# Patient Record
Sex: FEMALE | Race: BLACK OR AFRICAN AMERICAN | NOT HISPANIC OR LATINO | Employment: UNEMPLOYED | URBAN - METROPOLITAN AREA
[De-identification: names, ages, dates, MRNs, and addresses within clinical notes are randomized per-mention and may not be internally consistent; named-entity substitution may affect disease eponyms.]

---

## 2018-03-04 PROBLEM — J30.2 ACUTE SEASONAL ALLERGIC RHINITIS: Status: ACTIVE | Noted: 2018-03-04

## 2018-03-04 PROBLEM — I10 ESSENTIAL HYPERTENSION, BENIGN: Status: ACTIVE | Noted: 2018-03-04

## 2018-04-17 PROBLEM — S93.491A SPRAIN OF POSTERIOR TALOFIBULAR LIGAMENT OF RIGHT ANKLE: Status: ACTIVE | Noted: 2018-04-17

## 2018-05-25 PROBLEM — Z11.3 SCREENING EXAMINATION FOR VENEREAL DISEASE: Status: ACTIVE | Noted: 2018-05-25

## 2018-05-25 PROBLEM — Z00.00 ROUTINE GENERAL MEDICAL EXAMINATION AT A HEALTH CARE FACILITY: Status: ACTIVE | Noted: 2018-05-25

## 2018-05-25 PROBLEM — E55.9 VITAMIN D DEFICIENCY: Status: ACTIVE | Noted: 2018-05-25

## 2018-06-06 PROBLEM — E83.52 HYPERCALCEMIA: Status: ACTIVE | Noted: 2018-06-06

## 2018-06-06 PROBLEM — R73.9 HYPERGLYCEMIA: Status: ACTIVE | Noted: 2018-06-06

## 2018-06-06 PROBLEM — M25.561 ACUTE PAIN OF RIGHT KNEE: Status: ACTIVE | Noted: 2018-06-06

## 2018-08-27 PROBLEM — Z00.00 ROUTINE GENERAL MEDICAL EXAMINATION AT A HEALTH CARE FACILITY: Status: RESOLVED | Noted: 2018-05-25 | Resolved: 2018-08-27

## 2019-06-24 PROBLEM — Z00.00 ANNUAL PHYSICAL EXAM: Status: ACTIVE | Noted: 2018-05-25

## 2019-06-24 PROBLEM — R53.82 CHRONIC FATIGUE: Status: ACTIVE | Noted: 2019-06-24

## 2019-07-08 PROBLEM — M85.89 OSTEOPENIA OF MULTIPLE SITES: Status: ACTIVE | Noted: 2019-07-08

## 2019-09-23 PROBLEM — Z00.00 ANNUAL PHYSICAL EXAM: Status: RESOLVED | Noted: 2018-05-25 | Resolved: 2019-09-23

## 2020-08-21 ENCOUNTER — TELEPHONE (OUTPATIENT)
Dept: ORTHOPEDICS | Facility: CLINIC | Age: 65
End: 2020-08-21

## 2020-08-21 DIAGNOSIS — M25.562 CHRONIC PAIN OF LEFT KNEE: Primary | ICD-10-CM

## 2020-08-21 DIAGNOSIS — G89.29 CHRONIC PAIN OF LEFT KNEE: Primary | ICD-10-CM

## 2020-08-21 NOTE — TELEPHONE ENCOUNTER
Called patient in regards to ortho appt. Informed patient to arrive 30 minutes prior for xrays. Patient verbalized understanding. ms

## 2020-08-27 ENCOUNTER — HOSPITAL ENCOUNTER (OUTPATIENT)
Dept: RADIOLOGY | Facility: HOSPITAL | Age: 65
Discharge: HOME | End: 2020-08-27
Attending: ORTHOPAEDIC SURGERY
Payer: OTHER GOVERNMENT

## 2020-08-27 ENCOUNTER — TELEPHONE (OUTPATIENT)
Dept: ORTHOPEDICS | Facility: CLINIC | Age: 65
End: 2020-08-27

## 2020-08-27 ENCOUNTER — OFFICE VISIT (OUTPATIENT)
Dept: ORTHOPEDICS | Facility: CLINIC | Age: 65
End: 2020-08-27
Payer: OTHER GOVERNMENT

## 2020-08-27 VITALS
HEIGHT: 67 IN | HEART RATE: 97 BPM | BODY MASS INDEX: 42.06 KG/M2 | WEIGHT: 268 LBS | DIASTOLIC BLOOD PRESSURE: 97 MMHG | SYSTOLIC BLOOD PRESSURE: 157 MMHG

## 2020-08-27 DIAGNOSIS — G89.29 CHRONIC PAIN OF LEFT KNEE: ICD-10-CM

## 2020-08-27 DIAGNOSIS — M17.12 ARTHRITIS OF LEFT KNEE: Primary | ICD-10-CM

## 2020-08-27 DIAGNOSIS — M25.562 CHRONIC PAIN OF LEFT KNEE: ICD-10-CM

## 2020-08-27 PROCEDURE — 73562 X-RAY EXAM OF KNEE 3: CPT | Mod: TC,RT,59

## 2020-08-27 PROCEDURE — 73562 XR KNEE ORTHO LEFT WITH FLEXION: ICD-10-PCS | Mod: 26,RT,, | Performed by: RADIOLOGY

## 2020-08-27 PROCEDURE — 73564 X-RAY EXAM KNEE 4 OR MORE: CPT | Mod: 26,LT,, | Performed by: RADIOLOGY

## 2020-08-27 PROCEDURE — 99999 PR PBB SHADOW E&M-EST. PATIENT-LVL III: CPT | Mod: PBBFAC,,, | Performed by: ORTHOPAEDIC SURGERY

## 2020-08-27 PROCEDURE — 99214 PR OFFICE/OUTPT VISIT, EST, LEVL IV, 30-39 MIN: ICD-10-PCS | Mod: S$GLB,,, | Performed by: ORTHOPAEDIC SURGERY

## 2020-08-27 PROCEDURE — 73564 XR KNEE ORTHO LEFT WITH FLEXION: ICD-10-PCS | Mod: 26,LT,, | Performed by: RADIOLOGY

## 2020-08-27 PROCEDURE — 73562 X-RAY EXAM OF KNEE 3: CPT | Mod: 26,RT,, | Performed by: RADIOLOGY

## 2020-08-27 PROCEDURE — 99214 OFFICE O/P EST MOD 30 MIN: CPT | Mod: S$GLB,,, | Performed by: ORTHOPAEDIC SURGERY

## 2020-08-27 PROCEDURE — 99999 PR PBB SHADOW E&M-EST. PATIENT-LVL III: ICD-10-PCS | Mod: PBBFAC,,, | Performed by: ORTHOPAEDIC SURGERY

## 2020-08-27 NOTE — PROGRESS NOTES
"      Patient ID: Madhuri Hickman  YOB: 1955  MRN: [id number]    Chief Complaint: Pain of the Left Knee    Referred By: [facility] at [facility]    History of Present Illness: Madhuri Hickman is a right-hand dominant 64 y.o. female who is not currently working, but is a . Patient is here today for LEFT knee pain. She has a history of bilateral knee pain with previous steroid injections to both knees. Patient is workers comp approved and had an injury to her left knee on 7/21/20 when she was getting out of the truck and she slipped in the grass. Patient said she felt a pop in her knee "like it had came out of place slightly, but I didn't fall." She endorses previous injury to the left knee 15 years ago and the doctor told her she would have arthritis and need a knee replacement in the future. Patient then went to see an urgent care on [facility] for treatment and was prescribed ibuprofen and instructed her to not go back to work until she f/u with an orthopedic doctor. Patient feels symptoms of instability and throbbing pain in her knee now. Pain is localized to the front of her knee.     Knee Pain   The pain is present in the left knee. This is a new problem. The current episode started 1 to 4 weeks ago. There has been a history of trauma. The problem occurs intermittently. The problem has been gradually worsening. The quality of the pain is described as aching, dull and throbbing. The pain is at a severity of 7/10. Associated symptoms include joint locking, joint swelling, a limited range of motion and stiffness. Pertinent negatives include no fever, itching, numbness or tingling. Associated symptoms comments: Feels like it's slipping out of place. The symptoms are aggravated by walking, activity and sitting. She has tried cold and NSAIDs for the symptoms. The treatment provided moderate relief. Physical therapy was not tried.      Past Medical History:   Past Medical " History:   Diagnosis Date    Anxiety     Encounter for general adult medical examination without abnormal findings 05/09/2017    Hypertension     Vitamin D deficiency      Past Surgical History:   Procedure Laterality Date    HYSTERECTOMY      total    VAGINAL DELIVERY       Family History   Problem Relation Age of Onset    Diabetes Mother     Hypertension Mother     Heart disease Father     Hypertension Sister     Heart disease Brother     Diabetes Brother     Cancer Maternal Grandmother         breast     Social History     Socioeconomic History    Marital status:      Spouse name: Not on file    Number of children: Not on file    Years of education: Not on file    Highest education level: Not on file   Occupational History    Not on file   Social Needs    Financial resource strain: Not on file    Food insecurity     Worry: Not on file     Inability: Not on file    Transportation needs     Medical: Not on file     Non-medical: Not on file   Tobacco Use    Smoking status: Never Smoker    Smokeless tobacco: Never Used   Substance and Sexual Activity    Alcohol use: Yes     Comment: social    Drug use: No    Sexual activity: Not Currently   Lifestyle    Physical activity     Days per week: Not on file     Minutes per session: Not on file    Stress: Not on file   Relationships    Social connections     Talks on phone: Not on file     Gets together: Not on file     Attends Yazidi service: Not on file     Active member of club or organization: Not on file     Attends meetings of clubs or organizations: Not on file     Relationship status: Not on file   Other Topics Concern    Not on file   Social History Narrative    Not on file     Medication List with Changes/Refills   Current Medications    AMLODIPINE-BENAZEPRIL 10-20MG (LOTREL) 10-20 MG PER CAPSULE    Take 1 capsule by mouth once daily.    ASPIRIN 81 MG CHEW    Take 81 mg by mouth once daily.    IBUPROFEN (ADVIL,MOTRIN) 800  MG TABLET    Take 800 mg by mouth every 8 (eight) hours as needed.     Review of patient's allergies indicates:  No Known Allergies  Review of Systems   Constitution: Negative for chills and fever.   Skin: Negative for itching.   Musculoskeletal: Positive for joint pain, joint swelling and stiffness.   Neurological: Negative for numbness and tingling.       Physical Exam:   Body mass index is 41.97 kg/m².  Vitals:    08/27/20 0810   BP: (!) 157/97   Pulse: 97      GENERAL: Well appearing, appropriate for stated age, no acute distress.  CARDIOVASCULAR: Pulses regular by peripheral palpation.  PULMONARY: Respirations are even and non-labored.  NEURO: Awake, alert, and oriented x 3.  PSYCH: Mood & affect are appropriate.  HEENT: Head is normocephalic and atraumatic.  Ortho/SPM Exam    Left knee  Range of motion 0-110, with crepitus  No deformity  Tender to palpation most prominent overlying patella, medial joint line  Special tests, lachmans (-) anterior/posterior drawer (-) MCL/LCL strain (-)  Calf soft nontender  5/5 knee flex/ext strength  Grossly silt throughout  Moderate effusion    Right knee  Range of motion 0-120  Nontender to palpation   Special tests, lachmans (-) anterior/posterior drawer (-) MCL/LCL strain (-)  Calf soft nontender  5/5 knee flex/ext strength  Grossly silt throughout  No joint effusion appreciated      Imaging:    X-ray Knee Ortho Left with Flexion  Narrative: EXAMINATION:  XR KNEE ORTHO LEFT WITH FLEXION    CLINICAL HISTORY:  . Pain in left knee    TECHNIQUE:  AP standing of both knees, PA flexion standing views of both knees, and Merchant views of both knees were performed. A lateral of the left knee was also performed.    COMPARISON:  None    FINDINGS:  There is a small to moderate-sized joint effusion present on the left.  No acute fractures or dislocations demonstrated.  There is advanced tricompartmental osteoarthritis on the left with severe joint space loss in the medial compartment.   Moderate tricompartmental degenerative findings noted on the right.  Impression: As above.    Electronically signed by: Alfa Scruggs MD  Date:    08/27/2020  Time:    08:13      Relevant imaging results reviewed and interpreted by me, discussed with the patient and / or family today.     Other Tests:     No other tests performed today.    Assessment:  Madhuri Hickman is a 64 y.o. female with left knee pain following a fall at work on 7/21   Severe osteoarthritis of the left knee,    Encounter Diagnosis   Name Primary?    Arthritis of left knee Yes        Plan:   Treatment options discussed with the patient included conservative management with physical therapy and continued joint injections. Due to the advanced nature of her osteoarthritis in the left knee, these conservative measures may be inadequate for symptom relief.     Patient was referred to Dr. Santana for evaluation for knee replacement, and advised to follow up with us after this appointment if desired.    I had a long discussion with the patient about the causes, treatments, and prognosis for knee arthritis. We discussed that although there is not a cure for arthritis, there are effective ways to improve symptoms. I recommended low impact activities such as elliptical and bicycle. I talked about the fact that aquatic and pool therapy is often helpful. I advised the patient to consider good quality stability and cushioned shoes. We talked about the importance of knee motion in the health of the knee. The patient can also use a compression knee sleeve to help limit swelling and provide proprioceptive feedback. We recommend formal physical therapy or at minimum a home exercise program, which we discussed with the patient. I advised that over the counter solutions such as glucosamine and chondroitin may help with symptoms.   Treatment plan was developed with input from the patient/family, and they expressed understanding and agreement with  the plan. All questions were answered today.    Follow-up: Following knee replacement eval or sooner if there are any problems between now and then.    Disclaimer: This note was prepared using a voice recognition system and is likely to have sound alike errors within the text.

## 2020-08-28 ENCOUNTER — TELEPHONE (OUTPATIENT)
Dept: ORTHOPEDICS | Facility: CLINIC | Age: 65
End: 2020-08-28

## 2020-08-28 NOTE — TELEPHONE ENCOUNTER
Spoke to patient in regards to paper work. Informed her that as soon as the paperwork was completed, we would call her and she could come pick it up. Patient verbalized understanding. ms

## 2020-09-02 ENCOUNTER — TELEPHONE (OUTPATIENT)
Dept: ORTHOPEDICS | Facility: CLINIC | Age: 65
End: 2020-09-02

## 2020-09-02 DIAGNOSIS — M17.12 ARTHRITIS OF LEFT KNEE: Primary | ICD-10-CM

## 2020-09-02 DIAGNOSIS — M25.562 CHRONIC PAIN OF LEFT KNEE: ICD-10-CM

## 2020-09-02 DIAGNOSIS — G89.29 CHRONIC PAIN OF LEFT KNEE: ICD-10-CM

## 2020-09-03 ENCOUNTER — HOSPITAL ENCOUNTER (OUTPATIENT)
Dept: RADIOLOGY | Facility: HOSPITAL | Age: 65
Discharge: HOME | End: 2020-09-03
Attending: ORTHOPAEDIC SURGERY
Payer: OTHER GOVERNMENT

## 2020-09-03 ENCOUNTER — OFFICE VISIT (OUTPATIENT)
Dept: ORTHOPEDICS | Facility: CLINIC | Age: 65
End: 2020-09-03
Payer: OTHER GOVERNMENT

## 2020-09-03 VITALS
WEIGHT: 268 LBS | SYSTOLIC BLOOD PRESSURE: 128 MMHG | BODY MASS INDEX: 42.06 KG/M2 | DIASTOLIC BLOOD PRESSURE: 75 MMHG | HEART RATE: 85 BPM | HEIGHT: 67 IN

## 2020-09-03 DIAGNOSIS — M17.11 PRIMARY OSTEOARTHRITIS OF ONE KNEE, RIGHT: ICD-10-CM

## 2020-09-03 DIAGNOSIS — M17.12 ARTHRITIS OF LEFT KNEE: Primary | ICD-10-CM

## 2020-09-03 PROCEDURE — 99999 PR PBB SHADOW E&M-EST. PATIENT-LVL III: CPT | Mod: PBBFAC,,, | Performed by: PHYSICIAN ASSISTANT

## 2020-09-03 PROCEDURE — 73562 X-RAY EXAM OF KNEE 3: CPT | Mod: 26,LT,, | Performed by: RADIOLOGY

## 2020-09-03 PROCEDURE — 73564 X-RAY EXAM KNEE 4 OR MORE: CPT | Mod: 26,RT,, | Performed by: RADIOLOGY

## 2020-09-03 PROCEDURE — 99213 PR OFFICE/OUTPT VISIT, EST, LEVL III, 20-29 MIN: ICD-10-PCS | Mod: S$GLB,,, | Performed by: PHYSICIAN ASSISTANT

## 2020-09-03 PROCEDURE — 73562 X-RAY EXAM OF KNEE 3: CPT | Mod: TC,LT,59

## 2020-09-03 PROCEDURE — 99999 PR PBB SHADOW E&M-EST. PATIENT-LVL III: ICD-10-PCS | Mod: PBBFAC,,, | Performed by: PHYSICIAN ASSISTANT

## 2020-09-03 PROCEDURE — 99213 OFFICE O/P EST LOW 20 MIN: CPT | Mod: S$GLB,,, | Performed by: PHYSICIAN ASSISTANT

## 2020-09-03 PROCEDURE — 73562 XR KNEE ORTHO RIGHT WITH FLEXION: ICD-10-PCS | Mod: 26,LT,, | Performed by: RADIOLOGY

## 2020-09-03 PROCEDURE — 73564 XR KNEE ORTHO RIGHT WITH FLEXION: ICD-10-PCS | Mod: 26,RT,, | Performed by: RADIOLOGY

## 2020-09-03 NOTE — PROGRESS NOTES
"      Patient ID: Madhuri Hickman  YOB: 1955  MRN: [id number]    Chief Complaint: Right knee pain     Referred By: previous [facility] patient    History of Present Illness: Madhuri Hickman is a right-hand dominant 64 y.o. female who is not currently working, but is a . Patient is here today for a follow up of her RIGHT knee.     Previous (8/27/2020) History of Present Illness: Madhuri Hickman is a right-hand dominant 64 y.o. female who is not currently working, but is a . Patient is here today for LEFT knee pain. She has a history of bilateral knee pain with previous steroid injections to both knees. Patient is workers comp approved and had an injury to her left knee on 7/21/20 when she was getting out of the truck and she slipped in the grass. Patient said she felt a pop in her knee "like it had came out of place slightly, but I didn't fall." She endorses previous injury to the left knee 15 years ago and the doctor told her she would have arthritis and need a knee replacement in the future. Patient then went to see an urgent care on [facility] for treatment and was prescribed ibuprofen and instructed her to not go back to work until she f/u with an orthopedic doctor. Patient feels symptoms of instability and throbbing pain in her knee now. Pain is localized to the front of her knee.     Knee Pain   The pain is present in the right knee. This is a chronic problem. The current episode started more than 1 year ago. The injury was the result of a falling action while at home. The problem occurs intermittently. The problem has been gradually worsening. The quality of the pain is described as aching and numbing. The pain is at a severity of 5/10. Associated symptoms include joint locking, a limited range of motion, numbness and stiffness. Pertinent negatives include no fever. The symptoms are aggravated by activity, walking, lying down, extension and standing. She " has tried injection treatment, cold, OTC ointments and brace/corset for the symptoms. Physical therapy was effective.      Past Medical History:   Past Medical History:   Diagnosis Date    Anxiety     Encounter for general adult medical examination without abnormal findings 05/09/2017    Hypertension     Vitamin D deficiency      Past Surgical History:   Procedure Laterality Date    HYSTERECTOMY      total    VAGINAL DELIVERY       Family History   Problem Relation Age of Onset    Diabetes Mother     Hypertension Mother     Heart disease Father     Hypertension Sister     Heart disease Brother     Diabetes Brother     Cancer Maternal Grandmother         breast     Social History     Socioeconomic History    Marital status:      Spouse name: Not on file    Number of children: Not on file    Years of education: Not on file    Highest education level: Not on file   Occupational History    Not on file   Social Needs    Financial resource strain: Not on file    Food insecurity     Worry: Not on file     Inability: Not on file    Transportation needs     Medical: Not on file     Non-medical: Not on file   Tobacco Use    Smoking status: Never Smoker    Smokeless tobacco: Never Used   Substance and Sexual Activity    Alcohol use: Yes     Comment: social    Drug use: No    Sexual activity: Not Currently   Lifestyle    Physical activity     Days per week: Not on file     Minutes per session: Not on file    Stress: Not on file   Relationships    Social connections     Talks on phone: Not on file     Gets together: Not on file     Attends Mandaen service: Not on file     Active member of club or organization: Not on file     Attends meetings of clubs or organizations: Not on file     Relationship status: Not on file   Other Topics Concern    Not on file   Social History Narrative    Not on file     Medication List with Changes/Refills   Current Medications    AMLODIPINE-BENAZEPRIL  10-20MG (LOTREL) 10-20 MG PER CAPSULE    Take 1 capsule by mouth once daily.    ASPIRIN 81 MG CHEW    Take 81 mg by mouth once daily.    IBUPROFEN (ADVIL,MOTRIN) 800 MG TABLET    Take 800 mg by mouth every 8 (eight) hours as needed.     Review of patient's allergies indicates:  No Known Allergies  Review of Systems   Constitution: Negative for chills and fever.   HENT: Negative for ear discharge and hearing loss.    Eyes: Negative for blurred vision and visual disturbance.   Cardiovascular: Negative for chest pain and leg swelling.   Respiratory: Negative for cough and shortness of breath.    Endocrine: Negative for polyuria.   Hematologic/Lymphatic: Negative for bleeding problem.   Skin: Negative for rash.   Musculoskeletal: Positive for joint pain and stiffness. Negative for back pain, joint swelling, muscle cramps and muscle weakness.   Gastrointestinal: Negative for nausea and vomiting.   Genitourinary: Negative for hematuria.   Neurological: Positive for numbness. Negative for loss of balance and paresthesias.   Psychiatric/Behavioral: Negative for altered mental status.       Physical Exam:   Body mass index is 41.97 kg/m².  Vitals:    09/03/20 0903   BP: 128/75   Pulse: 85      GENERAL: Well appearing, appropriate for stated age, no acute distress.  CARDIOVASCULAR: Pulses regular by peripheral palpation.  PULMONARY: Respirations are even and non-labored.  NEURO: Awake, alert, and oriented x 3.  PSYCH: Mood & affect are appropriate.  HEENT: Head is normocephalic and atraumatic.  General    Nursing note and vitals reviewed.          Right Knee Exam     Tenderness   The patient is tender to palpation of the medial joint line.    Crepitus   The patient has crepitus of the patella.    Range of Motion   Extension: 0   Flexion: 120     Tests   Ligament Examination Lachman: normal (-1 to 2mm) PCL-Posterior Drawer: normal (0 to 2mm)     MCL - Valgus: normal (0 to 2mm)  LCL - Varus: normal    Other   Sensation:  normal    Left Knee Exam     Inspection   Deformity: absent    Tenderness   The patient tender to palpation of the medial joint line and patella.    Crepitus   The patient has crepitus of the patella.    Range of Motion   Extension: 0   Flexion: 110     Tests   Stability Lachman: normal (-1 to 2mm)   MCL - Valgus: abnormal  LCL - Varus: normal (0 to 2mm)    Other   Popliteal (Baker's) Cyst: present  Sensation: normal    Muscle Strength   Right Lower Extremity   Hip Abduction: 5/5   Quadriceps:  5/5   Hamstrin/5   Left Lower Extremity   Hip Abduction: 5/5   Quadriceps:  5/5   Hamstrin/5     Vascular Exam     Right Pulses  Dorsalis Pedis:      2+  Posterior Tibial:      2+        Left Pulses    Posterior Tibial:      2+          Intact EHL, FHL, gastrocsoleus, and tibialis anterior.   Sensation intact to light touch in superficial peroneal, deep peroneal, tibial, sural, and saphenous nerve distributions.   Foot warm and well perfused with capillary refill of less than 2 seconds and palpable pedal pulses.      Imaging:    X-ray Knee Ortho Right with Flexion  Narrative: EXAMINATION:  XR KNEE ORTHO RIGHT WITH FLEXION    CLINICAL HISTORY:  Unilateral primary osteoarthritis, right knee    TECHNIQUE:  AP standing as well as PA flexion standing and Merchant views of both knees were performed.  A lateral view of the right knee is also performed.    COMPARISON:  2020    FINDINGS:  Bilateral degenerative changes.  More pronounced tricompartment degenerative changes on the left with narrowing of medial and lateral compartments near bone on bone articulation.  Prominent degenerative change noted patellofemoral joint on prior exam.  Tricompartment degenerative change on the right with increased narrowing of the lateral and patellofemoral compartments lateral subluxation bilaterally of the tibia at the knee joint noted, greater on the left.  No acute fracture or dislocation..  Focal cortical irregularity with  decreased density midline left tibial plateau abutting the tibial spines.  This most prominent and best visualized on the AP views.  Impression: As above.  Follow-up and or further evaluation as warranted.    Electronically signed by: Bret Tao MD  Date:    09/03/2020  Time:    13:44    Bilateral OA, with left knee bone on bone, right knee with tricompartmental OA  Relevant imaging results reviewed and interpreted by me, discussed with the patient and / or family today.     Other Tests:     No other tests performed today.  At this time we are waiting for the patient to have evaluation with Dr. Combs for left knee TKA. We deferred on CSI today and discussed proceeding with Visco. At this time I will let Dr. Combs order Visco if he would like to proceed with that plan. 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 Injury to the left knee in 2020 patient is following up with Dr. Combs on 9/21.    Right knee OA   Left knee OA    Encounter Diagnoses   Name Primary?    Arthritis of left knee Yes    Primary osteoarthritis of one knee, right         Plan:   Needs Right knee xray today on way out    Deferred on CSI to the right knee   Visco Right knee-give visco handout- recommended proceeding with Dr. Combs   CA-17 filled out   Workers comp restriction remain the same until eval with Dr. Santana   I discussed worrisome and red flag signs and symptoms with the patient. The patient expressed understanding and agreed to alert me immediately or to go to the emergency room if they experience any of these.    Treatment plan was developed with input from the patient/family, and they expressed understanding and agreement with the plan. All questions were answered today.    Follow-up: Dr. Combs or sooner if there are any  problems between now and then.    Disclaimer: This note was prepared using a voice recognition system and is likely to have sound alike errors within the text.

## 2020-09-03 NOTE — PROGRESS NOTES
"      Patient ID: Madhuri Hickman  YOB: 1955  MRN: [id number]    Chief Complaint: No chief complaint on file.    Referred By: previous [facility] patient    History of Present Illness: Madhuri Hickman is a right-hand dominant 64 y.o. female who is not currently working, works for the [employer].  Patient is here today for a follow up of her RIGHT knee- states that she had two serperate injuries on both knees. The left knee she injuried in 7/20 when slipping in the grass. She is scheduled to follow up with Dr. Combs for her left on 9/21/20. She is here today today to discuss and follow up on right knee pain after tripping over a stump and twisting her knee and ankle while at work-2018.  She is here today for right knee and ankle follow up. States that she still has pain to the front of her right knee. Pain with activities hurts with walking, sitting for prolonged, or walking far distances. In the past she was treated with physical therapy and Visco gel injections. Has been using topical cream and ibuprofen. Wears a knee brace occasionally. Denies any fevers, chills, night sweats, numbness and tingling.     Previous (8/27/2020) History of Present Illness: Madhuri Hickman is a right-hand dominant 64 y.o. female who is not currently working, but is a . Patient is here today for LEFT knee pain. She has a history of bilateral knee pain with previous steroid injections to both knees. Patient is workers comp approved and had an injury to her left knee on 7/21/20 when she was getting out of the truck and she slipped in the grass. Patient said she felt a pop in her knee "like it had came out of place slightly, but I didn't fall." She endorses previous injury to the left knee 15 years ago and the doctor told her she would have arthritis and need a knee replacement in the future. Patient then went to see an urgent care on [facility] for treatment and was prescribed ibuprofen and " instructed her to not go back to work until she f/u with an orthopedic doctor. Patient feels symptoms of instability and throbbing pain in her knee now. Pain is localized to the front of her knee.     Knee Pain   The pain is present in the right knee. This is a chronic problem. The current episode started more than 1 year ago. The injury was the result of a falling action while at home. The problem occurs intermittently. The problem has been gradually worsening. The quality of the pain is described as aching and numbing. The pain is at a severity of 5/10. Associated symptoms include joint locking, a limited range of motion, numbness and stiffness. Pertinent negatives include no fever. The symptoms are aggravated by activity, walking, lying down, extension and standing. She has tried injection treatment, cold, OTC ointments and brace/corset for the symptoms. Physical therapy was effective.      Past Medical History:   Past Medical History:   Diagnosis Date    Anxiety     Encounter for general adult medical examination without abnormal findings 05/09/2017    Hypertension     Vitamin D deficiency      Past Surgical History:   Procedure Laterality Date    HYSTERECTOMY      total    VAGINAL DELIVERY       Family History   Problem Relation Age of Onset    Diabetes Mother     Hypertension Mother     Heart disease Father     Hypertension Sister     Heart disease Brother     Diabetes Brother     Cancer Maternal Grandmother         breast     Social History     Socioeconomic History    Marital status:      Spouse name: Not on file    Number of children: Not on file    Years of education: Not on file    Highest education level: Not on file   Occupational History    Not on file   Social Needs    Financial resource strain: Not on file    Food insecurity     Worry: Not on file     Inability: Not on file    Transportation needs     Medical: Not on file     Non-medical: Not on file   Tobacco Use     Smoking status: Never Smoker    Smokeless tobacco: Never Used   Substance and Sexual Activity    Alcohol use: Yes     Comment: social    Drug use: No    Sexual activity: Not Currently   Lifestyle    Physical activity     Days per week: Not on file     Minutes per session: Not on file    Stress: Not on file   Relationships    Social connections     Talks on phone: Not on file     Gets together: Not on file     Attends Jehovah's witness service: Not on file     Active member of club or organization: Not on file     Attends meetings of clubs or organizations: Not on file     Relationship status: Not on file   Other Topics Concern    Not on file   Social History Narrative    Not on file     Medication List with Changes/Refills   Current Medications    AMLODIPINE-BENAZEPRIL 10-20MG (LOTREL) 10-20 MG PER CAPSULE    Take 1 capsule by mouth once daily.    ASPIRIN 81 MG CHEW    Take 81 mg by mouth once daily.    IBUPROFEN (ADVIL,MOTRIN) 800 MG TABLET    Take 800 mg by mouth every 8 (eight) hours as needed.     Review of patient's allergies indicates:  No Known Allergies  Review of Systems   Constitution: Negative for chills and fever.   HENT: Negative for ear discharge and hearing loss.    Eyes: Negative for blurred vision and visual disturbance.   Cardiovascular: Negative for chest pain and leg swelling.   Respiratory: Negative for cough and shortness of breath.    Endocrine: Negative for polyuria.   Hematologic/Lymphatic: Negative for bleeding problem.   Skin: Negative for rash.   Musculoskeletal: Positive for joint pain and stiffness. Negative for back pain, joint swelling, muscle cramps and muscle weakness.   Gastrointestinal: Negative for nausea and vomiting.   Genitourinary: Negative for hematuria.   Neurological: Positive for numbness. Negative for loss of balance and paresthesias.   Psychiatric/Behavioral: Negative for altered mental status.       Physical Exam:   Body mass index is 41.97 kg/m².  There were no  vitals filed for this visit.   GENERAL: Well appearing, appropriate for stated age, no acute distress.  CARDIOVASCULAR: Pulses regular by peripheral palpation.  PULMONARY: Respirations are even and non-labored.  NEURO: Awake, alert, and oriented x 3.  PSYCH: Mood & affect are appropriate.  HEENT: Head is normocephalic and atraumatic.  General    Nursing note and vitals reviewed.          Right Knee Exam     Range of Motion   Extension: 0   Flexion: 120     Tests   Ligament Examination Lachman: normal (-1 to 2mm) PCL-Posterior Drawer: normal (0 to 2mm)     MCL - Valgus: normal (0 to 2mm)  LCL - Varus: normal    Other   Sensation: normal    Left Knee Exam     Tenderness   The patient tender to palpation of the medial joint line.    Range of Motion   Extension: 0   Left knee flexion: 110.     Tests   Stability Lachman: normal (-1 to 2mm) PCL-Posterior Drawer: normal (0 to 2mm)  MCL - Valgus: normal (0 to 2mm)  LCL - Varus: normal (0 to 2mm)    Other   Sensation: normal    Muscle Strength   Right Lower Extremity   Hip Abduction: 5/5   Quadriceps:  5/5   Hamstrin/5   Left Lower Extremity   Hip Abduction: 5/5   Quadriceps:  5/5   Hamstrin/5     Vascular Exam     Right Pulses  Dorsalis Pedis:      2+  Posterior Tibial:      2+        Left Pulses  Dorsalis Pedis:      2+  Posterior Tibial:      2+          ***    Imaging:    X-ray Knee Ortho Left with Flexion  Narrative: EXAMINATION:  XR KNEE ORTHO LEFT WITH FLEXION    CLINICAL HISTORY:  . Pain in left knee    TECHNIQUE:  AP standing of both knees, PA flexion standing views of both knees, and Merchant views of both knees were performed. A lateral of the left knee was also performed.    COMPARISON:  None    FINDINGS:  There is a small to moderate-sized joint effusion present on the left.  No acute fractures or dislocations demonstrated.  There is advanced tricompartmental osteoarthritis on the left with severe joint space loss in the medial compartment.  Moderate  tricompartmental degenerative findings noted on the right.  Impression: As above.    Electronically signed by: Alfa Scruggs MD  Date:    08/27/2020  Time:    08:13    ***  Relevant imaging results reviewed and interpreted by me, discussed with the patient and / or family today. ***    Other Tests:     No other tests performed today.***    Assessment:  Madhuri Hickman is a 64 y.o. female ***   ***    No diagnosis found.     Plan:   ***   Treatment plan was developed with input from the patient/family, and they expressed understanding and agreement with the plan. All questions were answered today.    Follow-up: *** or sooner if there are any problems between now and then.    Disclaimer: This note was prepared using a voice recognition system and is likely to have sound alike errors within the text.

## 2020-09-03 NOTE — PATIENT INSTRUCTIONS
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 Injury to the left knee in 2020 patient is following up with Dr. Combs on 9/21.    Right knee OA   Left knee OA    Encounter Diagnoses   Name Primary?    Arthritis of left knee Yes    Primary osteoarthritis of one knee, right         Plan:   Needs Right knee xray today on way out    Deferred on CSI to the right knee   Visco Right knee-give visco handout- recommended proceeding with Dr. Combs   CA-17 filled out   Workers comp restriction remain the same until eval with Dr. Santana      Follow-up: Dr. Santana or sooner if there are any problems between now and then.    Thank you for choosing [facility] and Dr. Homero Bazan for your orthopedic & sports medicine care. It is our goal to provide you with exceptional care that will help keep you healthy, active, and get you back in the game.    If you felt that you received exemplary care today, please consider leaving us feedback on [employer] at [email or web address].     Please do not hesitate to reach out to us via email, phone, or MyChart with any questions, concerns, or feedback.    If you are experiencing pain/discomfort ,or have questions after 5pm and would like to be connected to the [facility] on-call team, please call this number and specify which Sports Medicine provider is treating you: [phone]

## 2020-09-15 ENCOUNTER — TELEPHONE (OUTPATIENT)
Dept: ORTHOPEDICS | Facility: CLINIC | Age: 65
End: 2020-09-15

## 2020-09-15 NOTE — TELEPHONE ENCOUNTER
----- Message from Jaquelin Ernandez PA-C sent at 9/15/2020  9:38 AM CDT -----  Regarding: FW: CALLING REGARDING A SOONER APPOINTMENT.  Contact: PATIENT  Can you see about this please and check his schedule   ----- Message -----  From: Felecia Kerr  Sent: 9/14/2020   1:18 PM CDT  To: Alf Santana MD  Subject: CALLING REGARDING A SOONER APPOINTMENT.          PLEASE CALL PATIENT @ [phone]. THANKS

## 2020-09-21 ENCOUNTER — OFFICE VISIT (OUTPATIENT)
Dept: ORTHOPEDICS | Facility: CLINIC | Age: 65
End: 2020-09-21
Payer: OTHER GOVERNMENT

## 2020-09-21 VITALS
HEIGHT: 67 IN | DIASTOLIC BLOOD PRESSURE: 95 MMHG | SYSTOLIC BLOOD PRESSURE: 157 MMHG | BODY MASS INDEX: 42.06 KG/M2 | WEIGHT: 268 LBS | HEART RATE: 77 BPM

## 2020-09-21 DIAGNOSIS — M21.162 ACQUIRED VARUS DEFORMITY KNEE, LEFT: ICD-10-CM

## 2020-09-21 DIAGNOSIS — M17.12 ARTHRITIS OF LEFT KNEE: ICD-10-CM

## 2020-09-21 DIAGNOSIS — M17.12 ARTHRITIS OF KNEE, LEFT: Primary | ICD-10-CM

## 2020-09-21 DIAGNOSIS — M17.11 PRIMARY OSTEOARTHRITIS OF ONE KNEE, RIGHT: ICD-10-CM

## 2020-09-21 PROCEDURE — 20610 LARGE JOINT ASPIRATION/INJECTION: L KNEE: ICD-10-PCS | Mod: LT,S$GLB,, | Performed by: ORTHOPAEDIC SURGERY

## 2020-09-21 PROCEDURE — 99999 PR PBB SHADOW E&M-EST. PATIENT-LVL IV: ICD-10-PCS | Mod: PBBFAC,,, | Performed by: ORTHOPAEDIC SURGERY

## 2020-09-21 PROCEDURE — 20610 DRAIN/INJ JOINT/BURSA W/O US: CPT | Mod: LT,S$GLB,, | Performed by: ORTHOPAEDIC SURGERY

## 2020-09-21 PROCEDURE — 99999 PR PBB SHADOW E&M-EST. PATIENT-LVL IV: CPT | Mod: PBBFAC,,, | Performed by: ORTHOPAEDIC SURGERY

## 2020-09-21 PROCEDURE — 99214 PR OFFICE/OUTPT VISIT, EST, LEVL IV, 30-39 MIN: ICD-10-PCS | Mod: 25,S$GLB,, | Performed by: ORTHOPAEDIC SURGERY

## 2020-09-21 PROCEDURE — 99214 OFFICE O/P EST MOD 30 MIN: CPT | Mod: 25,S$GLB,, | Performed by: ORTHOPAEDIC SURGERY

## 2020-09-21 RX ORDER — METHYLPREDNISOLONE ACETATE 80 MG/ML
80 INJECTION, SUSPENSION INTRA-ARTICULAR; INTRALESIONAL; INTRAMUSCULAR; SOFT TISSUE
Status: DISCONTINUED | OUTPATIENT
Start: 2020-09-21 | End: 2020-09-21 | Stop reason: HOSPADM

## 2020-09-21 RX ORDER — IBUPROFEN 800 MG/1
800 TABLET ORAL 3 TIMES DAILY
Qty: 90 TABLET | Refills: 3 | Status: SHIPPED | OUTPATIENT
Start: 2020-09-21 | End: 2021-02-11

## 2020-09-21 RX ADMIN — METHYLPREDNISOLONE ACETATE 80 MG: 80 INJECTION, SUSPENSION INTRA-ARTICULAR; INTRALESIONAL; INTRAMUSCULAR; SOFT TISSUE at 08:09

## 2020-09-21 NOTE — PATIENT INSTRUCTIONS
Left knee posttraumatic arthritis  Recommendations  Injection of the left knee with steroid 80 mg Depo-Medrol mixed with 5 cc 1% lidocaine 09/21/2020  Ice the needed next several days if it hurts or swells up on you  Continue ibuprofen 800 mg not to exceed more than twice a day with food  May take Tylenol 325 mg 2 pills maximum 3 times a day if needed  Must start physical therapy/[facility]  Remain out of work due to difficulty with in stability of the knee and weakness in the leg  Return to clinic in 6 weeks

## 2020-09-21 NOTE — PROCEDURES
Large Joint Aspiration/Injection: L knee    Date/Time: 9/21/2020 8:20 AM  Performed by: Alf Santana MD  Authorized by: Alf Santana MD     Consent Done?:  Yes (Verbal)  Site marked: the procedure site was marked    Timeout: prior to procedure the correct patient, procedure, and site was verified      Local anesthesia used?: Yes    Local anesthetic:  Lidocaine 1% without epinephrine    Details:  Needle Size:  22 G  Ultrasonic Guidance for needle placement?: No    Approach:  Anterolateral  Location:  Knee  Site:  L knee  Medications:  80 mg methylPREDNISolone acetate 80 mg/mL  Patient tolerance:  Patient tolerated the procedure well with no immediate complications

## 2020-09-22 DIAGNOSIS — M17.12 ARTHRITIS OF KNEE, LEFT: Primary | ICD-10-CM

## 2020-09-25 ENCOUNTER — TELEPHONE (OUTPATIENT)
Dept: ORTHOPEDICS | Facility: CLINIC | Age: 65
End: 2020-09-25

## 2020-09-25 NOTE — TELEPHONE ENCOUNTER
Spoke to patient an let her know that we did find her fax for her work status an gave it to Kathryn who she asked for to talk to Dr. Santana on Monday. Patient verbalized understanding         ----- Message from Lula Ramirez sent at 9/25/2020  3:06 PM CDT -----  Contact: [phone]  Would like to consult  with the nurse,  patient  declined , please call back  thanks sj

## 2020-09-29 ENCOUNTER — TELEPHONE (OUTPATIENT)
Dept: ORTHOPEDICS | Facility: CLINIC | Age: 65
End: 2020-09-29

## 2020-09-29 NOTE — TELEPHONE ENCOUNTER
Returned the patient's phone call. Informed the patient that Dr. Santana will be back in the office on Thursday to sign their paperwork. Informed the patient that they can come and  their paperwork on 10/1/20. Patient verbalized understanding.FP        ----- Message from Alissa Tran sent at 9/29/2020 11:20 AM CDT -----  States she would like the nurse (Kathryn) to give her a call regarding a paper she needs to . Please call pt [phone]. Thank you

## 2020-10-02 ENCOUNTER — TELEPHONE (OUTPATIENT)
Dept: ORTHOPEDICS | Facility: CLINIC | Age: 65
End: 2020-10-02

## 2020-10-02 NOTE — TELEPHONE ENCOUNTER
Called the patient in regards to their paperwork. Left a message informing the patient that their paperwork is completed and is ready for .FP

## 2020-10-12 ENCOUNTER — TELEPHONE (OUTPATIENT)
Dept: ORTHOPEDICS | Facility: CLINIC | Age: 65
End: 2020-10-12

## 2020-10-12 NOTE — TELEPHONE ENCOUNTER
Pt called with some concerns of her paperwork . I informed the pt that we will revise her paperwork and call her back as soon as it is completed. Pt was grateful.

## 2020-10-12 NOTE — TELEPHONE ENCOUNTER
Returned the patient's phone call. No answer left a message for the patient to give the office a call back.FP            ----- Message from Alissa Tran sent at 10/12/2020  8:41 AM CDT -----  States she needs someone to give her a call regarding her appt. States she left a message on Friday. Please call pt [phone]. Thank you

## 2020-10-13 ENCOUNTER — TELEPHONE (OUTPATIENT)
Dept: ORTHOPEDICS | Facility: CLINIC | Age: 65
End: 2020-10-13

## 2020-10-13 NOTE — TELEPHONE ENCOUNTER
----- Message from Alissa Tran sent at 10/13/2020  8:06 AM CDT -----  States her paper work was filled out wrong. States she left a message on yesterday. Please call pt [phone]. Thank you

## 2020-10-13 NOTE — TELEPHONE ENCOUNTER
Left message for patient to call back - spoke with slava who stated she talked to patient yesterday and informed her that the paperwork was corrected and is ready to be picked up

## 2020-10-13 NOTE — TELEPHONE ENCOUNTER
Left a message for the patient to give the office a call back.FP      ----- Message from Kathryn Escudero LPN sent at 10/13/2020  2:15 PM CDT -----  Regarding: FW: Workers Comp Form    ----- Message -----  From: Daria Barcenas  Sent: 10/13/2020   1:33 PM CDT  To: [facility] Staff  Subject: Workers Comp Form                                Please return patient's call regarding WC form being filled out for [employer]. Her number is [phone].

## 2020-10-14 ENCOUNTER — TELEPHONE (OUTPATIENT)
Dept: ORTHOPEDICS | Facility: CLINIC | Age: 65
End: 2020-10-14

## 2020-10-14 NOTE — TELEPHONE ENCOUNTER
----- Message from Daria Barcenas sent at 10/14/2020  2:37 PM CDT -----  Regarding: WC Paperwork  Please return patient's call regarding WC form being filled out for [employer]. Her number is [phone].

## 2020-10-15 ENCOUNTER — TELEPHONE (OUTPATIENT)
Dept: ORTHOPEDICS | Facility: CLINIC | Age: 65
End: 2020-10-15

## 2020-10-15 NOTE — TELEPHONE ENCOUNTER
----- Message from Homero Bazan MD sent at 10/15/2020  3:43 PM CDT -----  Contact: Self/[phone]    ----- Message -----  From: Sirena Mar  Sent: 10/14/2020   2:31 PM CDT  To: Homero Bazan MD    Would like to consult with nurse regarding some personal issues, patient states she has been trying to reach out for a week and no respond. Please call back at [phone]. Thanks/ar

## 2020-10-15 NOTE — TELEPHONE ENCOUNTER
Spoke with patient who stated she was actually calling to get in touch with Dr. Santana's staff and was hoping we could intervene. Informed patient that I am sorry she was not able to reach them but that they are in today if she needed to speak with them. Patients states she is going to  her form from them and will call back if she has any issues or concerns.

## 2020-10-28 ENCOUNTER — TELEPHONE (OUTPATIENT)
Dept: ORTHOPEDICS | Facility: CLINIC | Age: 65
End: 2020-10-28

## 2020-10-28 NOTE — TELEPHONE ENCOUNTER
Called patient in regards to paperwork. Patient was unavailable and after answering she disconnected the call. I will make another attempt to reach the patient. ms      ----- Message from Ti Llanes sent at 10/28/2020  9:05 AM CDT -----  Pt would like return call, regarding question about paperwork.  Please call back at [phone].   Md Maddi

## 2020-10-29 ENCOUNTER — TELEPHONE (OUTPATIENT)
Dept: ORTHOPEDICS | Facility: CLINIC | Age: 65
End: 2020-10-29

## 2020-10-29 NOTE — TELEPHONE ENCOUNTER
Returned patient's call. Patient states that  has stopped paying due to the diagnosis of arthritis. Patient states that she was originally seen for a sprain and that  is requesting a narrative of how the sprain has irritated the arthritis.     She is going to drop off the paper of what  is requesting for us to get a better idea of what she needs. Patient stated understanding.       ----- Message from Svitlana Hudson sent at 10/29/2020  8:53 AM CDT -----  Contact: Madhuri Cheema called in regarding speaking to the nurse about her workers comp. Pt stated that her called multiple times on yesterday. Please give her a call back at [phone].    Thanks,  Pb

## 2020-10-30 ENCOUNTER — TELEPHONE (OUTPATIENT)
Dept: ORTHOPEDICS | Facility: CLINIC | Age: 65
End: 2020-10-30

## 2020-10-30 NOTE — TELEPHONE ENCOUNTER
Returned patient's call. Patient states that she dropped off her letter saying what is needed in her letter of explanation. I let patient know that I will pick it up from the appointment desk. Patient stated understanding and was thankful for call back.      ----- Message from Thea Duggan sent at 10/30/2020  1:39 PM CDT -----  Contact: pt  The pt request a return call, no additional info given and can be reached at [phone]///[email or web address]

## 2020-11-03 ENCOUNTER — TELEPHONE (OUTPATIENT)
Dept: ORTHOPEDICS | Facility: CLINIC | Age: 65
End: 2020-11-03

## 2020-11-05 ENCOUNTER — TELEPHONE (OUTPATIENT)
Dept: ORTHOPEDICS | Facility: CLINIC | Age: 65
End: 2020-11-05

## 2020-11-05 NOTE — TELEPHONE ENCOUNTER
Left message for pt to call back     ----- Message from Deisi Mcclellan sent at 11/5/2020 11:37 AM CST -----  Please call pt @ [phone], pt have questions for nurse.

## 2020-11-06 ENCOUNTER — TELEPHONE (OUTPATIENT)
Dept: ORTHOPEDICS | Facility: CLINIC | Age: 65
End: 2020-11-06

## 2020-11-09 ENCOUNTER — OFFICE VISIT (OUTPATIENT)
Dept: ORTHOPEDICS | Facility: CLINIC | Age: 65
End: 2020-11-09
Payer: COMMERCIAL

## 2020-11-09 VITALS
HEIGHT: 67 IN | HEART RATE: 83 BPM | BODY MASS INDEX: 42.06 KG/M2 | DIASTOLIC BLOOD PRESSURE: 105 MMHG | SYSTOLIC BLOOD PRESSURE: 193 MMHG | WEIGHT: 268 LBS

## 2020-11-09 DIAGNOSIS — M17.11 ARTHRITIS OF KNEE, RIGHT: ICD-10-CM

## 2020-11-09 DIAGNOSIS — M21.162 ACQUIRED VARUS DEFORMITY KNEE, LEFT: ICD-10-CM

## 2020-11-09 DIAGNOSIS — M17.12 ARTHRITIS OF KNEE, LEFT: Primary | ICD-10-CM

## 2020-11-09 DIAGNOSIS — M21.061 ACQUIRED VALGUS DEFORMITY KNEE, RIGHT: ICD-10-CM

## 2020-11-09 PROCEDURE — 3080F DIAST BP >= 90 MM HG: CPT | Mod: CPTII,S$GLB,, | Performed by: ORTHOPAEDIC SURGERY

## 2020-11-09 PROCEDURE — 3008F PR BODY MASS INDEX (BMI) DOCUMENTED: ICD-10-PCS | Mod: CPTII,S$GLB,, | Performed by: ORTHOPAEDIC SURGERY

## 2020-11-09 PROCEDURE — 3080F PR MOST RECENT DIASTOLIC BLOOD PRESSURE >= 90 MM HG: ICD-10-PCS | Mod: CPTII,S$GLB,, | Performed by: ORTHOPAEDIC SURGERY

## 2020-11-09 PROCEDURE — 99999 PR PBB SHADOW E&M-EST. PATIENT-LVL III: CPT | Mod: PBBFAC,,, | Performed by: ORTHOPAEDIC SURGERY

## 2020-11-09 PROCEDURE — 3008F BODY MASS INDEX DOCD: CPT | Mod: CPTII,S$GLB,, | Performed by: ORTHOPAEDIC SURGERY

## 2020-11-09 PROCEDURE — 99213 OFFICE O/P EST LOW 20 MIN: CPT | Mod: S$GLB,,, | Performed by: ORTHOPAEDIC SURGERY

## 2020-11-09 PROCEDURE — 99999 PR PBB SHADOW E&M-EST. PATIENT-LVL III: ICD-10-PCS | Mod: PBBFAC,,, | Performed by: ORTHOPAEDIC SURGERY

## 2020-11-09 PROCEDURE — 3077F PR MOST RECENT SYSTOLIC BLOOD PRESSURE >= 140 MM HG: ICD-10-PCS | Mod: CPTII,S$GLB,, | Performed by: ORTHOPAEDIC SURGERY

## 2020-11-09 PROCEDURE — 99213 PR OFFICE/OUTPT VISIT, EST, LEVL III, 20-29 MIN: ICD-10-PCS | Mod: S$GLB,,, | Performed by: ORTHOPAEDIC SURGERY

## 2020-11-09 PROCEDURE — 3077F SYST BP >= 140 MM HG: CPT | Mod: CPTII,S$GLB,, | Performed by: ORTHOPAEDIC SURGERY

## 2020-11-09 NOTE — PATIENT INSTRUCTIONS
The fall sustained approximately 3-4 months ago aggravated preexisting arthritic condition in the left knee  Continue physical therapy/[facility]  Continue ibuprofen 800 mg as needed  May take Tylenol 650 mg 3 times a day maximum if needed for pain  May still cannot go to work  Return to see me in 6-8 weeks for re-evaluation    Please do not forget to take her blood pressure medications today 193/105, pulse 83

## 2020-11-09 NOTE — PROGRESS NOTES
Subjective:     Patient ID: Madhuri Hickman is a 64 y.o. female.    Chief Complaint: Pain of the Left Knee   Mild right knee pain  HPI:  09/21/2020  64-year-old  who injured her knee approximately 16 years ago at work wears a wooden steps broke while going up.  She was eventually return to work with restrictions.  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  She did not hit the ground she held onto the truck.  She stated ibuprofen 800 mg helps but she ran out she really requesting renewal.  She has not received any injections over the last several years.  She has occasional catching locking and giving way.  Pain is 6/10 that is constant worst with stairs if she tries to go up.  Unable to squat or kneel.  Unable to stand or walk too long.  She is not using any assistive devices to ambulate.  Denies any fever or chills or shortness of breath or difficulty with chewing or swallowing loss of bowel bladder control or blurry vision or double vision or numbness or tingling in the legs or hands    11/09/2020  Steroid injection into the left knee as well as ibuprofen seems to have helped given on 09/21.  The story goes that she slipped getting out of her mail truck and slipped and grass.  She does have pre-existing arthritis in her left knee and this fall aggravated it.  She states the physical therapy is helping the injection helped the ibuprofen is helping.  Still afraid L catching locking.  Her pain is 5/10  Denies any fever or chills or shortness of breath or difficulty with chewing or swallowing loss of bowel bladder control.  Past Medical History:   Diagnosis Date    Anxiety     Encounter for general adult medical examination without abnormal findings 05/09/2017    Hypertension     Vitamin D deficiency      Past Surgical History:   Procedure Laterality Date    HYSTERECTOMY      total    VAGINAL DELIVERY       Family History   Problem Relation Age of Onset     Diabetes Mother     Hypertension Mother     Heart disease Father     Hypertension Sister     Heart disease Brother     Diabetes Brother     Cancer Maternal Grandmother         breast     Social History     Socioeconomic History    Marital status:      Spouse name: Not on file    Number of children: Not on file    Years of education: Not on file    Highest education level: Not on file   Occupational History    Not on file   Social Needs    Financial resource strain: Not on file    Food insecurity     Worry: Not on file     Inability: Not on file    Transportation needs     Medical: Not on file     Non-medical: Not on file   Tobacco Use    Smoking status: Never Smoker    Smokeless tobacco: Never Used   Substance and Sexual Activity    Alcohol use: Yes     Comment: social    Drug use: No    Sexual activity: Not Currently   Lifestyle    Physical activity     Days per week: Not on file     Minutes per session: Not on file    Stress: Not on file   Relationships    Social connections     Talks on phone: Not on file     Gets together: Not on file     Attends Hinduism service: Not on file     Active member of club or organization: Not on file     Attends meetings of clubs or organizations: Not on file     Relationship status: Not on file   Other Topics Concern    Not on file   Social History Narrative    Not on file     Medication List with Changes/Refills   Current Medications    AMLODIPINE-BENAZEPRIL 10-20MG (LOTREL) 10-20 MG PER CAPSULE    Take 1 capsule by mouth once daily.    ASPIRIN 81 MG CHEW    Take 81 mg by mouth once daily.    ERGOCALCIFEROL (ERGOCALCIFEROL) 50,000 UNIT CAP    Take 1 capsule (50,000 Units total) by mouth every 7 days.    IBUPROFEN (ADVIL,MOTRIN) 800 MG TABLET    Take 800 mg by mouth every 8 (eight) hours as needed.    IBUPROFEN (ADVIL,MOTRIN) 800 MG TABLET    Take 1 tablet (800 mg total) by mouth 3 (three) times daily.     Review of patient's allergies  indicates:  No Known Allergies  Review of Systems   Constitution: Negative for decreased appetite.   HENT: Negative for tinnitus.    Eyes: Negative for double vision.   Cardiovascular: Negative for chest pain.   Respiratory: Negative for wheezing.    Hematologic/Lymphatic: Negative for bleeding problem.   Skin: Negative for dry skin.   Musculoskeletal: Positive for arthritis, falls, joint pain, joint swelling and stiffness. Negative for back pain, gout and neck pain.   Gastrointestinal: Negative for abdominal pain.   Genitourinary: Negative for bladder incontinence.   Neurological: Negative for numbness, paresthesias and sensory change.   Psychiatric/Behavioral: Negative for altered mental status.       Objective:   Body mass index is 41.97 kg/m².  Vitals:    11/09/20 0813   BP: (!) 193/105   Pulse: 83          General    Constitutional: She is oriented to person, place, and time. She appears well-developed.   HENT:   Head: Atraumatic.   Eyes: EOM are normal.   Cardiovascular: Normal rate.    Pulmonary/Chest: Effort normal.   Neurological: She is alert and oriented to person, place, and time.   Psychiatric: Judgment normal.            Ambulates with a limp  Cervical rotation very functional  Bilateral upper extremity neurovascularly intact.  Radial and ulnar pulses 2+.  Strength is 5/5 throughout motor groups.  Slight weakness in the right deltoid to resistive abduction.  Lumbar with mild discomfort in the lumbar area around L4-5 paraspinal .  Straight leg raising is negative.  Pelvis is level  Bilateral hips passive motion without pain or limitations.  There is no pain to palpation over the trochanters.  Hip flexors, abductors, adductors, quads, hamstrings, ankle extensors and flexors inverters and everters all 5/5  Left knee with varus deformity.  5° of contractures.  Flexes 120°.  Collaterals and cruciate is are stable.  Very mild swelling.  Crepitus to compression on the patella and medially.  Pain is medially  and anteriorly.  She has been tape by at physical therapy.  Mild swelling.  Right knee mild medial joint pain.  No swelling.  Motion 0-130 degrees.  Collaterals and cruciates are stable.  Negative Bienvenido sign  Bilateral calves are soft nontender  Slight swelling around the ankle  DP 1+  PT 1+  Skin is warm to touch no obvious lesions.  Sensory intact to touch    Relevant imaging results reviewed and interpreted by me, discussed with the patient and / or family today     X-ray Knee Ortho Right with Flexion  Narrative: EXAMINATION:  XR KNEE ORTHO RIGHT WITH FLEXION    CLINICAL HISTORY:  Unilateral primary osteoarthritis, right knee    TECHNIQUE:  AP standing as well as PA flexion standing and Merchant views of both knees were performed.  A lateral view of the right knee is also performed.    COMPARISON:  August 27, 2020    FINDINGS:  Bilateral degenerative changes.  More pronounced tricompartment degenerative changes on the left with narrowing of medial and lateral compartments near bone on bone articulation.  Prominent degenerative change noted patellofemoral joint on prior exam.  Tricompartment degenerative change on the right with increased narrowing of the lateral and patellofemoral compartments lateral subluxation bilaterally of the tibia at the knee joint noted, greater on the left.  No acute fracture or dislocation..  Focal cortical irregularity with decreased density midline left tibial plateau abutting the tibial spines.  This most prominent and best visualized on the AP views.  Impression: As above.  Follow-up and or further evaluation as warranted.    Electronically signed by: Bret Tao MD  Date:    09/03/2020  Time:    13:44    Personally reviewed the x-rays of both knees with the patient agree with the above  X-ray 09/03/2020 bilateral knees with left knee severe loss of medial joint space and osteophytic changes anteriorly and as well as laterally.  Also the right knee has some arthritic changes  with not as bad loss of joint space as the left  Assessment:     Encounter Diagnoses   Name Primary?    Arthritis of knee, left Yes    Acquired varus deformity knee, left     Arthritis of knee, right     Acquired valgus deformity knee, right         Plan:   Arthritis of knee, left    Acquired varus deformity knee, left    Arthritis of knee, right    Acquired valgus deformity knee, right         Patient Instructions   The fall sustained approximately 3-4 months ago aggravated preexisting arthritic condition in the left knee  Continue physical therapy/[facility]  Continue ibuprofen 800 mg as needed  May take Tylenol 650 mg 3 times a day maximum if needed for pain  May still cannot go to work  Return to see me in 6-8 weeks for re-evaluation    Please do not forget to take her blood pressure medications today 193/105, pulse 83      In the future she may need to undergo functional capacity evaluation    Disclaimer: This note was prepared using a voice recognition system and is likely to have sound alike errors within the text.

## 2020-11-10 ENCOUNTER — TELEPHONE (OUTPATIENT)
Dept: ORTHOPEDICS | Facility: CLINIC | Age: 65
End: 2020-11-10

## 2020-11-10 NOTE — TELEPHONE ENCOUNTER
Returned patient's call to let her know that Kelly said she could not fill out the narrative due to the sprain not causing the arthritis. Patient spent nearly ten minutes letting me know that she needs a narrative explaining how the sprain aggravated the arthritis. I let patient know that I would pass the message along but there were no promises of a different outcome.     ----- Message from Yogesh Smith sent at 11/10/2020 11:51 AM CST -----  Contact: self  Type:  Patient Returning Call    Who Called:Madhuri Hickman   Who Left Message for Patient:nurse  Does the patient know what this is regarding?:call back  Would the patient rather a call back or a response via [facility]? Call back  Best Call Back Number:[phone]  Additional Information:

## 2020-11-18 ENCOUNTER — TELEPHONE (OUTPATIENT)
Dept: ORTHOPEDICS | Facility: CLINIC | Age: 65
End: 2020-11-18

## 2020-11-18 NOTE — TELEPHONE ENCOUNTER
Returned the patient's phone call. No answer left a message for the patient to give the office a call back.FP           ----- Message from Kathryn Escudero LPN sent at 11/18/2020  9:27 AM CST -----  Contact: self    ----- Message -----  From: Yogesh Smith  Sent: 11/18/2020   8:31 AM CST  To: [facility]    Would like to consult with nurse regarding document to be completed.  Please contact Madhuri Hickman @ [phone].  Thanks/As

## 2020-11-25 ENCOUNTER — TELEPHONE (OUTPATIENT)
Dept: ORTHOPEDICS | Facility: CLINIC | Age: 65
End: 2020-11-25

## 2020-11-25 NOTE — TELEPHONE ENCOUNTER
Attempted to contact pt. No answer. Left voiceMARICHUY martin LPN.    ----- Message from Ximena Mccarthy sent at 11/25/2020  2:35 PM CST -----  Type:  Patient Returning Call    Who Called:Madhuri  Who Left Message for Patient:  Does the patient know what this is regarding?:no  Would the patient rather a call back or a response via [facility]? call  Best Call Back Number:[phone]    Additional Information:

## 2020-11-25 NOTE — TELEPHONE ENCOUNTER
Left a message for the patient to give the office a call back.FP          ----- Message from Kathryn Escudero LPN sent at 11/25/2020 12:34 PM CST -----  Contact: Madhuri    ----- Message -----  From: Deb Kapoor  Sent: 11/25/2020  12:08 PM CST  To: [facility]    Pt called in regards to paperwork that was dropped off last week. Would like to know if it's ready for . Please call back at [phone]. thanks jh

## 2020-11-27 ENCOUNTER — TELEPHONE (OUTPATIENT)
Dept: ORTHOPEDICS | Facility: CLINIC | Age: 65
End: 2020-11-27

## 2020-11-27 NOTE — TELEPHONE ENCOUNTER
Left a message for the patient to give the office a call back.FP        ----- Message from Kia Renner sent at 11/27/2020 10:02 AM CST -----  Contact: Madhuri Cheema is requesting an earlier appointment. Pt stated that she has swelling her knee. Please randy her back at [phone].      Thanks  DD

## 2020-11-27 NOTE — TELEPHONE ENCOUNTER
Left a message for the patient to give the office a call back in regards to their paperwork. FP        ----- Message from Edwin Adams sent at 11/27/2020  1:19 PM CST -----  Contact: RICHARD LUNDY [[id number]]  .Type:  Needs Medical Advice    Who CalledRICHARD LUNDY [[id number]]  Symptoms (please be specific)  How long has patient had these symptoms:  Pharmacy name and phone #:    Would the patient rather a call back or a response via [facility]?  Call   Best Call Back Number: [phone]  Additional Information: Pt is requesting a call back from the nurse in regards to the pt knowing if her paperwork is ready  for  please

## 2020-11-30 ENCOUNTER — TELEPHONE (OUTPATIENT)
Dept: ORTHOPEDICS | Facility: CLINIC | Age: 65
End: 2020-11-30

## 2020-11-30 NOTE — TELEPHONE ENCOUNTER
Called patient back -- she only wanted to speak with arpita - informed patient that she was in a room with a patient and that I would have her return her call - patient verbalized understanding and thankful for call

## 2020-11-30 NOTE — TELEPHONE ENCOUNTER
Informed pt that her paperwork was ready and can be picked up if she would like after Dr. Santana signs it today. Pt states she needs a narrative for WC too, explaining why she cannot work. Informed her that we could send his note, but pt states she needs more than that. She will call us back after finding out exactly what she needs, AL, LPN.     ----- Message from Gustavo Begum sent at 11/30/2020 10:15 AM CST -----  ..Type:  Patient Returning Call    Who Called: pt   Who Left Message for Patient   Does the patient know what this is regarding?: appt   Would the patient rather a call back or a response via [facility]? Call back   Best Call Back Number: [phone]  Additional Information: pt is requesting a call from nurse to discuss an  paper work

## 2020-11-30 NOTE — TELEPHONE ENCOUNTER
----- Message from Lula Ramirez sent at 11/30/2020  1:21 PM CST -----  Contact: [phone]  Would like to consult with the nurse,  patient would like a call back as soon as possible, please call back thanks sj

## 2020-12-01 ENCOUNTER — TELEPHONE (OUTPATIENT)
Dept: ORTHOPEDICS | Facility: CLINIC | Age: 65
End: 2020-12-01

## 2020-12-01 NOTE — TELEPHONE ENCOUNTER
Left a message for the patient to give the office a call back.FP        ----- Message from Kathryn Escudero LPN sent at 11/30/2020  4:25 PM CST -----  Regarding: FW: Call back  Contact: Patient    ----- Message -----  From: Kindra Harrison  Sent: 11/30/2020   4:18 PM CST  To: [facility]  Subject: Call back                                        Patient would like Libertad to give her a call back at Ph .[phone]

## 2020-12-01 NOTE — TELEPHONE ENCOUNTER
Left a message for the patient to give the office a call back.FP          ----- Message from Alissa Tran sent at 12/1/2020  2:28 PM CST -----  Type:  Sooner Apoointment Request    Caller is requesting a sooner appointment.  Caller declined first available appointment listed below.  Caller will not accept being placed on the waitlist and is requesting a message be sent to doctor.  Name of Caller:pt  When is the first available appointment?Feb 2021  Symptoms:LT Knee  Would the patient rather a call back or a response via [facility]? Call back  Best Call Back Number:[phone]  Additional Information: .    Thank you

## 2020-12-02 ENCOUNTER — TELEPHONE (OUTPATIENT)
Dept: ORTHOPEDICS | Facility: CLINIC | Age: 65
End: 2020-12-02

## 2020-12-02 NOTE — TELEPHONE ENCOUNTER
Spoke to patient in regards to office notes for work comp. Patient stated that no one from Dr. Santana's office completed the paperwork. I informed patient that all the workcomp forms and the other forms were completed and scanned in to her chart. Patient verbalized understanding. ms        ----- Message from Kia Renner sent at 12/2/2020  1:44 PM CST -----  Contact: Mary Cheema is requesting a call. Please call her back at [phone].      Thanks  DD

## 2020-12-02 NOTE — TELEPHONE ENCOUNTER
Left a message for the patient to give the office a call back.FP        ----- Message from Yogesh Smith sent at 12/2/2020  9:14 AM CST -----  Contact: self  Would like to consult with nurse regarding a sooner appt.  Pt states if there are any cancellations she would like the appt.  Please contact Madhuri Hickman @ [phone].  Thanks/As

## 2020-12-11 ENCOUNTER — TELEPHONE (OUTPATIENT)
Dept: ORTHOPEDICS | Facility: CLINIC | Age: 65
End: 2020-12-11

## 2020-12-11 NOTE — TELEPHONE ENCOUNTER
Returned the patient's phone call. Informed the patient that I have not gotten any new paperwork for them. Patient states that they have been waiting on a narrative from our workers comp department. Informed the patient that I will send a message to them to give them a call back.Patient verbalized understanding.FP          ----- Message from Yogesh Smith sent at 12/11/2020  1:07 PM CST -----  Contact: self  Would like to consult with nurse regarding paperwork.  Madhuri Hickman would like to know if the forms are completed and ready for .  Please contact Madhuri Hickman @ [phone].  Thanks/As

## 2020-12-14 ENCOUNTER — TELEPHONE (OUTPATIENT)
Dept: ORTHOPEDICS | Facility: CLINIC | Age: 65
End: 2020-12-14

## 2021-01-07 ENCOUNTER — OFFICE VISIT (OUTPATIENT)
Dept: ORTHOPEDICS | Facility: CLINIC | Age: 66
End: 2021-01-07
Payer: OTHER GOVERNMENT

## 2021-01-07 VITALS
SYSTOLIC BLOOD PRESSURE: 173 MMHG | WEIGHT: 268 LBS | BODY MASS INDEX: 42.06 KG/M2 | DIASTOLIC BLOOD PRESSURE: 100 MMHG | HEART RATE: 99 BPM | HEIGHT: 67 IN

## 2021-01-07 DIAGNOSIS — M21.061 ACQUIRED VALGUS DEFORMITY KNEE, RIGHT: ICD-10-CM

## 2021-01-07 DIAGNOSIS — M21.162 ACQUIRED VARUS DEFORMITY KNEE, LEFT: ICD-10-CM

## 2021-01-07 DIAGNOSIS — M17.12 ARTHRITIS OF KNEE, LEFT: Primary | ICD-10-CM

## 2021-01-07 DIAGNOSIS — M17.11 ARTHRITIS OF KNEE, RIGHT: ICD-10-CM

## 2021-01-07 DIAGNOSIS — M17.32 POST-TRAUMATIC OSTEOARTHRITIS OF LEFT KNEE: ICD-10-CM

## 2021-01-07 PROCEDURE — 99999 PR PBB SHADOW E&M-EST. PATIENT-LVL III: ICD-10-PCS | Mod: PBBFAC,,, | Performed by: ORTHOPAEDIC SURGERY

## 2021-01-07 PROCEDURE — 99213 OFFICE O/P EST LOW 20 MIN: CPT | Mod: S$GLB,,, | Performed by: ORTHOPAEDIC SURGERY

## 2021-01-07 PROCEDURE — 99999 PR PBB SHADOW E&M-EST. PATIENT-LVL III: CPT | Mod: PBBFAC,,, | Performed by: ORTHOPAEDIC SURGERY

## 2021-01-07 PROCEDURE — 99213 PR OFFICE/OUTPT VISIT, EST, LEVL III, 20-29 MIN: ICD-10-PCS | Mod: S$GLB,,, | Performed by: ORTHOPAEDIC SURGERY

## 2021-02-26 ENCOUNTER — TELEPHONE (OUTPATIENT)
Dept: ORTHOPEDICS | Facility: CLINIC | Age: 66
End: 2021-02-26

## 2021-02-26 ENCOUNTER — OFFICE VISIT (OUTPATIENT)
Dept: ORTHOPEDICS | Facility: CLINIC | Age: 66
End: 2021-02-26
Payer: COMMERCIAL

## 2021-02-26 VITALS
WEIGHT: 268 LBS | HEART RATE: 91 BPM | SYSTOLIC BLOOD PRESSURE: 171 MMHG | BODY MASS INDEX: 42.06 KG/M2 | HEIGHT: 67 IN | DIASTOLIC BLOOD PRESSURE: 86 MMHG

## 2021-02-26 DIAGNOSIS — M17.12 ARTHRITIS OF KNEE, LEFT: Primary | ICD-10-CM

## 2021-02-26 DIAGNOSIS — M21.061 ACQUIRED VALGUS DEFORMITY KNEE, RIGHT: ICD-10-CM

## 2021-02-26 DIAGNOSIS — M17.32 POST-TRAUMATIC OSTEOARTHRITIS OF LEFT KNEE: ICD-10-CM

## 2021-02-26 DIAGNOSIS — M21.162 ACQUIRED VARUS DEFORMITY KNEE, LEFT: ICD-10-CM

## 2021-02-26 DIAGNOSIS — Z01.818 PREOP TESTING: ICD-10-CM

## 2021-02-26 DIAGNOSIS — M17.11 ARTHRITIS OF KNEE, RIGHT: ICD-10-CM

## 2021-02-26 PROCEDURE — 3079F DIAST BP 80-89 MM HG: CPT | Mod: CPTII,S$GLB,, | Performed by: ORTHOPAEDIC SURGERY

## 2021-02-26 PROCEDURE — 3008F BODY MASS INDEX DOCD: CPT | Mod: CPTII,S$GLB,, | Performed by: ORTHOPAEDIC SURGERY

## 2021-02-26 PROCEDURE — 3008F PR BODY MASS INDEX (BMI) DOCUMENTED: ICD-10-PCS | Mod: CPTII,S$GLB,, | Performed by: ORTHOPAEDIC SURGERY

## 2021-02-26 PROCEDURE — 3077F PR MOST RECENT SYSTOLIC BLOOD PRESSURE >= 140 MM HG: ICD-10-PCS | Mod: CPTII,S$GLB,, | Performed by: ORTHOPAEDIC SURGERY

## 2021-02-26 PROCEDURE — 99999 PR PBB SHADOW E&M-EST. PATIENT-LVL III: CPT | Mod: PBBFAC,,, | Performed by: ORTHOPAEDIC SURGERY

## 2021-02-26 PROCEDURE — 1125F PR PAIN SEVERITY QUANTIFIED, PAIN PRESENT: ICD-10-PCS | Mod: S$GLB,,, | Performed by: ORTHOPAEDIC SURGERY

## 2021-02-26 PROCEDURE — 3077F SYST BP >= 140 MM HG: CPT | Mod: CPTII,S$GLB,, | Performed by: ORTHOPAEDIC SURGERY

## 2021-02-26 PROCEDURE — 3079F PR MOST RECENT DIASTOLIC BLOOD PRESSURE 80-89 MM HG: ICD-10-PCS | Mod: CPTII,S$GLB,, | Performed by: ORTHOPAEDIC SURGERY

## 2021-02-26 PROCEDURE — 99214 OFFICE O/P EST MOD 30 MIN: CPT | Mod: S$GLB,,, | Performed by: ORTHOPAEDIC SURGERY

## 2021-02-26 PROCEDURE — 99999 PR PBB SHADOW E&M-EST. PATIENT-LVL III: ICD-10-PCS | Mod: PBBFAC,,, | Performed by: ORTHOPAEDIC SURGERY

## 2021-02-26 PROCEDURE — 1125F AMNT PAIN NOTED PAIN PRSNT: CPT | Mod: S$GLB,,, | Performed by: ORTHOPAEDIC SURGERY

## 2021-02-26 PROCEDURE — 99214 PR OFFICE/OUTPT VISIT, EST, LEVL IV, 30-39 MIN: ICD-10-PCS | Mod: S$GLB,,, | Performed by: ORTHOPAEDIC SURGERY

## 2021-02-26 RX ORDER — GABAPENTIN 300 MG/1
300 CAPSULE ORAL NIGHTLY
Qty: 30 CAPSULE | Refills: 3 | Status: SHIPPED | OUTPATIENT
Start: 2021-02-26 | End: 2021-09-07

## 2021-02-26 RX ORDER — MELOXICAM 15 MG/1
15 TABLET ORAL DAILY
Qty: 30 TABLET | Refills: 3 | Status: SHIPPED | OUTPATIENT
Start: 2021-02-26 | End: 2021-09-07

## 2021-03-12 ENCOUNTER — TELEPHONE (OUTPATIENT)
Dept: ORTHOPEDICS | Facility: CLINIC | Age: 66
End: 2021-03-12

## 2021-03-15 PROCEDURE — 93010 ELECTROCARDIOGRAM REPORT: CPT | Mod: ,,, | Performed by: INTERNAL MEDICINE

## 2021-03-15 PROCEDURE — 93010 EKG 12-LEAD: ICD-10-PCS | Mod: ,,, | Performed by: INTERNAL MEDICINE

## 2021-03-16 ENCOUNTER — HOSPITAL ENCOUNTER (OUTPATIENT)
Dept: CARDIOLOGY | Facility: HOSPITAL | Age: 66
Discharge: HOME | End: 2021-03-16
Attending: ORTHOPAEDIC SURGERY
Payer: OTHER GOVERNMENT

## 2021-03-16 ENCOUNTER — TELEPHONE (OUTPATIENT)
Dept: ORTHOPEDICS | Facility: CLINIC | Age: 66
End: 2021-03-16

## 2021-03-16 ENCOUNTER — HOSPITAL ENCOUNTER (OUTPATIENT)
Dept: RADIOLOGY | Facility: HOSPITAL | Age: 66
Discharge: HOME | End: 2021-03-16
Attending: ORTHOPAEDIC SURGERY
Payer: OTHER GOVERNMENT

## 2021-03-16 DIAGNOSIS — Z01.818 PREOP TESTING: ICD-10-CM

## 2021-03-16 PROCEDURE — 71046 X-RAY EXAM CHEST 2 VIEWS: CPT | Mod: TC

## 2021-03-16 PROCEDURE — 71046 X-RAY EXAM CHEST 2 VIEWS: CPT | Mod: 26,,, | Performed by: RADIOLOGY

## 2021-03-16 PROCEDURE — 93005 ELECTROCARDIOGRAM TRACING: CPT

## 2021-03-16 PROCEDURE — 71046 XR CHEST PA AND LATERAL PRE-OP: ICD-10-PCS | Mod: 26,,, | Performed by: RADIOLOGY

## 2021-03-17 RX ORDER — CIPROFLOXACIN 250 MG/1
250 TABLET, FILM COATED ORAL EVERY 12 HOURS
Qty: 20 TABLET | Refills: 0 | Status: SHIPPED | OUTPATIENT
Start: 2021-03-17 | End: 2021-09-07

## 2021-03-19 ENCOUNTER — TELEPHONE (OUTPATIENT)
Dept: ORTHOPEDICS | Facility: CLINIC | Age: 66
End: 2021-03-19

## 2021-03-26 ENCOUNTER — TELEPHONE (OUTPATIENT)
Dept: ORTHOPEDICS | Facility: CLINIC | Age: 66
End: 2021-03-26

## 2021-03-29 ENCOUNTER — TELEPHONE (OUTPATIENT)
Dept: ORTHOPEDICS | Facility: CLINIC | Age: 66
End: 2021-03-29

## 2021-03-31 ENCOUNTER — TELEPHONE (OUTPATIENT)
Dept: ORTHOPEDICS | Facility: CLINIC | Age: 66
End: 2021-03-31

## 2021-04-27 ENCOUNTER — TELEPHONE (OUTPATIENT)
Dept: ORTHOPEDICS | Facility: CLINIC | Age: 66
End: 2021-04-27

## 2021-04-29 ENCOUNTER — TELEPHONE (OUTPATIENT)
Dept: ORTHOPEDICS | Facility: CLINIC | Age: 66
End: 2021-04-29

## 2021-05-20 ENCOUNTER — OFFICE VISIT (OUTPATIENT)
Dept: ORTHOPEDICS | Facility: CLINIC | Age: 66
End: 2021-05-20
Payer: OTHER GOVERNMENT

## 2021-05-20 VITALS
HEART RATE: 100 BPM | BODY MASS INDEX: 42.06 KG/M2 | HEIGHT: 67 IN | WEIGHT: 268 LBS | DIASTOLIC BLOOD PRESSURE: 84 MMHG | SYSTOLIC BLOOD PRESSURE: 153 MMHG

## 2021-05-20 DIAGNOSIS — M21.061 ACQUIRED VALGUS DEFORMITY KNEE, RIGHT: ICD-10-CM

## 2021-05-20 DIAGNOSIS — M17.11 ARTHRITIS OF KNEE, RIGHT: ICD-10-CM

## 2021-05-20 DIAGNOSIS — M17.32 POST-TRAUMATIC OSTEOARTHRITIS OF LEFT KNEE: Primary | ICD-10-CM

## 2021-05-20 DIAGNOSIS — M17.12 ARTHRITIS OF LEFT KNEE: ICD-10-CM

## 2021-05-20 PROCEDURE — 99999 PR PBB SHADOW E&M-EST. PATIENT-LVL III: ICD-10-PCS | Mod: PBBFAC,,, | Performed by: ORTHOPAEDIC SURGERY

## 2021-05-20 PROCEDURE — 99213 OFFICE O/P EST LOW 20 MIN: CPT | Mod: S$GLB,,, | Performed by: ORTHOPAEDIC SURGERY

## 2021-05-20 PROCEDURE — 99213 PR OFFICE/OUTPT VISIT, EST, LEVL III, 20-29 MIN: ICD-10-PCS | Mod: S$GLB,,, | Performed by: ORTHOPAEDIC SURGERY

## 2021-05-20 PROCEDURE — 99999 PR PBB SHADOW E&M-EST. PATIENT-LVL III: CPT | Mod: PBBFAC,,, | Performed by: ORTHOPAEDIC SURGERY

## 2021-05-20 RX ORDER — MELOXICAM 15 MG/1
15 TABLET ORAL DAILY
Qty: 90 TABLET | Refills: 1 | Status: SHIPPED | OUTPATIENT
Start: 2021-05-20 | End: 2022-07-25

## 2021-05-20 RX ORDER — METFORMIN HYDROCHLORIDE 500 MG/1
500 TABLET, EXTENDED RELEASE ORAL
COMMUNITY
Start: 2021-04-22 | End: 2021-09-07 | Stop reason: SDUPTHER

## 2021-05-20 RX ORDER — CHLORTHALIDONE 25 MG/1
25 TABLET ORAL DAILY
COMMUNITY
Start: 2021-05-03 | End: 2021-12-10 | Stop reason: SDUPTHER

## 2021-05-27 ENCOUNTER — TELEPHONE (OUTPATIENT)
Dept: ORTHOPEDICS | Facility: CLINIC | Age: 66
End: 2021-05-27

## 2021-06-02 DIAGNOSIS — M17.11 ARTHRITIS OF KNEE, RIGHT: ICD-10-CM

## 2021-06-02 DIAGNOSIS — M17.32 POST-TRAUMATIC OSTEOARTHRITIS OF LEFT KNEE: Primary | ICD-10-CM

## 2021-07-09 ENCOUNTER — TELEPHONE (OUTPATIENT)
Dept: RADIOLOGY | Facility: HOSPITAL | Age: 66
End: 2021-07-09

## 2021-07-12 ENCOUNTER — HOSPITAL ENCOUNTER (OUTPATIENT)
Dept: RADIOLOGY | Facility: HOSPITAL | Age: 66
Discharge: HOME | End: 2021-07-12
Attending: ORTHOPAEDIC SURGERY
Payer: OTHER GOVERNMENT

## 2021-07-12 DIAGNOSIS — M17.11 ARTHRITIS OF KNEE, RIGHT: ICD-10-CM

## 2021-07-12 DIAGNOSIS — M17.32 POST-TRAUMATIC OSTEOARTHRITIS OF LEFT KNEE: ICD-10-CM

## 2021-07-12 PROCEDURE — 73721 MRI KNEE WITHOUT CONTRAST LEFT: ICD-10-PCS | Mod: 26,LT,, | Performed by: RADIOLOGY

## 2021-07-12 PROCEDURE — 73721 MRI JNT OF LWR EXTRE W/O DYE: CPT | Mod: TC,LT

## 2021-07-12 PROCEDURE — 73721 MRI JNT OF LWR EXTRE W/O DYE: CPT | Mod: 26,LT,, | Performed by: RADIOLOGY

## 2021-07-26 ENCOUNTER — OFFICE VISIT (OUTPATIENT)
Dept: ORTHOPEDICS | Facility: CLINIC | Age: 66
End: 2021-07-26
Payer: OTHER GOVERNMENT

## 2021-07-26 VITALS
SYSTOLIC BLOOD PRESSURE: 149 MMHG | WEIGHT: 268 LBS | HEART RATE: 86 BPM | BODY MASS INDEX: 42.06 KG/M2 | DIASTOLIC BLOOD PRESSURE: 89 MMHG | HEIGHT: 67 IN

## 2021-07-26 DIAGNOSIS — M17.11 ARTHRITIS OF KNEE, RIGHT: ICD-10-CM

## 2021-07-26 DIAGNOSIS — M17.32 POST-TRAUMATIC OSTEOARTHRITIS OF LEFT KNEE: Primary | ICD-10-CM

## 2021-07-26 DIAGNOSIS — M21.061 ACQUIRED VALGUS DEFORMITY KNEE, RIGHT: ICD-10-CM

## 2021-07-26 PROCEDURE — 99999 PR PBB SHADOW E&M-EST. PATIENT-LVL III: CPT | Mod: PBBFAC,,, | Performed by: ORTHOPAEDIC SURGERY

## 2021-07-26 PROCEDURE — 99214 OFFICE O/P EST MOD 30 MIN: CPT | Mod: S$GLB,,, | Performed by: ORTHOPAEDIC SURGERY

## 2021-07-26 PROCEDURE — 99999 PR PBB SHADOW E&M-EST. PATIENT-LVL III: ICD-10-PCS | Mod: PBBFAC,,, | Performed by: ORTHOPAEDIC SURGERY

## 2021-07-26 PROCEDURE — 99214 PR OFFICE/OUTPT VISIT, EST, LEVL IV, 30-39 MIN: ICD-10-PCS | Mod: S$GLB,,, | Performed by: ORTHOPAEDIC SURGERY

## 2021-07-30 ENCOUNTER — TELEPHONE (OUTPATIENT)
Dept: ORTHOPEDICS | Facility: CLINIC | Age: 66
End: 2021-07-30

## 2021-10-12 ENCOUNTER — TELEPHONE (OUTPATIENT)
Dept: ORTHOPEDICS | Facility: CLINIC | Age: 66
End: 2021-10-12

## 2021-12-10 DIAGNOSIS — M17.32 POST-TRAUMATIC OSTEOARTHRITIS OF LEFT KNEE: Primary | ICD-10-CM

## 2021-12-10 DIAGNOSIS — Z01.818 PREOP TESTING: ICD-10-CM

## 2021-12-13 ENCOUNTER — OFFICE VISIT (OUTPATIENT)
Dept: CARDIOLOGY | Facility: CLINIC | Age: 66
End: 2021-12-13
Payer: OTHER GOVERNMENT

## 2021-12-13 ENCOUNTER — HOSPITAL ENCOUNTER (OUTPATIENT)
Dept: CARDIOLOGY | Facility: HOSPITAL | Age: 66
Discharge: HOME | End: 2021-12-13
Attending: ORTHOPAEDIC SURGERY
Payer: OTHER GOVERNMENT

## 2021-12-13 VITALS
HEIGHT: 72 IN | WEIGHT: 273.13 LBS | OXYGEN SATURATION: 99 % | BODY MASS INDEX: 36.99 KG/M2 | HEART RATE: 108 BPM | SYSTOLIC BLOOD PRESSURE: 142 MMHG | DIASTOLIC BLOOD PRESSURE: 78 MMHG

## 2021-12-13 DIAGNOSIS — Z01.818 PREOP TESTING: ICD-10-CM

## 2021-12-13 DIAGNOSIS — I10 ESSENTIAL HYPERTENSION, BENIGN: Primary | ICD-10-CM

## 2021-12-13 DIAGNOSIS — R53.82 CHRONIC FATIGUE: ICD-10-CM

## 2021-12-13 PROBLEM — Z01.810 PREOP CARDIOVASCULAR EXAM: Status: ACTIVE | Noted: 2018-05-25

## 2021-12-13 PROCEDURE — 93005 ELECTROCARDIOGRAM TRACING: CPT

## 2021-12-13 PROCEDURE — 93010 ELECTROCARDIOGRAM REPORT: CPT | Mod: ,,, | Performed by: INTERNAL MEDICINE

## 2021-12-13 PROCEDURE — 99999 PR PBB SHADOW E&M-EST. PATIENT-LVL III: CPT | Mod: PBBFAC,,, | Performed by: INTERNAL MEDICINE

## 2021-12-13 PROCEDURE — 99999 PR PBB SHADOW E&M-EST. PATIENT-LVL III: ICD-10-PCS | Mod: PBBFAC,,, | Performed by: INTERNAL MEDICINE

## 2021-12-13 PROCEDURE — 99205 PR OFFICE/OUTPT VISIT, NEW, LEVL V, 60-74 MIN: ICD-10-PCS | Mod: S$GLB,,, | Performed by: INTERNAL MEDICINE

## 2021-12-13 PROCEDURE — 93010 EKG 12-LEAD: ICD-10-PCS | Mod: ,,, | Performed by: INTERNAL MEDICINE

## 2021-12-13 PROCEDURE — 99205 OFFICE O/P NEW HI 60 MIN: CPT | Mod: S$GLB,,, | Performed by: INTERNAL MEDICINE

## 2021-12-20 NOTE — PRE ADMISSION SCREENING
Pre op instructions reviewed with patient per phone:    To confirm, Your surgeon has instructed you:  Surgery is scheduled 12/29/21at 0945am.      [facility] main lobby by 0815am.   Pre admit office to call afternoon prior to surgery with final arrival time      INSTRUCTIONS IMPORTANT!!!  LAB APPOINTMENT: 12/28 at [facility], @ 1020am. DO NOT REMOVE RED ARM BAND PRIOR TO SURGERY.    NIGHT PRIOR TO SURGERY: TAKE TYLENOL 650MG WITH EVENING MEAL.    ¨ Do not eat, drink, or smoke after 12 midnight prior to surgery, including water. OK to brush teeth, no gum, candy or mints!    ¨ Take only these medicines with a small swallow of water-morning of surgery.  None    TAKE YOUR LAST DOSE OF METFORMIN, PRIOR TO SURGERY, Monday 12/27/21.    -------   Do not shave legs/underarms 3 days prior to surgery  ____   1 visitor is  allowed in the pre operative area, no one under the age of 16,and must adhere to social distancing guidelines.  1 visitor/family member is allowed to              visit non covid  in-patients in their room    ____   Family/caregivers will be updated re pt status via text/cell phone    ____  Do not wear makeup, including mascara.  ____  No powder, lotions or creams to surgical area.  ____  Please remove all jewelry, including piercings and leave at home.  ____  No money or valuables needed. Please leave at home.  ____  Please bring identification and insurance information to hospital.  ____  If going home the same day, arrange for a ride home. You will not be able to   drive if Anesthesia was used.  ____  Children, under 12 years old, must remain in the waiting room with an adult.  They are not allowed in patient areas.  ____  Wear loose fitting clothing. Allow for dressings, bandages.  ____  Stop Aspirin, Ibuprofen, Motrin and Aleve at least 5-7 days before surgery, unless otherwise instructed by your doctor, or the nurse.   You MAY  use Tylenol/acetaminophen until day of surgery.  ____  If you take diabetic medication, do not take am of surgery unless instructed by   Doctor.  ____ Stop taking any Fish Oil supplement or any Vitamins that contain Vitamin E at least 5 days prior to surgery.          Bathing Instructions-- The night before surgery and the morning prior to coming to the hospital:   -Do not shave the surgical area.   -Shower and wash your hair and body as usual with anti-bacterial  soap and shampoo.   -Rinse your hair and body completely.   -Use one packet of hibiclens to wash the surgical site (using your hand) gently for 5 minutes.  Do not scrub you skin too hard.   -Do not use hibiclens on your head, face, or genitals.   -Do not wash with anti-bacterial soap after you use the hibiclens.   -Rinse your body thoroughly.   -Dry with clean, soft towel.  Do not use lotion, cream, deodorant, or powders on   the surgical site.    Use antibacterial soap in place of hibiclens if your surgery is on the head, face or genitals.         Surgical Site Infection    Prevention of surgical site infections:     -Keep incisions clean and dry.   -Do not soak/submerge incisions in water until completely healed.   -Do not apply lotions, powders, creams, or deodorants to site.   -Always make sure hands are cleaned with antibacterial soap/ alcohol-based   prior to touching the surgical site.  (This includes doctors, nurses, staff, and yourself.)    Signs and symptoms:   -Redness and pain around the area where you had surgery   -Drainage of cloudy fluid from your surgical wound   -Fever over 100.4  I have read or had read and explained to me, and understand the above information.

## 2021-12-23 DIAGNOSIS — M17.11 ARTHRITIS OF KNEE, RIGHT: ICD-10-CM

## 2021-12-23 DIAGNOSIS — M17.12 ARTHRITIS OF KNEE, LEFT: Primary | ICD-10-CM

## 2021-12-28 ENCOUNTER — TELEPHONE (OUTPATIENT)
Dept: ORTHOPEDICS | Facility: CLINIC | Age: 66
End: 2021-12-28
Payer: OTHER GOVERNMENT

## 2021-12-28 ENCOUNTER — LAB VISIT (OUTPATIENT)
Dept: LAB | Facility: HOSPITAL | Age: 66
End: 2021-12-28
Attending: ORTHOPAEDIC SURGERY
Payer: OTHER GOVERNMENT

## 2021-12-28 DIAGNOSIS — Z01.818 PREOP TESTING: ICD-10-CM

## 2021-12-28 LAB
ABO + RH BLD: NORMAL
BLD GP AB SCN CELLS X3 SERPL QL: NORMAL

## 2021-12-28 PROCEDURE — 36415 COLL VENOUS BLD VENIPUNCTURE: CPT | Performed by: ORTHOPAEDIC SURGERY

## 2021-12-28 PROCEDURE — 86850 RBC ANTIBODY SCREEN: CPT | Performed by: ORTHOPAEDIC SURGERY

## 2021-12-28 RX ORDER — GABAPENTIN 300 MG/1
300 CAPSULE ORAL NIGHTLY
Qty: 30 CAPSULE | Refills: 11 | Status: SHIPPED | OUTPATIENT
Start: 2021-12-28 | End: 2022-12-28

## 2021-12-28 RX ORDER — OXYCODONE AND ACETAMINOPHEN 10; 325 MG/1; MG/1
1 TABLET ORAL EVERY 6 HOURS PRN
Qty: 28 TABLET | Refills: 0 | Status: SHIPPED | OUTPATIENT
Start: 2021-12-28 | End: 2022-01-18

## 2021-12-28 RX ORDER — ONDANSETRON 4 MG/1
8 TABLET, ORALLY DISINTEGRATING ORAL EVERY 8 HOURS PRN
Qty: 20 TABLET | Refills: 0 | Status: SHIPPED | OUTPATIENT
Start: 2021-12-28

## 2021-12-28 NOTE — OP NOTE
[facility] - Surgery ([facility])  Orthopedic Surgery  Operative Note    SUMMARY     Date of Procedure: 12/29/2021     Procedur    Surgeon(s) and Role:     * Alf Santana MD - Primary    Assisting Surgeon: None    Pre-Operative Diagnosis:    Post-Operative Diagnosis:  Same as preop  Anesthesia: General    Significant Surgical Tasks Conducted by the Assistant(s), if Applicable:  No assistant/OR tech    Complications: none     Estimated Blood Loss (EBL):             Implants:   Specimens:            Condition: Good    Disposition:   Attestation: I performed the procedure.    Description

## 2021-12-28 NOTE — DISCHARGE SUMMARY
[facility] - Surgery (Hospital)  Discharge Note  Short Stay    Procedure right total knee revision with soft tissue balancing with poly exchange.  Open lateral release outside in with partial lateral facetectomy  OUTCOME: Patient tolerated treatment/procedure well without complication and is now ready for discharge.    DISPOSITION: Home-Health Care [facility] [place]    FINAL DIAGNOSIS:  <principal problem not specified>  Failed right total knee revision/ligamentous instability, patella maltracking  FOLLOWUP: In clinic    DISCHARGE INSTRUCTIONS:  No discharge procedures on file.     TIME SPENT ON DISCHARGE:  25 minutes

## 2021-12-29 ENCOUNTER — HOSPITAL ENCOUNTER (OUTPATIENT)
Facility: HOSPITAL | Age: 66
Discharge: HOME HEALTH | End: 2021-12-29
Attending: ORTHOPAEDIC SURGERY | Admitting: ORTHOPAEDIC SURGERY
Payer: OTHER GOVERNMENT

## 2021-12-29 ENCOUNTER — ANESTHESIA EVENT (OUTPATIENT)
Dept: SURGERY | Facility: HOSPITAL | Age: 66
End: 2021-12-29
Payer: OTHER GOVERNMENT

## 2021-12-29 ENCOUNTER — ANESTHESIA (OUTPATIENT)
Dept: SURGERY | Facility: HOSPITAL | Age: 66
End: 2021-12-29
Payer: OTHER GOVERNMENT

## 2021-12-29 DIAGNOSIS — T84.012D FAILED TOTAL RIGHT KNEE REPLACEMENT, SUBSEQUENT ENCOUNTER: ICD-10-CM

## 2021-12-29 DIAGNOSIS — M17.12 ARTHRITIS OF LEFT KNEE: Primary | ICD-10-CM

## 2021-12-29 LAB
HCT VFR BLD AUTO: 37.2 % (ref 37–48.5)
HGB BLD-MCNC: 11.7 G/DL (ref 12–16)

## 2021-12-29 PROCEDURE — 25000003 PHARM REV CODE 250: Performed by: ORTHOPAEDIC SURGERY

## 2021-12-29 PROCEDURE — 63600175 PHARM REV CODE 636 W HCPCS: Performed by: NURSE ANESTHETIST, CERTIFIED REGISTERED

## 2021-12-29 PROCEDURE — 85018 HEMOGLOBIN: CPT | Performed by: ORTHOPAEDIC SURGERY

## 2021-12-29 PROCEDURE — C1713 ANCHOR/SCREW BN/BN,TIS/BN: HCPCS | Performed by: ORTHOPAEDIC SURGERY

## 2021-12-29 PROCEDURE — 71000039 HC RECOVERY, EACH ADD'L HOUR: Performed by: ORTHOPAEDIC SURGERY

## 2021-12-29 PROCEDURE — 37000008 HC ANESTHESIA 1ST 15 MINUTES: Performed by: ORTHOPAEDIC SURGERY

## 2021-12-29 PROCEDURE — 97530 THERAPEUTIC ACTIVITIES: CPT

## 2021-12-29 PROCEDURE — 36000710: Performed by: ORTHOPAEDIC SURGERY

## 2021-12-29 PROCEDURE — 71000015 HC POSTOP RECOV 1ST HR: Performed by: ORTHOPAEDIC SURGERY

## 2021-12-29 PROCEDURE — C1776 JOINT DEVICE (IMPLANTABLE): HCPCS | Performed by: ORTHOPAEDIC SURGERY

## 2021-12-29 PROCEDURE — 71000016 HC POSTOP RECOV ADDL HR: Performed by: ORTHOPAEDIC SURGERY

## 2021-12-29 PROCEDURE — 97530 THERAPEUTIC ACTIVITIES: CPT | Performed by: PHYSICAL THERAPIST

## 2021-12-29 PROCEDURE — 85014 HEMATOCRIT: CPT | Performed by: ORTHOPAEDIC SURGERY

## 2021-12-29 PROCEDURE — 63600175 PHARM REV CODE 636 W HCPCS: Performed by: ORTHOPAEDIC SURGERY

## 2021-12-29 PROCEDURE — 27201423 OPTIME MED/SURG SUP & DEVICES STERILE SUPPLY: Performed by: ORTHOPAEDIC SURGERY

## 2021-12-29 PROCEDURE — 25000003 PHARM REV CODE 250: Performed by: ANESTHESIOLOGY

## 2021-12-29 PROCEDURE — 97162 PT EVAL MOD COMPLEX 30 MIN: CPT

## 2021-12-29 PROCEDURE — 63600175 PHARM REV CODE 636 W HCPCS: Performed by: ANESTHESIOLOGY

## 2021-12-29 PROCEDURE — 97166 OT EVAL MOD COMPLEX 45 MIN: CPT | Performed by: PHYSICAL THERAPIST

## 2021-12-29 PROCEDURE — 71000033 HC RECOVERY, INTIAL HOUR: Performed by: ORTHOPAEDIC SURGERY

## 2021-12-29 PROCEDURE — 37000009 HC ANESTHESIA EA ADD 15 MINS: Performed by: ORTHOPAEDIC SURGERY

## 2021-12-29 PROCEDURE — 36000711: Performed by: ORTHOPAEDIC SURGERY

## 2021-12-29 DEVICE — SIMPLEX® HV WITH GENTAMICIN IS A FAST-SETTING ACRYLIC RESIN WITH ADDITION OF GENTAMICIN SULFATE FOR USE IN BONE SURGERY. MIXING THE TWO SEPARATE STERILE COMPONENTS PRODUCES A DUCTILE BONE CEMENT WHICH, AFTER HARDENING, FIXES THE IMPLANT AND TRANSFERS STRESSES PRODUCED DURING MOVEMENT EVENLY TO THE BONE. SIMPLEX® HV WITH GENTAMICINN CEMENT POWDER ALSO CONTAINS INSOLUBLE ZIRCONIUM DIOXIDE AS AN X-RAY CONTRAST MEDIUM. SIMPLEX® HV WITH GENTAMICIN DOES NOT EMIT A SIGNAL AND DOES NOT POSE A SAFETY RISK IN A MAGNETIC RESONANCE ENVIRONMENT.
Type: IMPLANTABLE DEVICE | Site: KNEE | Status: FUNCTIONAL
Brand: SIMPLEX HV WITH GENTAMICIN

## 2021-12-29 DEVICE — XPE TIBIAL INSERT, PS, #4, 11MM
Type: IMPLANTABLE DEVICE | Site: KNEE | Status: FUNCTIONAL
Brand: XPE TIBIAL INSERT, PS

## 2021-12-29 DEVICE — XPE PATELLAR, ON SET, 3 PEGS, MEDIUM,Ïˆ 32 MM
Type: IMPLANTABLE DEVICE | Site: KNEE | Status: FUNCTIONAL
Brand: XPE PATELLAR, ON SET, 3 PEGS

## 2021-12-29 DEVICE — U2 TIBIAL BASEPLATE, CMA, #4
Type: IMPLANTABLE DEVICE | Site: KNEE | Status: FUNCTIONAL
Brand: U2 TIBIAL BASEPLATE, CMA

## 2021-12-29 DEVICE — U2 FEMORAL COMPONENT, PS, #5, LEFT
Type: IMPLANTABLE DEVICE | Site: KNEE | Status: FUNCTIONAL
Brand: U2 FEMORAL COMPONENT, PS

## 2021-12-29 RX ORDER — CEFAZOLIN SODIUM 2 G/50ML
2 SOLUTION INTRAVENOUS
Status: DISCONTINUED | OUTPATIENT
Start: 2021-12-29 | End: 2021-12-29

## 2021-12-29 RX ORDER — MIDAZOLAM HYDROCHLORIDE 1 MG/ML
INJECTION INTRAMUSCULAR; INTRAVENOUS
Status: DISCONTINUED | OUTPATIENT
Start: 2021-12-29 | End: 2021-12-29

## 2021-12-29 RX ORDER — OXYCODONE HYDROCHLORIDE 5 MG/1
5 TABLET ORAL
Status: DISCONTINUED | OUTPATIENT
Start: 2021-12-29 | End: 2021-12-29 | Stop reason: HOSPADM

## 2021-12-29 RX ORDER — DOCUSATE SODIUM 100 MG/1
100 CAPSULE, LIQUID FILLED ORAL 2 TIMES DAILY
Status: DISCONTINUED | OUTPATIENT
Start: 2021-12-29 | End: 2021-12-29 | Stop reason: HOSPADM

## 2021-12-29 RX ORDER — ROCURONIUM BROMIDE 10 MG/ML
INJECTION, SOLUTION INTRAVENOUS
Status: DISCONTINUED | OUTPATIENT
Start: 2021-12-29 | End: 2021-12-29

## 2021-12-29 RX ORDER — BISACODYL 10 MG
10 SUPPOSITORY, RECTAL RECTAL DAILY PRN
Status: DISCONTINUED | OUTPATIENT
Start: 2021-12-29 | End: 2021-12-29 | Stop reason: HOSPADM

## 2021-12-29 RX ORDER — ONDANSETRON 2 MG/ML
4 INJECTION INTRAMUSCULAR; INTRAVENOUS EVERY 12 HOURS PRN
Status: DISCONTINUED | OUTPATIENT
Start: 2021-12-29 | End: 2021-12-29 | Stop reason: HOSPADM

## 2021-12-29 RX ORDER — HYDROMORPHONE HYDROCHLORIDE 2 MG/ML
0.2 INJECTION, SOLUTION INTRAMUSCULAR; INTRAVENOUS; SUBCUTANEOUS EVERY 5 MIN PRN
Status: DISCONTINUED | OUTPATIENT
Start: 2021-12-29 | End: 2021-12-29 | Stop reason: HOSPADM

## 2021-12-29 RX ORDER — SODIUM CHLORIDE 9 MG/ML
INJECTION, SOLUTION INTRAVENOUS CONTINUOUS
Status: DISCONTINUED | OUTPATIENT
Start: 2021-12-29 | End: 2021-12-29 | Stop reason: HOSPADM

## 2021-12-29 RX ORDER — MORPHINE SULFATE 4 MG/ML
2 INJECTION, SOLUTION INTRAMUSCULAR; INTRAVENOUS EVERY 4 HOURS PRN
Status: DISCONTINUED | OUTPATIENT
Start: 2021-12-29 | End: 2021-12-29 | Stop reason: HOSPADM

## 2021-12-29 RX ORDER — CEFAZOLIN SODIUM 2 G/50ML
2 SOLUTION INTRAVENOUS
Status: DISCONTINUED | OUTPATIENT
Start: 2021-12-29 | End: 2021-12-29 | Stop reason: HOSPADM

## 2021-12-29 RX ORDER — ROPIVACAINE/EPI/CLONIDINE/KET 2.46-0.005
SYRINGE (ML) INJECTION
Status: DISCONTINUED | OUTPATIENT
Start: 2021-12-29 | End: 2021-12-29 | Stop reason: HOSPADM

## 2021-12-29 RX ORDER — SODIUM CHLORIDE 0.9 % (FLUSH) 0.9 %
10 SYRINGE (ML) INJECTION EVERY 6 HOURS
Status: DISCONTINUED | OUTPATIENT
Start: 2021-12-29 | End: 2021-12-29 | Stop reason: HOSPADM

## 2021-12-29 RX ORDER — NEOSTIGMINE METHYLSULFATE 1 MG/ML
INJECTION, SOLUTION INTRAVENOUS
Status: DISCONTINUED | OUTPATIENT
Start: 2021-12-29 | End: 2021-12-29

## 2021-12-29 RX ORDER — KETAMINE HYDROCHLORIDE 50 MG/ML
INJECTION, SOLUTION INTRAMUSCULAR; INTRAVENOUS
Status: DISCONTINUED | OUTPATIENT
Start: 2021-12-29 | End: 2021-12-29

## 2021-12-29 RX ORDER — OXYCODONE AND ACETAMINOPHEN 5; 325 MG/1; MG/1
1 TABLET ORAL
Status: DISCONTINUED | OUTPATIENT
Start: 2021-12-29 | End: 2021-12-29 | Stop reason: HOSPADM

## 2021-12-29 RX ORDER — ZOLPIDEM TARTRATE 5 MG/1
5 TABLET ORAL NIGHTLY PRN
Status: DISCONTINUED | OUTPATIENT
Start: 2021-12-29 | End: 2021-12-29 | Stop reason: HOSPADM

## 2021-12-29 RX ORDER — ACETAMINOPHEN 325 MG/1
650 TABLET ORAL EVERY 4 HOURS PRN
Status: DISCONTINUED | OUTPATIENT
Start: 2021-12-29 | End: 2021-12-29 | Stop reason: HOSPADM

## 2021-12-29 RX ORDER — ONDANSETRON 2 MG/ML
4 INJECTION INTRAMUSCULAR; INTRAVENOUS DAILY PRN
Status: DISCONTINUED | OUTPATIENT
Start: 2021-12-29 | End: 2021-12-29 | Stop reason: HOSPADM

## 2021-12-29 RX ORDER — PROPOFOL 10 MG/ML
VIAL (ML) INTRAVENOUS
Status: DISCONTINUED | OUTPATIENT
Start: 2021-12-29 | End: 2021-12-29

## 2021-12-29 RX ORDER — ONDANSETRON 2 MG/ML
INJECTION INTRAMUSCULAR; INTRAVENOUS
Status: DISCONTINUED | OUTPATIENT
Start: 2021-12-29 | End: 2021-12-29

## 2021-12-29 RX ORDER — CEFAZOLIN SODIUM 2 G/50ML
2 SOLUTION INTRAVENOUS
Status: COMPLETED | OUTPATIENT
Start: 2021-12-29 | End: 2021-12-29

## 2021-12-29 RX ORDER — CHLORHEXIDINE GLUCONATE ORAL RINSE 1.2 MG/ML
10 SOLUTION DENTAL
Status: DISCONTINUED | OUTPATIENT
Start: 2021-12-29 | End: 2021-12-29 | Stop reason: HOSPADM

## 2021-12-29 RX ORDER — ACETAMINOPHEN 325 MG/1
650 TABLET ORAL EVERY 8 HOURS PRN
Status: DISCONTINUED | OUTPATIENT
Start: 2021-12-29 | End: 2021-12-29 | Stop reason: HOSPADM

## 2021-12-29 RX ORDER — OXYCODONE HYDROCHLORIDE 5 MG/1
10 TABLET ORAL
Status: DISCONTINUED | OUTPATIENT
Start: 2021-12-29 | End: 2021-12-29 | Stop reason: HOSPADM

## 2021-12-29 RX ORDER — DEXAMETHASONE SODIUM PHOSPHATE 4 MG/ML
8 INJECTION, SOLUTION INTRA-ARTICULAR; INTRALESIONAL; INTRAMUSCULAR; INTRAVENOUS; SOFT TISSUE ONCE
Status: DISCONTINUED | OUTPATIENT
Start: 2021-12-29 | End: 2021-12-29 | Stop reason: HOSPADM

## 2021-12-29 RX ORDER — CHLORHEXIDINE GLUCONATE ORAL RINSE 1.2 MG/ML
10 SOLUTION DENTAL 2 TIMES DAILY
Status: DISCONTINUED | OUTPATIENT
Start: 2021-12-29 | End: 2021-12-29 | Stop reason: HOSPADM

## 2021-12-29 RX ORDER — FENTANYL CITRATE 50 UG/ML
INJECTION, SOLUTION INTRAMUSCULAR; INTRAVENOUS
Status: DISCONTINUED | OUTPATIENT
Start: 2021-12-29 | End: 2021-12-29

## 2021-12-29 RX ORDER — TRANEXAMIC ACID 100 MG/ML
1000 INJECTION, SOLUTION INTRAVENOUS
Status: COMPLETED | OUTPATIENT
Start: 2021-12-29 | End: 2021-12-29

## 2021-12-29 RX ORDER — SODIUM CHLORIDE, SODIUM LACTATE, POTASSIUM CHLORIDE, CALCIUM CHLORIDE 600; 310; 30; 20 MG/100ML; MG/100ML; MG/100ML; MG/100ML
INJECTION, SOLUTION INTRAVENOUS CONTINUOUS PRN
Status: DISCONTINUED | OUTPATIENT
Start: 2021-12-29 | End: 2021-12-29

## 2021-12-29 RX ORDER — SUCCINYLCHOLINE CHLORIDE 20 MG/ML
INJECTION INTRAMUSCULAR; INTRAVENOUS
Status: DISCONTINUED | OUTPATIENT
Start: 2021-12-29 | End: 2021-12-29

## 2021-12-29 RX ORDER — DEXAMETHASONE SODIUM PHOSPHATE 4 MG/ML
INJECTION, SOLUTION INTRA-ARTICULAR; INTRALESIONAL; INTRAMUSCULAR; INTRAVENOUS; SOFT TISSUE
Status: DISCONTINUED | OUTPATIENT
Start: 2021-12-29 | End: 2021-12-29

## 2021-12-29 RX ORDER — GABAPENTIN 300 MG/1
300 CAPSULE ORAL 2 TIMES DAILY
Status: DISCONTINUED | OUTPATIENT
Start: 2021-12-29 | End: 2021-12-29 | Stop reason: HOSPADM

## 2021-12-29 RX ORDER — METOPROLOL TARTRATE 1 MG/ML
INJECTION, SOLUTION INTRAVENOUS
Status: DISCONTINUED | OUTPATIENT
Start: 2021-12-29 | End: 2021-12-29

## 2021-12-29 RX ORDER — OXYCODONE AND ACETAMINOPHEN 10; 325 MG/1; MG/1
1 TABLET ORAL EVERY 6 HOURS PRN
Qty: 28 TABLET | Refills: 0 | Status: SHIPPED | OUTPATIENT
Start: 2021-12-29 | End: 2022-01-18 | Stop reason: SDUPTHER

## 2021-12-29 RX ADMIN — SODIUM CHLORIDE, SODIUM LACTATE, POTASSIUM CHLORIDE, AND CALCIUM CHLORIDE: .6; .31; .03; .02 INJECTION, SOLUTION INTRAVENOUS at 09:12

## 2021-12-29 RX ADMIN — ROCURONIUM BROMIDE 20 MG: 10 INJECTION, SOLUTION INTRAVENOUS at 10:12

## 2021-12-29 RX ADMIN — MIDAZOLAM HYDROCHLORIDE 2 MG: 1 INJECTION, SOLUTION INTRAMUSCULAR; INTRAVENOUS at 09:12

## 2021-12-29 RX ADMIN — KETAMINE HYDROCHLORIDE 25 MG: 50 INJECTION, SOLUTION, CONCENTRATE INTRAMUSCULAR; INTRAVENOUS at 10:12

## 2021-12-29 RX ADMIN — METOPROLOL TARTRATE 2.5 MG: 1 INJECTION, SOLUTION INTRAVENOUS at 10:12

## 2021-12-29 RX ADMIN — GABAPENTIN 300 MG: 300 CAPSULE ORAL at 09:12

## 2021-12-29 RX ADMIN — ACETAMINOPHEN 650 MG: 325 TABLET ORAL at 09:12

## 2021-12-29 RX ADMIN — FENTANYL CITRATE 50 MCG: 50 INJECTION, SOLUTION INTRAMUSCULAR; INTRAVENOUS at 09:12

## 2021-12-29 RX ADMIN — FENTANYL CITRATE 50 MCG: 50 INJECTION, SOLUTION INTRAMUSCULAR; INTRAVENOUS at 10:12

## 2021-12-29 RX ADMIN — FENTANYL CITRATE 25 MCG: 50 INJECTION, SOLUTION INTRAMUSCULAR; INTRAVENOUS at 09:12

## 2021-12-29 RX ADMIN — TRANEXAMIC ACID 1000 MG: 100 INJECTION, SOLUTION INTRAVENOUS at 10:12

## 2021-12-29 RX ADMIN — HYDROMORPHONE HYDROCHLORIDE 0.2 MG: 2 INJECTION INTRAMUSCULAR; INTRAVENOUS; SUBCUTANEOUS at 12:12

## 2021-12-29 RX ADMIN — CEFAZOLIN SODIUM 3 G: 2 SOLUTION INTRAVENOUS at 10:12

## 2021-12-29 RX ADMIN — DEXAMETHASONE SODIUM PHOSPHATE 8 MG: 4 INJECTION, SOLUTION INTRAMUSCULAR; INTRAVENOUS at 10:12

## 2021-12-29 RX ADMIN — HYDROMORPHONE HYDROCHLORIDE 0.2 MG: 2 INJECTION INTRAMUSCULAR; INTRAVENOUS; SUBCUTANEOUS at 01:12

## 2021-12-29 RX ADMIN — PROPOFOL 40 MG: 10 INJECTION, EMULSION INTRAVENOUS at 10:12

## 2021-12-29 RX ADMIN — PROPOFOL 150 MG: 10 INJECTION, EMULSION INTRAVENOUS at 09:12

## 2021-12-29 RX ADMIN — SUCCINYLCHOLINE CHLORIDE 140 MG: 20 INJECTION, SOLUTION INTRAMUSCULAR; INTRAVENOUS at 09:12

## 2021-12-29 RX ADMIN — PROPOFOL 50 MG: 10 INJECTION, EMULSION INTRAVENOUS at 10:12

## 2021-12-29 RX ADMIN — GLYCOPYRROLATE 0.4 MG: 0.2 INJECTION, SOLUTION INTRAMUSCULAR; INTRAVENOUS at 11:12

## 2021-12-29 RX ADMIN — SODIUM CHLORIDE, SODIUM LACTATE, POTASSIUM CHLORIDE, AND CALCIUM CHLORIDE: .6; .31; .03; .02 INJECTION, SOLUTION INTRAVENOUS at 10:12

## 2021-12-29 RX ADMIN — ROCURONIUM BROMIDE 5 MG: 10 INJECTION, SOLUTION INTRAVENOUS at 09:12

## 2021-12-29 RX ADMIN — TRANEXAMIC ACID 1000 MG: 100 INJECTION, SOLUTION INTRAVENOUS at 11:12

## 2021-12-29 RX ADMIN — CHLORHEXIDINE GLUCONATE 0.12% ORAL RINSE 10 ML: 1.2 LIQUID ORAL at 09:12

## 2021-12-29 RX ADMIN — ONDANSETRON 4 MG: 2 INJECTION, SOLUTION INTRAMUSCULAR; INTRAVENOUS at 10:12

## 2021-12-29 RX ADMIN — NEOSTIGMINE METHYLSULFATE 3 MG: 1 INJECTION INTRAVENOUS at 11:12

## 2021-12-29 NOTE — PLAN OF CARE
P.T. EVAL COMPLETE, PT CURRENTLY REQUIRES [facility] FOR BED MOBILITY AND TF'S, CGA FOR GAIT WITH RW, P.T. RECOMMENDS HHPT

## 2021-12-29 NOTE — PLAN OF CARE
[facility] (Hospital)  Discharge Final Note    Primary Care Provider: Mini Mcdonald MD    Expected Discharge Date: 12/29/2021    Final Discharge Note (most recent)     Final Note - 12/29/21 1333        Final Note    Assessment Type Final Discharge Note     Anticipated Discharge Disposition Home-Health Care [facility] [place]     Hospital Resources/Appts/Education Provided Appointments scheduled and added to AVS        Post-Acute Status    Post-Acute Authorization Home Health     Home Health Status Set-up Complete/Auth obtained                 Important Message from Medicare             Contact Info     Alf Santana MD   Specialty: Orthopedic Surgery    [address] [place] [address] [address] [place] [address]   Phone: [phone]       Next Steps: Follow up in 2 week(s)    Alf Santana MD   Specialty: Orthopedic Surgery    [address] [place] [address] [address] [place] [address]   Phone: [phone]       Next Steps: Follow up in 2 week(s)    [facility]   Specialty: Home Health Services    [address]  [address]  [place] [address]   Phone: [phone]       Next Steps: Follow up        Jan132022 Post OP with SAL Carter  Thursday Jan 13, 2022 11:00 AM  Arrive at check-in approximately 15 minutes before your scheduled appointment time. Bring all outside medical records and imaging, along with a list of your current medications and insurance card.  Please take [address] for the Medical Office Building. Check in on the first floor. [facility] Orthopedics  [address] [place] [address] Drive   [facility] [id number] [phone]

## 2021-12-29 NOTE — PATIENT INSTRUCTIONS
Patient instructions for joint replacement    Before surgery  1.  Shower with Hibiclens soap/antibacterial for 3 days prior to surgery to decrease risk of infection  2..  Stop all blood thinners/aspirin, Coumadin, warfarin, Plavix, Eliquis, Xarelto etc 5 days prior to surgery  3.  Take Celebrex 400 mg the night prior to surgery after dinner or meloxicam 15 mg  4.  Take Tylenol 650 mg the night prior to surgery    Ask physicians for prescription of Celebrex or Mobic if needed    After surgery at home  1.  Take Tylenol 650 mg 3 times a day for 14 days then as needed for mild pain  2.  Take gabapentin 300 mg nightly for 6 weeks  3.  Take Celebrex 200 mg or meloxicam 15 mg daily for 6 weeks unless having cardiac issues to take for 2 weeks only  4.  Must take aspirin 81 mg twice a day for 6 weeks unless you are on other blood thinners/Plavix, Eliquis, Xarelto, Coumadin etc  5.  Must wear compressive stockings for 6 weeks minimum to decrease the risk of blood clot and swelling  6.  Hydrocodone/Norco or oxycodone/Percocet will be prescribed to take every 6 hr as needed for breakthrough pain  7.  May apply ice on the knee to help with decreasing pain  8.  Keep wound dry for 2 weeks until stitches/staples removed than you will be allowed to shower in 24 hr and get the wound wet.  No soaking of the wound in the tub or swimming for 4 weeks after surgery  9.  No driving for 4 weeks unless specified by physician  10.  Avoid touching the wound or surrounding area /at least 2 inches on each side of the surgical incision until staples are removed/stitches   11.  May change the surgical dressing if extremely bloody or has drainage on it. May clean the wound with peroxide or Betadine and apply sterile dressing and Ace wrap over it  12.  Leave hospital dressing on for 3 days then may change by applying sterile 4 x 4 and Ace wrap after cleaning with Betadine or peroxide.  May leave this dressing change to home health nurses

## 2021-12-29 NOTE — PLAN OF CARE
GEORGE communicated with Renetta in PT/OT department.  The PT/OT department has a donated rolling walker that still has tags on it that can be provided to the patient when she is seen in the recovery area.

## 2021-12-29 NOTE — CONSULTS
Patient accepted by [facility].  Rolling walker will be supplied from donated equipment in therapy department.  Patient is able to discharge from the  standpoint

## 2021-12-29 NOTE — PLAN OF CARE
[facility] (Hospital)  Discharge Assessment    Primary Care Provider: Mini Mcdonald MD     Discharge Assessment (most recent)     BRIEF DISCHARGE ASSESSMENT - 12/29/21 1248        Discharge Planning    Assessment Type Discharge Planning Brief Assessment     Resource/Environmental Concerns none     Support Systems Family members     Equipment Currently Used at Home none     Current Living Arrangements home/apartment/condo     Patient/Family Anticipates Transition to home with help/services     Patient/Family Anticipated Services at Transition home health care     DME Needed Upon Discharge  walker, rolling     Discharge Plan A Home with family;Home Health     Discharge Plan B Home with family;Home Health                 CM reached out to patient's sister, Sunita, who is here at the hospital with patient to complete the discharge planning assessment.  CM discussed home health services through [facility].  Authorization has been obtained from the  carrier by [facility] prior to admission to the hospital. Patient does not have a rolling walker at home and will need the walker prior to discharge.  CM is awaiting for [facility] to approve equipment.  If equipment is not approved authorization will have to be obtained through the  .

## 2021-12-29 NOTE — PT/OT/SLP EVAL
Occupational Therapy   Evaluation    Name: Madhuri Hickman  MRN: [id number]  Admitting Diagnosis:  <principal problem not specified>  Recent Surgery: Procedure(s) (LRB):  ARTHROPLASTY, KNEE, TOTAL (Left) Day of Surgery    Recommendations:     Discharge Recommendations: home with home health  Discharge Equipment Recommendations:  walker, rolling,bedside commode,bath bench  Barriers to discharge:   UNKNOWN    Assessment:     Madhuri Hickman is a 66 y.o. female with a medical diagnosis of <principal problem not specified>.  She presents with impaired functional mobility and ADLs. Performance deficits affecting function: weakness,impaired endurance,impaired self care skills,impaired functional mobilty,gait instability,impaired balance,decreased coordination,decreased lower extremity function,decreased safety awareness,pain,decreased ROM,impaired coordination,edema,impaired cardiopulmonary response to activity,orthopedic precautions.      Rehab Prognosis: Fair; patient would benefit from acute skilled OT services to address these deficits and reach maximum level of function.       Plan:     Patient to be seen 2 x/week to address the above listed problems via self-care/home management,therapeutic activities,therapeutic exercises  · Plan of Care Expires: 01/12/22  · Plan of Care Reviewed with: patient,sibling    Subjective     Chief Complaint: (L) Knee pain  Patient/Family Comments/goals: to increase strength and mobility     Occupational Profile:  Living Environment: lives alone in 1 story house with 3 steps and 1 handrail  Previous level of function: (I) with ADLs and Ambulates community distances without AD  Roles and Routines: Works, drives  Equipment Used at Home:  none  Assistance upon Discharge: sister    Pain/Comfort:  · Pain Rating 1: 8/10  · Location - Side 1: Left  · Location - Orientation 1: generalized  · Location 1: knee  · Pain Addressed 1: Reposition,Distraction    Patients cultural, spiritual,  Cheondoism conflicts given the current situation:      Objective:     Communicated with: Nurse Palomo and [facility] chart review prior to session.  Patient found HOB elevated with peripheral IV,wound vac upon OT entry to room.    General Precautions: Standard, fall   Orthopedic Precautions:LLE weight bearing as tolerated   Braces: N/A  Respiratory Status: Room air    Occupational Performance:    Bed Mobility:    · Patient completed Rolling/Turning to Left with  minimum assistance  · Patient completed Rolling/Turning to Right with minimum assistance  · Patient completed Scooting/Bridging with stand by assistance  · Patient completed Supine to Sit with minimum assistance  · Patient completed Sit to Supine with minimum assistance    Functional Mobility/Transfers:  · Patient completed Sit <> Stand Transfer with minimum assistance  with  rolling walker   · Patient completed Bed <> Chair Transfer using Step Transfer technique with minimum assistance with rolling walker  · Functional Mobility: ~5ft with CGA using RW, 1 seated rest break then 15ft using RW with CGA    Activities of Daily Living:  · Upper Body Dressing: minimum assistance .  · Lower Body Dressing: moderate assistance .    Cognitive/Visual Perceptual:  Cognitive/Psychosocial Skills:     -       Oriented to: Person, Place, Time and Situation   -       Follows Commands/attention:Follows two-step commands  -       Communication: clear/fluent  -       Memory: No Deficits noted  -       Safety awareness/insight to disability: impaired   -       Mood/Affect/Coping skills/emotional control: Appropriate to situation and Sleepy  Visual/Perceptual:      -Intact .    Physical Exam:  Balance:    -       Fair standing  Postural examination/scapula alignment:    -       Rounded shoulders  -       Forward head  Edema:  (L) LE  Sensation:    -       Intact  Motor Planning:    -       WNL  Dominant hand:    -       right  Upper Extremity Range of Motion:     -       Right Upper  Extremity: WNL  -       Left Upper Extremity: WNL  Upper Extremity Strength:    -       Right Upper Extremity: WNL  -       Left Upper Extremity: WNL   Strength:    -       Right Upper Extremity: WNL  -       Left Upper Extremity: WNL    AMPAC 6 Click ADL:  AMPAC Total Score: 17    Treatment & Education:  OT Eval completed as listed above.   Pt educated in safety with bed mobility and t/fs, car t/fs, ascend/ descend stairs, role of OT and POC. Pt encouraged to increase time OOB, however pt reported that she was too sleepy at this time and requested to return to bed. Pt assisted back to supine with MiN A.   Education:    Patient left HOB elevated with all lines intact, call button in reach, Nurse notified and sister present    GOALS:   Multidisciplinary Problems     Occupational Therapy Goals        Problem: Occupational Therapy Goal    Goal Priority Disciplines Outcome Interventions   Occupational Therapy Goal     OT, PT/OT     Description: Goals to be met by: 1/12/22     Patient will increase functional independence with ADLs by performing:    LE Dressing with Supervision.  Grooming while standing at sink with Supervision.  Step transfer to toilet with Supervision  Upper extremity exercise program x20 reps per handout, with independence.                     History:     Past Medical History:   Diagnosis Date    Anxiety     Arthritis     left knee    Encounter for general adult medical examination without abnormal findings 05/09/2017    Hypertension     Vitamin D deficiency        Past Surgical History:   Procedure Laterality Date    HYSTERECTOMY      total    VAGINAL DELIVERY         Time Tracking:     OT Date of Treatment: 12/29/21  OT Start Time: 1515  OT Stop Time: 1545  OT Total Time (min): 30 min    Billable Minutes:Evaluation 15 min  Therapeutic Activity 15 min    12/29/2021

## 2021-12-29 NOTE — H&P
Subjective:     Patient ID: Madhuri Hickman is a 66 y.o. female.    Chief Complaint: No chief complaint on file.   Mild right knee pain  HPI:  09/21/2020  64-year-old  who injured her knee approximately 16 years ago at work wears a wooden steps broke while going up.  She was eventually return to work with restrictions.  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  She did not hit the ground she held onto the truck.  She stated ibuprofen 800 mg helps but she ran out she really requesting renewal.  She has not received any injections over the last several years.  She has occasional catching locking and giving way.  Pain is 6/10 that is constant worst with stairs if she tries to go up.  Unable to squat or kneel.  Unable to stand or walk too long.  She is not using any assistive devices to ambulate.  Denies any fever or chills or shortness of breath or difficulty with chewing or swallowing loss of bowel bladder control or blurry vision or double vision or numbness or tingling in the legs or hands    11/09/2020  Steroid injection into the left knee as well as ibuprofen seems to have helped given on 09/21.  The story goes that she slipped getting out of her mail truck and slipped and grass.  She does have pre-existing arthritis in her left knee and this fall aggravated it.  She states the physical therapy is helping the injection helped the ibuprofen is helping.  Still afraid L catching locking.  Her pain is 5/10  Denies any fever or chills or shortness of breath or difficulty with chewing or swallowing loss of bowel bladder control.      01/07/2021  Patient arrived 21 min late for her appointment  Arthritis of both knees.  Posttraumatic left knee arthrosis.  She was instructed to take ibuprofen and Tylenol as needed.  Received steroid injection on 09/21/2020 with good relief of her pain  Still going to physical therapy at [facility].  She is doing slightly better now she can  get out of the tub instead of showering.  Walking 100 ft become severely painful has to sit down.  Still doing her dishes sitting down.  She is questioning my diagnosis at this point since her work says do not except arthritis as a condition.  I did tell her her injury worsened what she has her arthritis and considered posttraumatic arthritis of the knee.  This is a medical condition and has a code.  Since she is improving on ibuprofen and physical therapy and Tylenol no need to perform any injection.  Pain is 6/10    Denies any fever or chills or shortness of breath or difficulty with chewing or swallowing loss of bowel bladder control blurry vision double vision    02/26/2021  Left knee severe posttraumatic arthritis and right knee mild to moderate arthritis.  Numerous treatment including physical therapy, steroid injection 09/21/2020, hyaluronic injection 01/07/2021 and different NSAIDs did not seem to make be used difference just mild assistant.  She is using occasionally her cane to get around.  She is at a point she says she wants to proceed with her total knee replacement.  The ibuprofen does not seem to work as well.  She feels sometimes Tylenol work better.  Activities of daily living seems to be difficult even washing her dishes at this point seems to be impossible.    Denies any fever or chills or shortness of breath or difficulty with chewing or swallowing loss of bowel bladder control blurry vision double vision or loss of sense smell or taste    05/20/2021  Left knee severe posttraumatic arthritis.  Patient has denied total knee replacement.  She is taking gabapentin and meloxicam.  At this time she is not working.  Today nurse from [employer] is with her and patient agreed for her to sit and discuss.  Nurse's name Angela Camejo .  We did discuss and showed x-ray.  Discussed treatment plan which included injections of steroid different anti-inflammatories physical therapy, gabapentin..  So far she  still under review.  It eventually if that  gets denied need to go for independent medical examination  No fever no chills no shortness of breath or difficulty with chewing or swallowing loss of bowel bladder control blurry vision double vision loss sense smell or taste      07/26/2021  Left knee posttraumatic arthritis.  Things are getting worst since last time we have seen her.  Now it is locking on her in the middle of the night unable to get any relief.  She is still taking the gabapentin occasionally taking the meloxicam.  Her pain is 8/10.  Unable to stand for any .  More than 10 minutes.  She is having catching locking feeling in the knee despite not doing anything in the middle of the night also.  Unable to do steps.  Walking from the parking lot to the office was very difficult.  Using a cane to get around.  States she was scared that her knee locked on her in the middle of the night and could not do anything for it until slowly unlocked itself by the next day.  These episodes are becoming more frequent than normal.  Patient had been through anti-inflammatories, gabapentin, steroid injections, viscosupplementation injections with very mild and minimal relief.  No fever no chills no shortness of breath or difficulty with chewing or swallowing or loss of bowel bladder control blurry vision double vision loss sense smell or taste  Past Medical History:   Diagnosis Date    Anxiety     Arthritis     left knee    Encounter for general adult medical examination without abnormal findings 05/09/2017    Hypertension     Vitamin D deficiency      Past Surgical History:   Procedure Laterality Date    HYSTERECTOMY      total    VAGINAL DELIVERY       Family History   Problem Relation Age of Onset    Diabetes Mother     Hypertension Mother     Heart disease Father     Hypertension Sister     Heart disease Brother     Diabetes Brother     Cancer Maternal Grandmother         breast     Social History      Socioeconomic History    Marital status:    Tobacco Use    Smoking status: Never Smoker    Smokeless tobacco: Never Used   Substance and Sexual Activity    Alcohol use: Yes     Comment: social; hold 72hrs prior to surgery    Drug use: No    Sexual activity: Not Currently     Current Discharge Medication List      START taking these medications    Details   gabapentin (NEURONTIN) 300 MG capsule Take 1 capsule (300 mg total) by mouth every evening.  Qty: 30 capsule, Refills: 11      ondansetron (ZOFRAN-ODT) 4 MG TbDL Dissolve 2 tablets (8 mg total) by mouth every 8 (eight) hours as needed.  Qty: 20 tablet, Refills: 0      oxyCODONE-acetaminophen (PERCOCET)  mg per tablet Take 1 tablet by mouth every 6 (six) hours as needed for Pain.  Qty: 28 tablet, Refills: 0    Comments: Quantity prescribed more than 7 day supply? Yes, quantity medically necessary         CONTINUE these medications which have NOT CHANGED    Details   amlodipine-benazepril 10-20mg (LOTREL) 10-20 mg per capsule Take 1 capsule by mouth once daily.  Qty: 90 capsule, Refills: 1      aspirin 81 MG Chew Take 81 mg by mouth once daily.      chlorthalidone (HYGROTEN) 25 MG Tab Take 1 tablet (25 mg total) by mouth once daily.  Qty: 90 tablet, Refills: 1    Comments: .      meloxicam (MOBIC) 15 MG tablet Take 1 tablet (15 mg total) by mouth once daily. Take with food  Qty: 90 tablet, Refills: 1    Associated Diagnoses: Post-traumatic osteoarthritis of left knee      metFORMIN (GLUCOPHAGE-XR) 500 MG ER 24hr tablet Take 2 tablets (1,000 mg total) by mouth once daily.  Qty: 180 tablet, Refills: 1      ergocalciferol (ERGOCALCIFEROL) 50,000 unit Cap Take 1 capsule (50,000 Units total) by mouth every 7 days.  Qty: 12 capsule, Refills: 1           Review of patient's allergies indicates:  No Known Allergies  Review of Systems   Constitutional: Negative for decreased appetite.   HENT: Negative for tinnitus.    Eyes: Negative for double vision.    Cardiovascular: Negative for chest pain.   Respiratory: Negative for wheezing.    Hematologic/Lymphatic: Negative for bleeding problem.   Skin: Negative for dry skin.   Musculoskeletal: Positive for arthritis, falls, joint pain, joint swelling and stiffness. Negative for back pain, gout and neck pain.   Gastrointestinal: Negative for abdominal pain.   Genitourinary: Negative for bladder incontinence.   Neurological: Negative for numbness, paresthesias and sensory change.   Psychiatric/Behavioral: Negative for altered mental status.       Objective:   Body mass index is 42.78 kg/m².  There were no vitals filed for this visit.       General    Constitutional: She is oriented to person, place, and time. She appears well-developed.   HENT:   Head: Atraumatic.   Eyes: EOM are normal.   Cardiovascular: Normal rate.    Pulmonary/Chest: Effort normal.   Neurological: She is alert and oriented to person, place, and time.   Psychiatric: Judgment normal.            Ambulates with a limp  Cervical rotation very functional  Bilateral upper extremity neurovascularly intact.  Radial and ulnar pulses 2+.  Strength is 5/5 throughout motor groups.  Slight weakness in the right deltoid to resistive abduction.  Lumbar with mild discomfort in the lumbar area around L4-5 paraspinal .  Straight leg raising is negative.  Pelvis is level  Bilateral hips passive motion without pain or limitations.  There is no pain to palpation over the trochanters.  Hip flexors, abductors, adductors, quads, hamstrings, ankle extensors and flexors inverters and everters all 5/5  Left knee with varus deformity.  5° of contractures.  Flexes 120°.  Collaterals and cruciate is are stable.  Very mild swelling.  Crepitus to compression on the patella and medially.  Pain is medially and anteriorly.  She has been tape by at physical therapy.  Mild swelling.  Right knee mild medial joint pain.  No swelling.  Motion 0-130 degrees.  Collaterals and cruciates are  stable.  Negative Bievnenido sign  Bilateral calves are soft nontender  Slight swelling around the ankle  DP 1+  PT 1+  Skin is warm to touch no obvious lesions.  Sensory intact to touch    Relevant imaging results reviewed and interpreted by me, discussed with the patient and / or family today     MRI Knee Without Contrast Left  Narrative: EXAMINATION:  MRI KNEE WITHOUT CONTRAST LEFT    CLINICAL HISTORY:  rule out tear;Unilateral post-traumatic osteoarthritis, left knee    TECHNIQUE:  Multiplanar, multisequence images were preformed about the knee without administration of intravenous contrast.    COMPARISON:  Left knee radiographs September 3, 2020    FINDINGS:  Medial compartment:   There is a complex tear involving nearly the entirety of the medial meniscus.  Only a small amount of residual meniscal tissue is noted within the anterior horn.  There is extensive full-thickness chondrosis involving the majority of the weight-bearing portion of the medial femoral condyle and adjacent tibial plateau.  There is mild amount of residual cartilage along the mesial aspect of the weight-bearing medial femoral condyle.  There are a few mild patchy areas of subchondral edema within the medial femoral condyle and medial tibial plateau without evidence of fracture.    Lateral compartment:   There is a complex tear involving the anterior horn, body, and posterior horn of the lateral meniscus which also extends to involve the lateral meniscal root.  Anterior horn of the lateral meniscus not well visualized.  There is a small segment of normal appearing meniscus at the junction of the body and posterior horn.  There is extensive full-thickness chondrosis involving the majority of the weight-bearing lateral femoral condyle and adjacent tibial plateau.  Mild patchy subchondral edema within the lateral femoral condyle and lateral tibial plateau a tight evidence of fracture.    Patellofemoral compartment:  Moderate partial-thickness  chondrosis within the patellofemoral compartment predominantly involving the medial patellar facet and adjacent trochlea.  Mild subchondral edema without evidence of fracture.    Cruciate ligaments:  There is a chronic appearing complete tear of the anterior cruciate ligament.  Posterior cruciate ligament is intact.    Collateral ligaments:  Medial collateral ligament is intact. Lateral collateral ligament is intact.    Extensor mechanism: Normal.    Joint space:   Tiny left knee effusion.  No synovial abnormality.  There is a tiny Baker cyst.    Misc:  Visualized musculature is within normal limits.  No suspicious marrow replacing process.  Impression: 1.  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    2.  Extensive complex bilateral meniscal tears.    3.  Severe left knee tricompartmental chondrosis worst within the medial and lateral compartments.    Electronically signed by: Adrián Guevara  Date:    07/13/2021  Time:    07:30    Personally reviewed the x-rays of both knees with the patient agree with the above  X-ray 09/03/2020 bilateral knees with left knee severe loss of medial joint space and osteophytic changes anteriorly and as well as laterally.  Also the right knee has some arthritic changes with not as bad loss of joint space as the left  Assessment:     Encounter Diagnoses   Name Primary?    Post-traumatic osteoarthritis of left knee Yes    Arthritis of knee, right     Acquired valgus deformity knee, right         Plan:   Failed total right knee replacement, subsequent encounter  -     Remove dressing in 72 hours  -     Diet general  -     Referral to Home health  -     Activity as tolerated  -     Sponge bath only until clinic visit  -     Ice to affected area  -     Weight bearing as tolerated  -     Call MD for:  redness, tenderness, or signs of infection (pain, swelling, redness, odor or green/yellow discharge around incision site)  -     WALKER FOR HOME USE    Arthritis of  left knee  -     Vital signs; Standing  -     Insert peripheral IV; Standing  -     Surgeon requests no aspirin,enoxaparin,warfarin,clopidogrel,prasugrel or dabigatran; Standing  -     Clip and Prep Left Anterior Knee Mid Thigh to Mid Calf; Standing  -     Foot pump; send to OR with patient; Standing  -     Cleanse with Chlorhexidine (CHG); Standing  -     Apply Nozin; Standing  -     Diet NPO; Standing  -     Place KRISHNA hose; Standing  -     Place sequential compression device; Standing  -     Chlorohexidine Gluconate Bath; Standing  -     Nasal ; Standing  -     Place in Outpatient; Standing  -     Remove dressing in 72 hours  -     Diet general  -     Referral to Home health  -     Activity as tolerated  -     Sponge bath only until clinic visit  -     Ice to affected area  -     Weight bearing as tolerated  -     Call MD for:  redness, tenderness, or signs of infection (pain, swelling, redness, odor or green/yellow discharge around incision site)  -     WALKER FOR HOME USE    Other orders  -     sodium chloride 0.9% flush 10 mL  -     IP VTE LOW RISK PATIENT; Standing  -     chlorhexidine 0.12 % solution 10 mL  -     dexamethasone injection 8 mg  -     tranexamic acid (CYKLOKAPRON) 1,000 mg in sodium chloride 0.9 % 100 mL (ready to mix system)  -     cefazolin (ANCEF) 2 gram in dextrose 5% 50 mL IVPB (premix)  -     gabapentin capsule 300 mg  -     acetaminophen tablet 650 mg  -     oxyCODONE-acetaminophen (PERCOCET)  mg per tablet; Take 1 tablet by mouth every 6 (six) hours as needed for Pain.  Dispense: 28 tablet; Refill: 0  -     ondansetron (ZOFRAN-ODT) 4 MG TbDL; Dissolve 2 tablets (8 mg total) by mouth every 8 (eight) hours as needed.  Dispense: 20 tablet; Refill: 0  -     gabapentin (NEURONTIN) 300 MG capsule; Take 1 capsule (300 mg total) by mouth every evening.  Dispense: 30 capsule; Refill: 11  -     Notify Physician - Potential Need of Opioid Reversal; Standing         There are no  outpatient Patient Instructions on file for this admission.    Patient at this time cannot perform any duties due to the severity of the pain into worsening of her condition.  It I cannot have her return to work at any capacity at this time.  She needs to have her total knee replacement with recuperating period of 3 months postop prior to returning to her job.  Total knee replacements are around 78-80% successful in decreasing pain increasing function the not 100% either.  Disclaimer: This note was prepared using a voice recognition system and is likely to have sound alike errors within the text.

## 2021-12-29 NOTE — PT/OT/SLP EVAL
Physical Therapy Evaluation    Patient Name:  Madhuri Hickman   MRN:  [id number]    Recommendations:     Discharge Recommendations:  home health PT (WITH FAMILY ASSISTANCE AS NEEDED)   Discharge Equipment Recommendations: bedside commode,walker, rolling,bath bench   Barriers to discharge: Decreased caregiver support and 3 STEPS TO ENTER HOME    Assessment:     Madhuri Hickman is a 66 y.o. female admitted with a medical diagnosis of <principal problem not specified>.  She presents with the following impairments/functional limitations:  weakness,impaired endurance,impaired balance,gait instability,impaired functional mobilty,decreased coordination,pain,decreased safety awareness,decreased lower extremity function.    Rehab Prognosis: Good; patient would benefit from acute skilled PT services to address these deficits and reach maximum level of function.    Recent Surgery: Procedure(s) (LRB):  ARTHROPLASTY, KNEE, TOTAL (Left) Day of Surgery    Plan:     During this hospitalization, patient to be seen 3 x/week to address the identified rehab impairments via gait training,therapeutic activities,therapeutic exercises and progress toward the following goals:    · Plan of Care Expires:  01/12/22    Subjective     Chief Complaint:  Patient/Family Comments/goals:   Pain/Comfort:  · Pain Rating 1: 8/10  · Location - Side 1: Left  · Location - Orientation 1: generalized  · Location 1: knee  · Pain Addressed 1: Reposition,Distraction    Patients cultural, spiritual, Denominational conflicts given the current situation:      Living Environment:  PT LIVES ALONE, SISTER WILL BE THERE TO HELP, 1 STORY HOUSE WITH 3 STEPS TO ENTER 1 SIDE RAIL, AMB [place] LIMITED COMMUNITY DISTANCES, DRIVES AND WORKS, INDEP WITH ADL'S  Prior to admission, patients level of function was INDEP.  Equipment used at home: none.  DME owned (not currently used): none.  Upon discharge, patient will have assistance from SISTER.    Objective:     Communicated  with NURSE SANCHES prior to session.  Patient found supine with peripheral IV  upon PT entry to room.    General Precautions: Standard, fall   Orthopedic Precautions:RLE weight bearing as tolerated   Braces: N/A  Respiratory Status: Room air    Exams:  · Cognitive Exam:  Patient is oriented to Person, Place, Time, Situation and LETHARGIC, TROUBLE KEEPING EYES OPEN, RE-EDUCATED MULTIPLE TIMES DUE TO LETHARGY, REST GIVEN AS NEEDED  · Postural Exam:  Patient presented with the following abnormalities:    · -       Rounded shoulders  · -       Forward head  · Sensation:    · -       Intact  · RLE ROM: WFL  · RLE Strength: WFL  · LLE ROM: WFL  · LLE Strength: NT    Functional Mobility:  · Bed Mobility:     · Rolling Left:  stand by assistance  · Rolling Right: stand by assistance  · Scooting: stand by assistance  · Bridging: stand by assistance  · Supine to Sit: minimum assistance  · Sit to Supine: contact guard assistance  · Transfers:     · Sit to Stand:  minimum assistance with rolling walker X 5 TRIALS WITH SEATED REST  · Bed to Chair: minimum assistance with  rolling walker  using  Step Transfer  · Gait: PT AMB 5', 15' WITH RW AND MIN/CGA, SLOW PACE, LIMITED BY QUICK TO FATIGUE, LETHARGIC, CUES FOR UPRIGHT POSTURE AND RW SAFETY, STEP TO GAIT PATTERN  · Balance: POOR+    Therapeutic Activities and Exercises:   PT EDUCATED IN ROLE OF P.T. AND POC, PT EDUCATED IN RW USE AND SAFETY DURING TF'S AND GAIT, PT EDUCATED IN WBAT FOR LLE, PT EDUCATED IN LLE THEREX TO PERFORM THROUGHOUT THE DAY: HIP FLEX/EXT, LAQ, AP'S, HEEL SLIDES.  PT EDUCATED ON CORRECT TECHNIQUE FOR ASC/DESC. 3 STEPS, SISTER PRESENT FOR ALL EDUCATION.  PT RETURNED TO SUPINE PER HER REQUEST    AM-PAC 6 CLICK MOBILITY  Total Score:16     Patient left HOB elevated with all lines intact, call button in reach, NURSE notified and NURSE present.    GOALS:   Multidisciplinary Problems     Physical Therapy Goals        Problem: Physical Therapy Goal    Goal Priority  Disciplines Outcome Goal Variances Interventions   Physical Therapy Goal     PT, PT/OT      Description: LTG'S TO BE MET IN 14 DAYS (1-12-22)  1. PT WILL BE [place] WITH BED MOBILITY  2. PT WILL BE [place] WITH TF'S  3. PT WILL ' WITH RW AND SPV  4. PT WILL ASC/DESC. 3 STEPS WITH SBA                   History:     Past Medical History:   Diagnosis Date    Anxiety     Arthritis     left knee    Encounter for general adult medical examination without abnormal findings 05/09/2017    Hypertension     Vitamin D deficiency        Past Surgical History:   Procedure Laterality Date    HYSTERECTOMY      total    VAGINAL DELIVERY         Time Tracking:     PT Received On: 12/29/21  PT Start Time: 1505     PT Stop Time: 1535  PT Total Time (min): 30 min     Billable Minutes: Evaluation 15 and Therapeutic Activity 15    12/29/2021

## 2021-12-29 NOTE — PLAN OF CARE
CM contacted the department of Labor in regards to authorization for a rolling walker.  CM was informed that the [facility] DME authorization request form must be completed and sent to the department of labor for approval.  This process takes 3-5 business days for approval.

## 2021-12-29 NOTE — OP NOTE
[facility] - Surgery ([facility])  Orthopedic Surgery  Operative Note    SUMMARY     Date of Procedure: 12/29/2021     Procedure: Procedure(s) (LRB):  ARTHROPLASTY, KNEE, TOTAL (Left)       Surgeon(s) and Role:     * Alf Santana MD - Primary    Assisting Surgeon:  Operating room technician    Pre-Operative Diagnosis: Post-traumatic osteoarthritis of left knee [M17.32]    Post-Operative Diagnosis: Post-Op Diagnosis Codes:     * Post-traumatic osteoarthritis of left knee [M17.32]    Anesthesia: General    Injections/Meds: MARIA GUADALUPE with 40cc NS    Tourniquet time:  No tourniquet    Significant Surgical Tasks Conducted by the Assistant(s), if Applicable:    To hold multiple retractors to protect ligaments and neurovascular structures in order to perform surgery safely and efficiently.    Complications: none     Estimated Blood Loss (EBL):  200 mL           Implants: [employer] primary posterior stabilize total knee replacement femur size 4., tibial tray size 4., tibial insert 11 mm PS, 32 dome patella, gentamicin cement by Bobbi     Specimens: None           Condition: Good    Disposition: PACU - hemodynamically stable.    Attestation: I performed the procedure.    Description:  Medial parapatellar approach used to perform left TKA.  After patient received antibiotics and preop meds the knee was prepped and draped in usual sterile fashion. Midline incision was made 2 fingers above the superior pole of the patella to 2 fingers below.  Full-thickness skin flap raised medially and partially laterally.  Medial parapatellar incision was made to medial tibial tubercle  Medial release off the medial tibial flare perform subperiosteal elevating the tissues to the posterior medial corner of the tibia.  Patellar fat pad resected partially.  Superior capsulectomy performed.  Osteophytes removed mediolateral meniscus removed. Patella was resurfaced after measuring and free hand technique used.  Partial lateral facetectomy performed.   Patella button trial was applied and the measured and reconstituted thickness. The patella was moved laterally the knee hyperflexed.  Quarter-inch drill bit used to open the canal into the femur and the canal was suctioned.  Irrigated and suctioned again.  Canal finer inserted an intramedullary alignment jarod inserted into the femoral canal and pinned our cutting block at 5° of valgus cut. The jarod was removed and distal femur was cut through the cutting block. We measured the size of the femur and marked our rotation and then 1 cutting block applied and pinned in place and proceeded cutting the anterior condyle posterior condyle and chamfer cuts followed by box cut. The femoral canal was bone grafted.  Trial was applied on the femur and posterior osteophytes removed. Directed attention to the tibia quarter-inch drill bit used to open the canal into the tibia.  Canal Finders inserted. A gated the canal and suctioned it. Fluted intramedullary alignment jarod applied and using the depth gauge and the cutting block on the tibia.  Proceeded with multiple bent Homans protecting the collateral ligaments and posterior neurovascular structures and using the oscillating saw cut the tibia.  Excess osteophytes removed. Measured the tibia, marked the rotation on the base plate , pinned in place and punched our Amita.  Trials placed into the knee and put the knee through range of motion checking gap in extension , flexion at 45° of flexion.  Come from the sizes.    The knee was pulse lavaged then was injected in the surrounding soft tissues for postop pain control.  Prosthesis was cemented in place and excess cement was removed. Once cement has hardened the knee was placed through range of motion confirming stability. Once prosthesis was checked the tourniquet was deflated. Closure of the knee performed with 1.  Vicryl and figure-of-eight and running fashion. Subcutaneous tissues were irrigated and then closed using 1.  Vicryl  inverted stitch and skin using staple gun.  Sterile dressing applied.

## 2021-12-29 NOTE — DISCHARGE SUMMARY
"[facility] - Surgery (Hospital)  Discharge Note  Short Stay    Procedure(s) (LRB):  ARTHROPLASTY, KNEE, TOTAL (Left)    OUTCOME: Patient tolerated treatment/procedure well without complication and is now ready for discharge.    [place] [facility] [place]AL DIAGNOSIS:  <principal problem not specified>  Arthritis of left knee/posttraumatic  FOLLOWUP: In clinic    DISCHARGE INSTRUCTIONS:    Discharge Procedure Orders   WALKER FOR HOME USE     Order Specific Question Answer Comments   Type of Walker: Adult (5'4"-6'6")    With wheels? Yes    Height: 5' 7" (1.702 m)    Weight: 273    Length of need (1-99 months): 3    Please check all that apply: Patient is unable to safely ambulate without equipment.      Referral to Home health   Referral Priority: Routine Referral Type: Home Health Care   Referral Reason: Specialty Services Required   Requested Specialty: Home Health Services   Number of Visits Requested: 1     Diet general     Remove dressing in 72 hours   Order Comments: Clean with betadine, apply 4x4 and tape     Sponge bath only until clinic visit     Ice to affected area   Order Comments: using barrier between ice and skin (specify duration&frequency)     Call MD for:  redness, tenderness, or signs of infection (pain, swelling, redness, odor or green/yellow discharge around incision site)     Activity as tolerated     Weight bearing as tolerated        TIME SPENT ON DISCHARGE: 25 minutes  "

## 2022-01-12 ENCOUNTER — TELEPHONE (OUTPATIENT)
Dept: ORTHOPEDICS | Facility: CLINIC | Age: 67
End: 2022-01-12
Payer: OTHER GOVERNMENT

## 2022-01-12 DIAGNOSIS — M17.32 POST-TRAUMATIC OSTEOARTHRITIS OF LEFT KNEE: Primary | ICD-10-CM

## 2022-01-12 NOTE — TELEPHONE ENCOUNTER
----- Message from Yamileth Soto MA sent at 1/11/2022  3:12 PM CST -----  Contact: [phone]  Order for Outpatient Physical Therapy send to [facility] [employer]. Patient is Work Comp.  ----- Message -----  From: Isaak Gutierrez  Sent: 1/11/2022  12:50 PM CST  To: [facility] Staff    Patient is calling in requesting a call back regarding her therapy. Please call her back at [phone]  Thanks/ln

## 2022-01-13 ENCOUNTER — OFFICE VISIT (OUTPATIENT)
Dept: ORTHOPEDICS | Facility: CLINIC | Age: 67
End: 2022-01-13
Payer: OTHER GOVERNMENT

## 2022-01-13 VITALS
DIASTOLIC BLOOD PRESSURE: 87 MMHG | SYSTOLIC BLOOD PRESSURE: 132 MMHG | WEIGHT: 266.56 LBS | BODY MASS INDEX: 41.84 KG/M2 | HEIGHT: 67 IN | TEMPERATURE: 98 F | HEART RATE: 114 BPM

## 2022-01-13 DIAGNOSIS — Z96.652 STATUS POST TOTAL LEFT KNEE REPLACEMENT USING CEMENT: Primary | ICD-10-CM

## 2022-01-13 PROCEDURE — 99024 PR POST-OP FOLLOW-UP VISIT: ICD-10-PCS | Mod: S$GLB,,, | Performed by: PHYSICIAN ASSISTANT

## 2022-01-13 PROCEDURE — 99024 POSTOP FOLLOW-UP VISIT: CPT | Mod: S$GLB,,, | Performed by: PHYSICIAN ASSISTANT

## 2022-01-13 PROCEDURE — 99999 PR PBB SHADOW E&M-EST. PATIENT-LVL IV: CPT | Mod: PBBFAC,,, | Performed by: PHYSICIAN ASSISTANT

## 2022-01-13 PROCEDURE — 99999 PR PBB SHADOW E&M-EST. PATIENT-LVL IV: ICD-10-PCS | Mod: PBBFAC,,, | Performed by: PHYSICIAN ASSISTANT

## 2022-01-13 NOTE — PROGRESS NOTES
Patient ID: Madhuri Hickman is a 66 y.o. female.    Chief Complaint: Post-op Evaluation and Pain of the Left Knee      HPI: Madhuri Hickman  is a 66 y.o. female who c/o Post-op Evaluation and Pain of the Left Knee     Post op visit 1   Patient notes pain is 7/10  Patient notes pain is increased with use and activity and therapy   Taking her pain medication but she ran out and has been using OTC meds as best she can   Elevation and ice as need as well     Equipment walker as needed   DVT compliant  Therapy compliant     Patient is presently denying any shortness of breath, chest pain, fever/chills, nausea/vomiting, loss of taste or smell, numbness/tingling or sensation changes, loss of bladder or bowel function, loss of taste/smell.     Surgery: Left Total Knee    Surgery Date:  12/29/2021    Past Medical History:   Diagnosis Date    Anxiety     Arthritis     left knee    Encounter for general adult medical examination without abnormal findings 05/09/2017    Hypertension     Vitamin D deficiency      Past Surgical History:   Procedure Laterality Date    HYSTERECTOMY      total    TOTAL KNEE ARTHROPLASTY Left 12/29/2021    Procedure: ARTHROPLASTY, KNEE, TOTAL;  Surgeon: Alf Santana MD;  [facility];  Service: Orthopedics;  Laterality: Left;    VAGINAL DELIVERY       Family History   Problem Relation Age of Onset    Diabetes Mother     Hypertension Mother     Heart disease Father     Hypertension Sister     Heart disease Brother     Diabetes Brother     Cancer Maternal Grandmother         breast     Social History     Socioeconomic History    Marital status:    Tobacco Use    Smoking status: Never Smoker    Smokeless tobacco: Never Used   Substance and Sexual Activity    Alcohol use: Yes     Comment: social; hold 72hrs prior to surgery    Drug use: No    Sexual activity: Not Currently     Medication List with Changes/Refills   Current Medications     AMLODIPINE-BENAZEPRIL 10-20MG (LOTREL) 10-20 MG PER CAPSULE    Take 1 capsule by mouth once daily.    ASPIRIN 81 MG CHEW    Take 81 mg by mouth once daily.    CHLORTHALIDONE (HYGROTEN) 25 MG TAB    Take 1 tablet (25 mg total) by mouth once daily.    ERGOCALCIFEROL (ERGOCALCIFEROL) 50,000 UNIT CAP    Take 1 capsule (50,000 Units total) by mouth every 7 days.    GABAPENTIN (NEURONTIN) 300 MG CAPSULE    Take 1 capsule (300 mg total) by mouth every evening.    MELOXICAM (MOBIC) 15 MG TABLET    Take 1 tablet (15 mg total) by mouth once daily. Take with food    METFORMIN (GLUCOPHAGE-XR) 500 MG ER 24HR TABLET    Take 2 tablets (1,000 mg total) by mouth once daily.    ONDANSETRON (ZOFRAN-ODT) 4 MG TBDL    Dissolve 2 tablets (8 mg total) by mouth every 8 (eight) hours as needed.    OXYCODONE-ACETAMINOPHEN (PERCOCET)  MG PER TABLET    Take 1 tablet by mouth every 6 (six) hours as needed for Pain.    OXYCODONE-ACETAMINOPHEN (PERCOCET)  MG PER TABLET    Take 1 tablet by mouth every 6 (six) hours as needed for Pain.     Review of patient's allergies indicates:  No Known Allergies    Objective:     Left Lower Extremity  NVI  WWP foot  Comp soft  Cap refill < 2 sec  Calf NT  (-) Rodrigo sign  VASYL  ROM : Patient is able to easily exhibit full flexion and extension on passive range of motion.   Wiggles toes  DF/PF intact  Sensation intact  Inc C/D/I  No SOI    Assessment:       Encounter Diagnosis   Name Primary?    Status post total left knee replacement using cement Yes          Plan:       Madhuri was seen today for post-op evaluation and pain.    Diagnoses and all orders for this visit:    Status post total left knee replacement using cement        Madhuri Hickman is an established pt here for postop follow-up after left total knee replacement by Dr. Santana.  Pain medication was refilled appropriately. The incision was cleaned with hydrogen peroxide.  All staples were removed, and suture strips were applied  across the incision.  They should remain in place until they fall off in approximately 1-2 weeks. The patient was instructed not to soak the incision in standing water but may clean the incision with clean running water and antibacterial soap.  Patient should notify the office of any signs or symptoms of infection including fevers, erythema, purulent drainage, increasing pain.  Patient will continue with DVT prophylaxis.  Patient will start outpatient physical therapy.  Will follow-up in 2 weeks.  Patient verbalized understanding of all instructions and agreed with the above plan.    No follow-ups on file.    The patient understands, chooses and consents to this plan and accepts all   the risks which include but are not limited to the risks mentioned above.     Disclaimer: This note was prepared using a voice recognition system and is likely to have sound alike errors within the text.

## 2022-01-18 VITALS
HEART RATE: 80 BPM | TEMPERATURE: 98 F | DIASTOLIC BLOOD PRESSURE: 78 MMHG | WEIGHT: 266.63 LBS | HEIGHT: 67 IN | OXYGEN SATURATION: 99 % | RESPIRATION RATE: 18 BRPM | BODY MASS INDEX: 41.85 KG/M2 | SYSTOLIC BLOOD PRESSURE: 155 MMHG

## 2022-01-18 DIAGNOSIS — Z96.652 STATUS POST TOTAL LEFT KNEE REPLACEMENT USING CEMENT: Primary | ICD-10-CM

## 2022-01-18 DIAGNOSIS — Z96.652 STATUS POST TOTAL LEFT KNEE REPLACEMENT USING CEMENT: ICD-10-CM

## 2022-01-18 RX ORDER — OXYCODONE AND ACETAMINOPHEN 10; 325 MG/1; MG/1
1 TABLET ORAL EVERY 8 HOURS PRN
Qty: 21 TABLET | Refills: 0 | Status: SHIPPED | OUTPATIENT
Start: 2022-01-18 | End: 2022-01-18 | Stop reason: SDUPTHER

## 2022-01-18 RX ORDER — OXYCODONE AND ACETAMINOPHEN 10; 325 MG/1; MG/1
1 TABLET ORAL EVERY 8 HOURS PRN
Qty: 21 TABLET | Refills: 0 | Status: SHIPPED | OUTPATIENT
Start: 2022-01-18 | End: 2022-03-17

## 2022-01-18 NOTE — TELEPHONE ENCOUNTER
----- Message from Gabby Maya sent at 1/18/2022 10:25 AM CST -----  Regarding: Pain Medication Request  Contact: Patient  Type:  RX Refill Request    Who Called: Madhuri Hickman   Refill or New Rx:refill  RX Name and Strength:Pain Medication   How is the patient currently taking it? (ex. 1XDay):  Is this a 30 day or 90 day RX:  Preferred Pharmacy with phone number:  [facility]pharmacy #[id number] [address] [facility]  [address] [facility] [address] [facility] [address]  Phone: [phone] Fax: [phone]  Local or Mail Order:local  Ordering Provider:Dr. Carlisle  Would the patient rather a call back or a response via [facility]? call  Best Call Back Number:[phone]  Additional Information: Patient preciously requested medication and never received medication, she had previously spoke to Nurse Practitioner

## 2022-02-11 ENCOUNTER — DOCUMENT SCAN (OUTPATIENT)
Dept: HOME HEALTH SERVICES | Facility: HOSPITAL | Age: 67
End: 2022-02-11
Payer: OTHER GOVERNMENT

## 2022-02-14 ENCOUNTER — HOSPITAL ENCOUNTER (OUTPATIENT)
Dept: RADIOLOGY | Facility: HOSPITAL | Age: 67
Discharge: HOME | End: 2022-02-14
Attending: ORTHOPAEDIC SURGERY
Payer: OTHER GOVERNMENT

## 2022-02-14 ENCOUNTER — DOCUMENT SCAN (OUTPATIENT)
Dept: HOME HEALTH SERVICES | Facility: HOSPITAL | Age: 67
End: 2022-02-14
Payer: OTHER GOVERNMENT

## 2022-02-14 ENCOUNTER — OFFICE VISIT (OUTPATIENT)
Dept: ORTHOPEDICS | Facility: CLINIC | Age: 67
End: 2022-02-14
Payer: OTHER GOVERNMENT

## 2022-02-14 VITALS — WEIGHT: 261.25 LBS | BODY MASS INDEX: 41 KG/M2 | HEIGHT: 67 IN

## 2022-02-14 DIAGNOSIS — E66.9 OBESITY, UNSPECIFIED CLASSIFICATION, UNSPECIFIED OBESITY TYPE, UNSPECIFIED WHETHER SERIOUS COMORBIDITY PRESENT: ICD-10-CM

## 2022-02-14 DIAGNOSIS — M21.061 ACQUIRED VALGUS DEFORMITY KNEE, RIGHT: ICD-10-CM

## 2022-02-14 DIAGNOSIS — M17.11 ARTHRITIS OF KNEE, RIGHT: ICD-10-CM

## 2022-02-14 DIAGNOSIS — Z96.652 STATUS POST TOTAL LEFT KNEE REPLACEMENT USING CEMENT: Primary | ICD-10-CM

## 2022-02-14 DIAGNOSIS — Z01.818 PREOP TESTING: ICD-10-CM

## 2022-02-14 PROCEDURE — 99024 POSTOP FOLLOW-UP VISIT: CPT | Mod: S$GLB,,, | Performed by: ORTHOPAEDIC SURGERY

## 2022-02-14 PROCEDURE — 73564 XR KNEE ORTHO LEFT WITH FLEXION: ICD-10-PCS | Mod: 26,LT,, | Performed by: RADIOLOGY

## 2022-02-14 PROCEDURE — 73564 X-RAY EXAM KNEE 4 OR MORE: CPT | Mod: 26,LT,, | Performed by: RADIOLOGY

## 2022-02-14 PROCEDURE — 99024 PR POST-OP FOLLOW-UP VISIT: ICD-10-PCS | Mod: S$GLB,,, | Performed by: ORTHOPAEDIC SURGERY

## 2022-02-14 PROCEDURE — 99999 PR PBB SHADOW E&M-EST. PATIENT-LVL III: ICD-10-PCS | Mod: PBBFAC,,, | Performed by: ORTHOPAEDIC SURGERY

## 2022-02-14 PROCEDURE — 73562 X-RAY EXAM OF KNEE 3: CPT | Mod: 26,RT,, | Performed by: RADIOLOGY

## 2022-02-14 PROCEDURE — 99999 PR PBB SHADOW E&M-EST. PATIENT-LVL III: CPT | Mod: PBBFAC,,, | Performed by: ORTHOPAEDIC SURGERY

## 2022-02-14 PROCEDURE — 73562 X-RAY EXAM OF KNEE 3: CPT | Mod: TC,RT

## 2022-02-14 PROCEDURE — 73562 XR KNEE ORTHO LEFT WITH FLEXION: ICD-10-PCS | Mod: 26,RT,, | Performed by: RADIOLOGY

## 2022-02-14 RX ORDER — GABAPENTIN 300 MG/1
300 CAPSULE ORAL 2 TIMES DAILY
Qty: 60 CAPSULE | Refills: 11 | Status: SHIPPED | OUTPATIENT
Start: 2022-02-14 | End: 2023-03-16

## 2022-02-14 NOTE — PATIENT INSTRUCTIONS
X-ray left total knee replacement in excellent alignment.  You kneecap is in the middle there is no thing broken  Continue physical therapy at [facility]  I will prescribe you the gabapentin since urine out which will help with nerve pain you take 1 at night  Continue with Tylenol as needed  Continue with the pain medication only if needed  Ice the knee  You never received your tub bench we will see what we can do  Would like to see you back in 6 weeks

## 2022-02-14 NOTE — PROGRESS NOTES
Subjective:     Patient ID: Madhuri Hickman is a 66 y.o. female.    Chief Complaint: Pain of the Left Knee   Mild right knee pain  HPI:  09/21/2020  64-year-old  who injured her knee approximately 16 years ago at work wears a wooden steps broke while going up.  She was eventually return to work with restrictions.  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  She did not hit the ground she held onto the truck.  She stated ibuprofen 800 mg helps but she ran out she really requesting renewal.  She has not received any injections over the last several years.  She has occasional catching locking and giving way.  Pain is 6/10 that is constant worst with stairs if she tries to go up.  Unable to squat or kneel.  Unable to stand or walk too long.  She is not using any assistive devices to ambulate.  Denies any fever or chills or shortness of breath or difficulty with chewing or swallowing loss of bowel bladder control or blurry vision or double vision or numbness or tingling in the legs or hands    11/09/2020  Steroid injection into the left knee as well as ibuprofen seems to have helped given on 09/21.  The story goes that she slipped getting out of her mail truck and slipped and grass.  She does have pre-existing arthritis in her left knee and this fall aggravated it.  She states the physical therapy is helping the injection helped the ibuprofen is helping.  Still afraid L catching locking.  Her pain is 5/10  Denies any fever or chills or shortness of breath or difficulty with chewing or swallowing loss of bowel bladder control.      01/07/2021  Patient arrived 21 min late for her appointment  Arthritis of both knees.  Posttraumatic left knee arthrosis.  She was instructed to take ibuprofen and Tylenol as needed.  Received steroid injection on 09/21/2020 with good relief of her pain  Still going to physical therapy at [facility].  She is doing slightly better now she can get out  of the tub instead of showering.  Walking 100 ft become severely painful has to sit down.  Still doing her dishes sitting down.  She is questioning my diagnosis at this point since her work says do not except arthritis as a condition.  I did tell her her injury worsened what she has her arthritis and considered posttraumatic arthritis of the knee.  This is a medical condition and has a code.  Since she is improving on ibuprofen and physical therapy and Tylenol no need to perform any injection.  Pain is 6/10    Denies any fever or chills or shortness of breath or difficulty with chewing or swallowing loss of bowel bladder control blurry vision double vision    02/26/2021  Left knee severe posttraumatic arthritis and right knee mild to moderate arthritis.  Numerous treatment including physical therapy, steroid injection 09/21/2020, hyaluronic injection 01/07/2021 and different NSAIDs did not seem to make be used difference just mild assistant.  She is using occasionally her cane to get around.  She is at a point she says she wants to proceed with her total knee replacement.  The ibuprofen does not seem to work as well.  She feels sometimes Tylenol work better.  Activities of daily living seems to be difficult even washing her dishes at this point seems to be impossible.    Denies any fever or chills or shortness of breath or difficulty with chewing or swallowing loss of bowel bladder control blurry vision double vision or loss of sense smell or taste    05/20/2021  Left knee severe posttraumatic arthritis.  Patient has denied total knee replacement.  She is taking gabapentin and meloxicam.  At this time she is not working.  Today nurse from worker"[employer]s comp is with her and patient agreed for her to sit and discuss.  Nurse's name Angela Camejo .  We did discuss and showed x-ray.  Discussed treatment plan which included injections of steroid different anti-inflammatories physical therapy, gabapentin..  So far she still  under review.  It eventually if that  gets denied need to go for independent medical examination  No fever no chills no shortness of breath or difficulty with chewing or swallowing loss of bowel bladder control blurry vision double vision loss sense smell or taste      07/26/2021  Left knee posttraumatic arthritis.  Things are getting worst since last time we have seen her.  Now it is locking on her in the middle of the night unable to get any relief.  She is still taking the gabapentin occasionally taking the meloxicam.  Her pain is 8/10.  Unable to stand for any .  More than 10 minutes.  She is having catching locking feeling in the knee despite not doing anything in the middle of the night also.  Unable to do steps.  Walking from the parking lot to the office was very difficult.  Using a cane to get around.  States she was scared that her knee locked on her in the middle of the night and could not do anything for it until slowly unlocked itself by the next day.  These episodes are becoming more frequent than normal.  Patient had been through anti-inflammatories, gabapentin, steroid injections, viscosupplementation injections with very mild and minimal relief.  No fever no chills no shortness of breath or difficulty with chewing or swallowing or loss of bowel bladder control blurry vision double vision loss sense smell or taste    02/14/2022  Left total knee replacement 12/29/2021.  Patient going to [facility] physical therapy in [place] but no true she.  She is taking Tylenol.  She ran out of her gabapentin and was not too sure if she needs to continue with it.  Her right knee with severe arthritis hurts her more than the left.  She is pleased with the results so far on the left knee and ambulating without any assistive devices.  She stated she was supposed to get a bath tub bench because of her tub and she needs assistance to get inside the tub and get out and never received it..  She complains occasional numbness in  the toes and on the outside of her knee.  I refreshed her memory that I did tell her that almost all total knees are numb on the outside of the knees and with time she will forget about it.  And as far as the numbness in the toes I did tell her that this is usually secondary to her back.  Pain is 5/10  Past Medical History:   Diagnosis Date    Anxiety     Arthritis     left knee    Encounter for general adult medical examination without abnormal findings 05/09/2017    Hypertension     Vitamin D deficiency      Past Surgical History:   Procedure Laterality Date    HYSTERECTOMY      total    TOTAL KNEE ARTHROPLASTY Left 12/29/2021    Procedure: ARTHROPLASTY, KNEE, TOTAL;  Surgeon: Alf Santana MD;  Location: [facility];  Service: Orthopedics;  Laterality: Left;    VAGINAL DELIVERY       Family History   Problem Relation Age of Onset    Diabetes Mother     Hypertension Mother     Heart disease Father     Hypertension Sister     Heart disease Brother     Diabetes Brother     Cancer Maternal Grandmother         breast     Social History     Socioeconomic History    Marital status:    Tobacco Use    Smoking status: Never Smoker    Smokeless tobacco: Never Used   Substance and Sexual Activity    Alcohol use: Yes     Comment: social; hold 72hrs prior to surgery    Drug use: No    Sexual activity: Not Currently     Medication List with Changes/Refills   New Medications    GABAPENTIN (NEURONTIN) 300 MG CAPSULE    Take 1 capsule (300 mg total) by mouth 2 (two) times daily.   Current Medications    AMLODIPINE-BENAZEPRIL 10-20MG (LOTREL) 10-20 MG PER CAPSULE    Take 1 capsule by mouth once daily.    ASPIRIN 81 MG CHEW    Take 81 mg by mouth once daily.    CHLORTHALIDONE (HYGROTEN) 25 MG TAB    Take 1 tablet (25 mg total) by mouth once daily.    ERGOCALCIFEROL (ERGOCALCIFEROL) 50,000 UNIT CAP    Take 1 capsule (50,000 Units total) by mouth every 7 days.    GABAPENTIN (NEURONTIN) 300 MG CAPSULE     Take 1 capsule (300 mg total) by mouth every evening.    MELOXICAM (MOBIC) 15 MG TABLET    Take 1 tablet (15 mg total) by mouth once daily. Take with food    METFORMIN (GLUCOPHAGE-XR) 500 MG ER 24HR TABLET    Take 2 tablets (1,000 mg total) by mouth once daily.    ONDANSETRON (ZOFRAN-ODT) 4 MG TBDL    Dissolve 2 tablets (8 mg total) by mouth every 8 (eight) hours as needed.    OXYCODONE-ACETAMINOPHEN (PERCOCET)  MG PER TABLET    Take 1 tablet by mouth every 8 (eight) hours as needed for Pain.     Review of patient's allergies indicates:  No Known Allergies  Review of Systems   Constitutional: Negative for decreased appetite.   HENT: Negative for tinnitus.    Eyes: Negative for double vision.   Cardiovascular: Negative for chest pain.   Respiratory: Negative for wheezing.    Hematologic/Lymphatic: Negative for bleeding problem.   Skin: Negative for dry skin.   Musculoskeletal: Positive for arthritis, joint pain and stiffness. Negative for back pain, falls, gout, joint swelling and neck pain.   Gastrointestinal: Negative for abdominal pain.   Genitourinary: Negative for bladder incontinence.   Neurological: Negative for numbness, paresthesias and sensory change.   Psychiatric/Behavioral: Negative for altered mental status.       Objective:   Body mass index is 40.92 kg/m².  There were no vitals filed for this visit.       General    Constitutional: She is oriented to person, place, and time. She appears well-developed.   HENT:   Head: Atraumatic.   Eyes: EOM are normal.   Cardiovascular: Normal rate.    Pulmonary/Chest: Effort normal.   Neurological: She is alert and oriented to person, place, and time.   Psychiatric: Judgment normal.                Pelvis is level  Bilateral hips passive motion without pain or limitations.  There is no pain to palpation over the trochanters.  Hip flexors, abductors, adductors, quads, hamstrings, ankle extensors and flexors inverters and everters all 5/5  Left knee surgical  incision healed well there is no drainage.  There is no warmness to touch.  Active motion 0-115 degrees.  Stable in extension and flexion.  Slight decrease in sensation in the lateral aspect of the knee joint.  Able to move her ankle up and down  Right knee mild medial joint pain.  No swelling.  Motion 0-130 degrees.  Collaterals and cruciates are stable.  Negative Bienvenido sign  Bilateral calves are soft nontender  Slight swelling around the ankle  DP 1+  PT 1+  Skin is warm to touch no obvious lesions.  Sensory intact to touch    Relevant imaging results reviewed and interpreted by me, discussed with the patient and / or family today   X-ray 02/14/2022 left TKA in excellent alignment.  No evidence of fracture.  Patella midline.  Right knee with complete loss lateral joint space without fracture  X-ray 12/29/2021 left TKA without evidence of fracture.  Do not have true AP and lateral views.  Staples intact  Personally reviewed the x-rays of both knees with the patient agree with the above  X-ray 09/03/2020 bilateral knees with left knee severe loss of medial joint space and osteophytic changes anteriorly and as well as laterally.  Also the right knee has some arthritic changes with not as bad loss of joint space as the left  Assessment:     Encounter Diagnoses   Name Primary?    Arthritis of knee, right     Acquired valgus deformity knee, right     Status post total left knee replacement using cement Yes    Obesity, unspecified classification, unspecified obesity type, unspecified whether serious comorbidity present         Plan:   Status post total left knee replacement using cement  -     gabapentin (NEURONTIN) 300 MG capsule; Take 1 capsule (300 mg total) by mouth 2 (two) times daily.  Dispense: 60 capsule; Refill: 11    Arthritis of knee, right    Acquired valgus deformity knee, right    Obesity, unspecified classification, unspecified obesity type, unspecified whether serious comorbidity present          Patient Instructions   X-ray left total knee replacement in excellent alignment.  You kneecap is in the middle there is no thing broken  Continue physical therapy at [facility]  I will prescribe you the gabapentin since urine out which will help with nerve pain you take 1 at night  Continue with Tylenol as needed  Continue with the pain medication only if needed  Ice the knee  You never received your tub bench we will see what we can do  Would like to see you back in 6 weeks       Patient at this time cannot perform any duties due to the severity of the pain into worsening of her condition.  It I cannot have her return to work at any capacity at this time.  She needs to have her total knee replacement with recuperating period of 3 months postop prior to returning to her job.  Total knee replacements are around 78-80% successful in decreasing pain increasing function the not 100% either.  Disclaimer: This note was prepared using a voice recognition system and is likely to have sound alike errors within the text.

## 2022-03-28 ENCOUNTER — OFFICE VISIT (OUTPATIENT)
Dept: ORTHOPEDICS | Facility: CLINIC | Age: 67
End: 2022-03-28
Payer: OTHER GOVERNMENT

## 2022-03-28 VITALS — BODY MASS INDEX: 41.12 KG/M2 | HEIGHT: 67 IN | WEIGHT: 262 LBS

## 2022-03-28 DIAGNOSIS — M17.11 ARTHRITIS OF KNEE, RIGHT: ICD-10-CM

## 2022-03-28 DIAGNOSIS — E66.01 MORBID OBESITY WITH BMI OF 40.0-44.9, ADULT: ICD-10-CM

## 2022-03-28 DIAGNOSIS — Z96.652 STATUS POST TOTAL LEFT KNEE REPLACEMENT USING CEMENT: Primary | ICD-10-CM

## 2022-03-28 DIAGNOSIS — M21.061 ACQUIRED VALGUS DEFORMITY KNEE, RIGHT: ICD-10-CM

## 2022-03-28 PROCEDURE — 99213 OFFICE O/P EST LOW 20 MIN: CPT | Mod: PBBFAC | Performed by: ORTHOPAEDIC SURGERY

## 2022-03-28 PROCEDURE — 99999 PR PBB SHADOW E&M-EST. PATIENT-LVL III: CPT | Mod: PBBFAC,,, | Performed by: ORTHOPAEDIC SURGERY

## 2022-03-28 PROCEDURE — 99024 POSTOP FOLLOW-UP VISIT: CPT | Mod: ,,, | Performed by: ORTHOPAEDIC SURGERY

## 2022-03-28 PROCEDURE — 99024 PR POST-OP FOLLOW-UP VISIT: ICD-10-PCS | Mod: ,,, | Performed by: ORTHOPAEDIC SURGERY

## 2022-03-28 PROCEDURE — 99999 PR PBB SHADOW E&M-EST. PATIENT-LVL III: ICD-10-PCS | Mod: PBBFAC,,, | Performed by: ORTHOPAEDIC SURGERY

## 2022-03-28 NOTE — PROGRESS NOTES
Subjective:     Patient ID: Madhuri Hickman is a 66 y.o. female.    Chief Complaint: Post-op Evaluation of the Left Knee   Mild right knee pain  HPI:  09/21/2020  64-year-old  who injured her knee approximately 16 years ago at work wears a wooden steps broke while going up.  She was eventually return to work with restrictions.  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  She did not hit the ground she held onto the truck.  She stated ibuprofen 800 mg helps but she ran out she really requesting renewal.  She has not received any injections over the last several years.  She has occasional catching locking and giving way.  Pain is 6/10 that is constant worst with stairs if she tries to go up.  Unable to squat or kneel.  Unable to stand or walk too long.  She is not using any assistive devices to ambulate.  Denies any fever or chills or shortness of breath or difficulty with chewing or swallowing loss of bowel bladder control or blurry vision or double vision or numbness or tingling in the legs or hands    11/09/2020  Steroid injection into the left knee as well as ibuprofen seems to have helped given on 09/21.  The story goes that she slipped getting out of her mail truck and slipped and grass.  She does have pre-existing arthritis in her left knee and this fall aggravated it.  She states the physical therapy is helping the injection helped the ibuprofen is helping.  Still afraid L catching locking.  Her pain is 5/10  Denies any fever or chills or shortness of breath or difficulty with chewing or swallowing loss of bowel bladder control.      01/07/2021  Patient arrived 21 min late for her appointment  Arthritis of both knees.  Posttraumatic left knee arthrosis.  She was instructed to take ibuprofen and Tylenol as needed.  Received steroid injection on 09/21/2020 with good relief of her pain  Still going to physical therapy at [facility].  She is doing slightly better now  she can get out of the tub instead of showering.  Walking 100 ft become severely painful has to sit down.  Still doing her dishes sitting down.  She is questioning my diagnosis at this point since her work says do not except arthritis as a condition.  I did tell her her injury worsened what she has her arthritis and considered posttraumatic arthritis of the knee.  This is a medical condition and has a code.  Since she is improving on ibuprofen and physical therapy and Tylenol no need to perform any injection.  Pain is 6/10    Denies any fever or chills or shortness of breath or difficulty with chewing or swallowing loss of bowel bladder control blurry vision double vision    02/26/2021  Left knee severe posttraumatic arthritis and right knee mild to moderate arthritis.  Numerous treatment including physical therapy, steroid injection 09/21/2020, hyaluronic injection 01/07/2021 and different NSAIDs did not seem to make be used difference just mild assistant.  She is using occasionally her cane to get around.  She is at a point she says she wants to proceed with her total knee replacement.  The ibuprofen does not seem to work as well.  She feels sometimes Tylenol work better.  Activities of daily living seems to be difficult even washing her dishes at this point seems to be impossible.    Denies any fever or chills or shortness of breath or difficulty with chewing or swallowing loss of bowel bladder control blurry vision double vision or loss of sense smell or taste    05/20/2021  Left knee severe posttraumatic arthritis.  Patient has denied total knee replacement.  She is taking gabapentin and meloxicam.  At this time she is not working.  Today nurse from worker"[employer]s comp is with her and patient agreed for her to sit and discuss.  Nurse's name Angela Camejo .  We did discuss and showed x-ray.  Discussed treatment plan which included injections of steroid different anti-inflammatories physical therapy, gabapentin..  So  far she still under review.  It eventually if that  gets denied need to go for independent medical examination  No fever no chills no shortness of breath or difficulty with chewing or swallowing loss of bowel bladder control blurry vision double vision loss sense smell or taste      07/26/2021  Left knee posttraumatic arthritis.  Things are getting worst since last time we have seen her.  Now it is locking on her in the middle of the night unable to get any relief.  She is still taking the gabapentin occasionally taking the meloxicam.  Her pain is 8/10.  Unable to stand for any .  More than 10 minutes.  She is having catching locking feeling in the knee despite not doing anything in the middle of the night also.  Unable to do steps.  Walking from the parking lot to the office was very difficult.  Using a cane to get around.  States she was scared that her knee locked on her in the middle of the night and could not do anything for it until slowly unlocked itself by the next day.  These episodes are becoming more frequent than normal.  Patient had been through anti-inflammatories, gabapentin, steroid injections, viscosupplementation injections with very mild and minimal relief.  No fever no chills no shortness of breath or difficulty with chewing or swallowing or loss of bowel bladder control blurry vision double vision loss sense smell or taste    02/14/2022  Left total knee replacement 12/29/2021.  Patient going to [facility] physical therapy in [place] but no true she.  She is taking Tylenol.  She ran out of her gabapentin and was not too sure if she needs to continue with it.  Her right knee with severe arthritis hurts her more than the left.  She is pleased with the results so far on the left knee and ambulating without any assistive devices.  She stated she was supposed to get a bath tub bench because of her tub and she needs assistance to get inside the tub and get out and never received it..  She complains  occasional numbness in the toes and on the outside of her knee.  I refreshed her memory that I did tell her that almost all total knees are numb on the outside of the knees and with time she will forget about it.  And as far as the numbness in the toes I did tell her that this is usually secondary to her back.  Pain is 5/10    03/28/2022  Left TKA 12/29/2021/worker's comp  Patient never received approval to continue to go to physical therapy at [facility] or the [facility].  Patient stated the request was not sent to the worker's Comp person at [facility] to get the approval of the above.  Pain is 6/10.  She is able to ambulate without any assistive devices.  She said she is happy she had her surgery done.  She does not think she can return to work at this time.  She had lost time from not being able to go to physical therapy.  She does ambulate without any assistive devices.    Right knee she is having pain in that but that is a different worker's comp number and we will not evaluate today  Past Medical History:   Diagnosis Date    Anxiety     Arthritis     left knee    Encounter for general adult medical examination without abnormal findings 05/09/2017    Hypertension     Vitamin D deficiency      Past Surgical History:   Procedure Laterality Date    HYSTERECTOMY      total    TOTAL KNEE ARTHROPLASTY Left 12/29/2021    Procedure: ARTHROPLASTY, KNEE, TOTAL;  Surgeon: Alf Santana MD;  Location: [facility];  Service: Orthopedics;  Laterality: Left;    VAGINAL DELIVERY       Family History   Problem Relation Age of Onset    Diabetes Mother     Hypertension Mother     Heart disease Father     Hypertension Sister     Heart disease Brother     Diabetes Brother     Cancer Maternal Grandmother         breast     Social History     Socioeconomic History    Marital status:    Tobacco Use    Smoking status: Never Smoker    Smokeless tobacco: Never Used   Substance and Sexual Activity    Alcohol use:  Yes     Comment: social; hold 72hrs prior to surgery    Drug use: No    Sexual activity: Not Currently     Medication List with Changes/Refills   Current Medications    AMLODIPINE-BENAZEPRIL 10-20MG (LOTREL) 10-20 MG PER CAPSULE    Take 1 capsule by mouth once daily.    ASPIRIN 81 MG CHEW    Take 81 mg by mouth once daily.    CHLORTHALIDONE (HYGROTEN) 25 MG TAB    Take 1 tablet (25 mg total) by mouth once daily.    ERGOCALCIFEROL (ERGOCALCIFEROL) 50,000 UNIT CAP    Take 1 capsule (50,000 Units total) by mouth every 7 days.    GABAPENTIN (NEURONTIN) 300 MG CAPSULE    Take 1 capsule (300 mg total) by mouth every evening.    GABAPENTIN (NEURONTIN) 300 MG CAPSULE    Take 1 capsule (300 mg total) by mouth 2 (two) times daily.    MELOXICAM (MOBIC) 15 MG TABLET    Take 1 tablet (15 mg total) by mouth once daily. Take with food    ONDANSETRON (ZOFRAN-ODT) 4 MG TBDL    Dissolve 2 tablets (8 mg total) by mouth every 8 (eight) hours as needed.    SITAGLIPTAN-METFORMIN (JANUMET XR) 100-1,000 MG TM24    Take 1 tablet by mouth once daily.     Review of patient's allergies indicates:  No Known Allergies  Review of Systems   Constitutional: Negative for decreased appetite.   HENT: Negative for tinnitus.    Eyes: Negative for double vision.   Cardiovascular: Negative for chest pain.   Respiratory: Negative for wheezing.    Hematologic/Lymphatic: Negative for bleeding problem.   Skin: Negative for dry skin.   Musculoskeletal: Positive for arthritis, joint pain and stiffness. Negative for back pain, falls, gout, joint swelling and neck pain.   Gastrointestinal: Negative for abdominal pain.   Genitourinary: Negative for bladder incontinence.   Neurological: Negative for numbness, paresthesias and sensory change.   Psychiatric/Behavioral: Negative for altered mental status.       Objective:   Body mass index is 41.04 kg/m².  There were no vitals filed for this visit.       General    Constitutional: She is oriented to person, place,  and time. She appears well-developed.   HENT:   Head: Atraumatic.   Eyes: EOM are normal.   Cardiovascular: Normal rate.    Pulmonary/Chest: Effort normal.   Neurological: She is alert and oriented to person, place, and time.   Psychiatric: Judgment normal.              Ambulating without any assistive devices  Pelvis is level  Bilateral hips passive motion without pain or limitations.  There is no pain to palpation over the trochanters.  Hip flexors, abductors, adductors, quads, hamstrings, ankle extensors and flexors inverters and everters all 5/5  Left knee surgical incision healed well there is no drainage.  There is no warmness to touch.  Active motion 0-125 degrees.  Stable in extension and flexion.  Slight decrease in sensation in the lateral aspect of the knee joint.  There is no defect in the patella or quadriceps tendon    Able to move her ankle up and down  Right knee mild medial joint pain/moderate lateral joint pain.  No swelling.  Motion 0-130 degrees.  Collaterals and cruciates are stable.  Negative Bienvenido sign.  No defect in the patella or quadriceps tendon  Bilateral calves are soft nontender  Slight swelling around the ankle  DP 1+  PT 1+  Skin is warm to touch no obvious lesions.  Sensory intact to touch    Relevant imaging results reviewed and interpreted by me, discussed with the patient and / or family today   X-ray 02/14/2022 left TKA in excellent alignment.  No evidence of fracture.  Patella midline.  Right knee with complete loss lateral joint space without fracture on AP with flexion  X-ray 12/29/2021 left TKA without evidence of fracture.  Do not have true AP and lateral views.  Staples intact  Personally reviewed the x-rays of both knees with the patient agree with the above  X-ray 09/03/2020 bilateral knees with left knee severe loss of medial joint space and osteophytic changes anteriorly and as well as laterally.  Also the right knee has some arthritic changes with not as bad loss of  joint space as the left  Assessment:     Encounter Diagnoses   Name Primary?    Status post total left knee replacement using cement Yes    Arthritis of knee, right     Acquired valgus deformity knee, right     Morbid obesity with BMI of 40.0-44.9, adult         Plan:   Status post total left knee replacement using cement    Arthritis of knee, right    Acquired valgus deformity knee, right    Morbid obesity with BMI of 40.0-44.9, adult         Patient Instructions   Patient never received approval for continuation of her physical therapy at [facility] .  We will submit again to worker's comp in order to approve and resume physical therapy  We will also submit an to worker's comp the need for tub bench  I did review x-rays from February 2022 showing the left total knee replacement in good alignment however the right knee with slight flexion has complete collapse on the outside of the knee.  In the future you probably would end up needing right total knee replacement.  I would like you to come back to see us in around 6-8 weeks    And thing the patient right now it is ready to return to work.  She needs to gain some more time with physical therapy which she had lost since last visit    Disclaimer: This note was prepared using a voice recognition system and is likely to have sound alike errors within the text.

## 2022-03-28 NOTE — PATIENT INSTRUCTIONS
Patient never received approval for continuation of her physical therapy at [facility] .  We will submit again to worker's comp in order to approve and resume physical therapy  We will also submit an to worker's comp the need for tub bench  I did review x-rays from February 2022 showing the left total knee replacement in good alignment however the right knee with slight flexion has complete collapse on the outside of the knee.  In the future you probably would end up needing right total knee replacement.  I would like you to come back to see us in around 6-8 weeks

## 2022-06-17 ENCOUNTER — OFFICE VISIT (OUTPATIENT)
Dept: ORTHOPEDICS | Facility: CLINIC | Age: 67
End: 2022-06-17
Payer: OTHER GOVERNMENT

## 2022-06-17 VITALS — BODY MASS INDEX: 41.11 KG/M2 | HEIGHT: 67 IN | WEIGHT: 261.94 LBS

## 2022-06-17 DIAGNOSIS — M21.061 ACQUIRED VALGUS DEFORMITY KNEE, RIGHT: ICD-10-CM

## 2022-06-17 DIAGNOSIS — Z96.652 HISTORY OF TOTAL LEFT KNEE REPLACEMENT: Primary | ICD-10-CM

## 2022-06-17 DIAGNOSIS — M17.11 ARTHRITIS OF KNEE, RIGHT: ICD-10-CM

## 2022-06-17 DIAGNOSIS — E66.01 MORBID OBESITY WITH BMI OF 40.0-44.9, ADULT: ICD-10-CM

## 2022-06-17 PROCEDURE — 99213 PR OFFICE/OUTPT VISIT, EST, LEVL III, 20-29 MIN: ICD-10-PCS | Mod: S$GLB,,, | Performed by: ORTHOPAEDIC SURGERY

## 2022-06-17 PROCEDURE — 99213 OFFICE O/P EST LOW 20 MIN: CPT | Mod: S$GLB,,, | Performed by: ORTHOPAEDIC SURGERY

## 2022-06-17 PROCEDURE — 99999 PR PBB SHADOW E&M-EST. PATIENT-LVL III: CPT | Mod: PBBFAC,,, | Performed by: ORTHOPAEDIC SURGERY

## 2022-06-17 PROCEDURE — 99999 PR PBB SHADOW E&M-EST. PATIENT-LVL III: ICD-10-PCS | Mod: PBBFAC,,, | Performed by: ORTHOPAEDIC SURGERY

## 2022-06-17 NOTE — PROGRESS NOTES
Subjective:     Patient ID: Madhrui Hickman is a 66 y.o. female.    Chief Complaint: Post-op Evaluation of the Left Knee   Mild right knee pain  HPI:  09/21/2020  64-year-old  who injured her knee approximately 16 years ago at work wears a wooden steps broke while going up.  She was eventually return to work with restrictions.  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  She did not hit the ground she held onto the truck.  She stated ibuprofen 800 mg helps but she ran out she really requesting renewal.  She has not received any injections over the last several years.  She has occasional catching locking and giving way.  Pain is 6/10 that is constant worst with stairs if she tries to go up.  Unable to squat or kneel.  Unable to stand or walk too long.  She is not using any assistive devices to ambulate.  Denies any fever or chills or shortness of breath or difficulty with chewing or swallowing loss of bowel bladder control or blurry vision or double vision or numbness or tingling in the legs or hands    11/09/2020  Steroid injection into the left knee as well as ibuprofen seems to have helped given on 09/21.  The story goes that she slipped getting out of her mail truck and slipped and grass.  She does have pre-existing arthritis in her left knee and this fall aggravated it.  She states the physical therapy is helping the injection helped the ibuprofen is helping.  Still afraid L catching locking.  Her pain is 5/10  Denies any fever or chills or shortness of breath or difficulty with chewing or swallowing loss of bowel bladder control.      01/07/2021  Patient arrived 21 min late for her appointment  Arthritis of both knees.  Posttraumatic left knee arthrosis.  She was instructed to take ibuprofen and Tylenol as needed.  Received steroid injection on 09/21/2020 with good relief of her pain  Still going to physical therapy at [facility].  She is doing slightly better now  she can get out of the tub instead of showering.  Walking 100 ft become severely painful has to sit down.  Still doing her dishes sitting down.  She is questioning my diagnosis at this point since her work says do not except arthritis as a condition.  I did tell her her injury worsened what she has her arthritis and considered posttraumatic arthritis of the knee.  This is a medical condition and has a code.  Since she is improving on ibuprofen and physical therapy and Tylenol no need to perform any injection.  Pain is 6/10    Denies any fever or chills or shortness of breath or difficulty with chewing or swallowing loss of bowel bladder control blurry vision double vision    02/26/2021  Left knee severe posttraumatic arthritis and right knee mild to moderate arthritis.  Numerous treatment including physical therapy, steroid injection 09/21/2020, hyaluronic injection 01/07/2021 and different NSAIDs did not seem to make be used difference just mild assistant.  She is using occasionally her cane to get around.  She is at a point she says she wants to proceed with her total knee replacement.  The ibuprofen does not seem to work as well.  She feels sometimes Tylenol work better.  Activities of daily living seems to be difficult even washing her dishes at this point seems to be impossible.    Denies any fever or chills or shortness of breath or difficulty with chewing or swallowing loss of bowel bladder control blurry vision double vision or loss of sense smell or taste    05/20/2021  Left knee severe posttraumatic arthritis.  Patient has denied total knee replacement.  She is taking gabapentin and meloxicam.  At this time she is not working.  Today nurse from [employer] is with her and patient agreed for her to sit and discuss.  Nurse's name Angela Camejo .  We did discuss and showed x-ray.  Discussed treatment plan which included injections of steroid different anti-inflammatories physical therapy, gabapentin..  So  far she still under review.  It eventually if that  gets denied need to go for independent medical examination  No fever no chills no shortness of breath or difficulty with chewing or swallowing loss of bowel bladder control blurry vision double vision loss sense smell or taste      07/26/2021  Left knee posttraumatic arthritis.  Things are getting worst since last time we have seen her.  Now it is locking on her in the middle of the night unable to get any relief.  She is still taking the gabapentin occasionally taking the meloxicam.  Her pain is 8/10.  Unable to stand for any .  More than 10 minutes.  She is having catching locking feeling in the knee despite not doing anything in the middle of the night also.  Unable to do steps.  Walking from the parking lot to the office was very difficult.  Using a cane to get around.  States she was scared that her knee locked on her in the middle of the night and could not do anything for it until slowly unlocked itself by the next day.  These episodes are becoming more frequent than normal.  Patient had been through anti-inflammatories, gabapentin, steroid injections, viscosupplementation injections with very mild and minimal relief.  No fever no chills no shortness of breath or difficulty with chewing or swallowing or loss of bowel bladder control blurry vision double vision loss sense smell or taste    02/14/2022  Left total knee replacement 12/29/2021.  Patient going to [facility] physical therapy in [place] but no true she.  She is taking Tylenol.  She ran out of her gabapentin and was not too sure if she needs to continue with it.  Her right knee with severe arthritis hurts her more than the left.  She is pleased with the results so far on the left knee and ambulating without any assistive devices.  She stated she was supposed to get a bath tub bench because of her tub and she needs assistance to get inside the tub and get out and never received it..  She complains  occasional numbness in the toes and on the outside of her knee.  I refreshed her memory that I did tell her that almost all total knees are numb on the outside of the knees and with time she will forget about it.  And as far as the numbness in the toes I did tell her that this is usually secondary to her back.  Pain is 5/10    03/28/2022  Left TKA 12/29/2021/worker's comp  Patient never received approval to continue to go to physical therapy at [facility] or the bathtub.  Patient stated the request was not sent to the worker's Comp person at [facility] to get the approval of the above.  Pain is 6/10.  She is able to ambulate without any assistive devices.  She said she is happy she had her surgery done.  She does not think she can return to work at this time.  She had lost time from not being able to go to physical therapy.  She does ambulate without any assistive devices.    Right knee she is having pain in that but that is a different worker's comp number and we will not evaluate today    06/17/2022    Patient has left TKA 12/29/2021 worker's comp    Has not done to physical therapy had not received bath tub bench.  She is doing her own independent exercises.  She still a little weak she does ambulate without any assistive devices.  She is taking gabapentin 300 twice a day and meloxicam on a daily basis.  Something with worker's comp approval and not approving things.  I cannot and is point know what is the problem.  When patients have undergo major surgery the required physical therapy and it is part of the deal.  She is happy that she had her total knee replacement not having any pain 0/10 but still having some weakness in the leg.  Past Medical History:   Diagnosis Date    Anxiety     Arthritis     left knee    Encounter for general adult medical examination without abnormal findings 05/09/2017    Hypertension     Vitamin D deficiency      Past Surgical History:   Procedure Laterality Date    HYSTERECTOMY       total    TOTAL KNEE ARTHROPLASTY Left 12/29/2021    Procedure: ARTHROPLASTY, KNEE, TOTAL;  Surgeon: Alf Santana MD;  Location: [facility];  Service: Orthopedics;  Laterality: Left;    VAGINAL DELIVERY       Family History   Problem Relation Age of Onset    Diabetes Mother     Hypertension Mother     Heart disease Father     Hypertension Sister     Heart disease Brother     Diabetes Brother     Cancer Maternal Grandmother         breast     Social History     Socioeconomic History    Marital status:    Tobacco Use    Smoking status: Never Smoker    Smokeless tobacco: Never Used   Substance and Sexual Activity    Alcohol use: Yes     Comment: social; hold 72hrs prior to surgery    Drug use: No    Sexual activity: Not Currently     Medication List with Changes/Refills   Current Medications    AMLODIPINE-BENAZEPRIL 10-20MG (LOTREL) 10-20 MG PER CAPSULE    Take 1 capsule by mouth once daily.    ASPIRIN 81 MG CHEW    Take 81 mg by mouth once daily.    CHLORTHALIDONE (HYGROTEN) 25 MG TAB    Take 1 tablet (25 mg total) by mouth once daily.    ERGOCALCIFEROL (ERGOCALCIFEROL) 50,000 UNIT CAP    Take 1 capsule (50,000 Units total) by mouth every 7 days.    GABAPENTIN (NEURONTIN) 300 MG CAPSULE    Take 1 capsule (300 mg total) by mouth every evening.    GABAPENTIN (NEURONTIN) 300 MG CAPSULE    Take 1 capsule (300 mg total) by mouth 2 (two) times daily.    MELOXICAM (MOBIC) 15 MG TABLET    Take 1 tablet (15 mg total) by mouth once daily. Take with food    ONDANSETRON (ZOFRAN-ODT) 4 MG TBDL    Dissolve 2 tablets (8 mg total) by mouth every 8 (eight) hours as needed.    SITAGLIPTAN-METFORMIN (JANUMET XR) 100-1,000 MG TM24    Take 1 tablet by mouth once daily.     Review of patient's allergies indicates:  No Known Allergies  Review of Systems   Constitutional: Negative for decreased appetite.   HENT: Negative for tinnitus.    Eyes: Negative for double vision.   Cardiovascular: Negative for chest  pain.   Respiratory: Negative for wheezing.    Hematologic/Lymphatic: Negative for bleeding problem.   Skin: Negative for dry skin.   Musculoskeletal: Positive for arthritis, joint pain and stiffness. Negative for back pain, falls, gout, joint swelling and neck pain.   Gastrointestinal: Negative for abdominal pain.   Genitourinary: Negative for bladder incontinence.   Neurological: Negative for numbness, paresthesias and sensory change.   Psychiatric/Behavioral: Negative for altered mental status.       Objective:   Body mass index is 41.02 kg/m².  There were no vitals filed for this visit.       General    Constitutional: She is oriented to person, place, and time. She appears well-developed.   HENT:   Head: Atraumatic.   Eyes: EOM are normal.   Cardiovascular: Normal rate.    Pulmonary/Chest: Effort normal.   Neurological: She is alert and oriented to person, place, and time.   Psychiatric: Judgment normal.              Ambulating without any assistive devices  Pelvis is level  Bilateral hips passive motion without pain or limitations.  There is no pain to palpation over the trochanters.  Hip flexors, abductors, adductors, quads, hamstrings, ankle extensors and flexors inverters and everters all 5/5  Left knee surgical incision healed well there is no drainage.  There is no warmness to touch.  Active motion 0-125 degrees.  Stable in extension and flexion.  Slight decrease in sensation in the lateral aspect of the knee joint.  There is no defect in the patella or quadriceps tendon her quads and hamstrings are still weak at 5-/5    Able to move her ankle up and down  Right knee mild medial joint pain/moderate lateral joint pain.  No swelling.  Motion 0-130 degrees.  Collaterals and cruciates are stable.  Negative Bienvenido sign.  No defect in the patella or quadriceps tendon  Bilateral calves are soft nontender  Slight swelling around the ankle  DP 1+  PT 1+  Skin is warm to touch no obvious lesions.  Sensory intact  to touch    Relevant imaging results reviewed and interpreted by me, discussed with the patient and / or family today   X-ray 02/14/2022 left TKA in excellent alignment.  No evidence of fracture.  Patella midline.  Right knee with complete loss lateral joint space without fracture on AP with flexion  X-ray 12/29/2021 left TKA without evidence of fracture.  Do not have true AP and lateral views.  Staples intact  Personally reviewed the x-rays of both knees with the patient agree with the above  X-ray 09/03/2020 bilateral knees with left knee severe loss of medial joint space and osteophytic changes anteriorly and as well as laterally.  Also the right knee has some arthritic changes with not as bad loss of joint space as the left  Assessment:     Encounter Diagnoses   Name Primary?    History of total left knee replacement Yes    Arthritis of knee, right     Acquired valgus deformity knee, right     Morbid obesity with BMI of 40.0-44.9, adult         Plan:   History of total left knee replacement    Arthritis of knee, right    Acquired valgus deformity knee, right    Morbid obesity with BMI of 40.0-44.9, adult         Patient Instructions   You not having any much of knee pain on the left total knee.  She had not gotten approval for physical therapy or the tub bench that we have requested it has been 6 months now  Delay in physical therapy will delay return to work  She needs strengthening exercises and that is the reasoning behind needing physical therapy  She did her range of motion on her own  Continue with the gabapentin 300 mg twice a day  Continue with the meloxicam 15 mg once a day  You can take Tylenol 325 mg 2 tablets twice a day on top of the meloxicam only if needed    And thing the patient right now it is ready to return to work.  She needs to gain some more time with physical therapy which she had lost since last visit    Disclaimer: This note was prepared using a voice recognition system and is  likely to have sound alike errors within the text.

## 2022-06-17 NOTE — PATIENT INSTRUCTIONS
You not having any much of knee pain on the left total knee.  She had not gotten approval for physical therapy or the tub bench that we have requested it has been 6 months now  Delay in physical therapy will delay return to work  She needs strengthening exercises and that is the reasoning behind needing physical therapy  She did her range of motion on her own  Continue with the gabapentin 300 mg twice a day  Continue with the meloxicam 15 mg once a day  You can take Tylenol 325 mg 2 tablets twice a day on top of the meloxicam only if needed

## 2022-08-05 ENCOUNTER — TELEPHONE (OUTPATIENT)
Dept: ORTHOPEDICS | Facility: CLINIC | Age: 67
End: 2022-08-05
Payer: OTHER GOVERNMENT

## 2022-08-05 NOTE — TELEPHONE ENCOUNTER
----- Message from Susan Montelongo sent at 8/5/2022 12:33 PM CDT -----  Contact: Madhuri Dhaliwal would like a call back at [phone], Regards to what [employer]  the use that takes [employer].    Thanks  Td

## 2022-08-05 NOTE — TELEPHONE ENCOUNTER
Called the patient to let her know she would need to find out who takes her insurance and then we could send the order for whatever she needs from the DME.

## 2022-09-08 ENCOUNTER — TELEPHONE (OUTPATIENT)
Dept: ORTHOPEDICS | Facility: CLINIC | Age: 67
End: 2022-09-08
Payer: OTHER GOVERNMENT

## 2022-09-08 NOTE — TELEPHONE ENCOUNTER
Called patient to let her know she would have to come  a new handicap tag paper. Left  for call back.

## 2022-09-08 NOTE — TELEPHONE ENCOUNTER
----- Message from Chanel Carmen sent at 9/8/2022  3:12 PM CDT -----  Contact: Pt  Pt is wanting to know when she can come get a copy for her handicapped tag/ can't find the paper she was given and needs to have another one called in and can be reached at [phone]//thanks/dbw

## 2022-09-09 ENCOUNTER — TELEPHONE (OUTPATIENT)
Dept: ORTHOPEDICS | Facility: CLINIC | Age: 67
End: 2022-09-09
Payer: OTHER GOVERNMENT

## 2022-09-09 NOTE — TELEPHONE ENCOUNTER
----- Message from Deisi Mcclellan sent at 9/9/2022 12:48 PM CDT -----  Please call pt @ [phone] regarding message left yesterday, pt states she did not understand the message that nurse left

## 2022-09-09 NOTE — TELEPHONE ENCOUNTER
Called patient back I told her she can get a new handicap tag at her next appointment on 09/22/22. Patient verbalized understanding and was satisfied with that

## 2022-09-22 ENCOUNTER — OFFICE VISIT (OUTPATIENT)
Dept: ORTHOPEDICS | Facility: CLINIC | Age: 67
End: 2022-09-22
Payer: OTHER GOVERNMENT

## 2022-09-22 VITALS — BODY MASS INDEX: 41.47 KG/M2 | HEIGHT: 67 IN | WEIGHT: 264.25 LBS

## 2022-09-22 DIAGNOSIS — M21.061 ACQUIRED VALGUS DEFORMITY KNEE, RIGHT: ICD-10-CM

## 2022-09-22 DIAGNOSIS — M17.11 ARTHRITIS OF KNEE, RIGHT: ICD-10-CM

## 2022-09-22 DIAGNOSIS — Z96.652 HISTORY OF TOTAL LEFT KNEE REPLACEMENT: Primary | ICD-10-CM

## 2022-09-22 DIAGNOSIS — E66.01 MORBID OBESITY WITH BMI OF 40.0-44.9, ADULT: ICD-10-CM

## 2022-09-22 PROCEDURE — 99213 PR OFFICE/OUTPT VISIT, EST, LEVL III, 20-29 MIN: ICD-10-PCS | Mod: S$GLB,,, | Performed by: ORTHOPAEDIC SURGERY

## 2022-09-22 PROCEDURE — 99213 OFFICE O/P EST LOW 20 MIN: CPT | Mod: S$GLB,,, | Performed by: ORTHOPAEDIC SURGERY

## 2022-09-22 PROCEDURE — 99999 PR PBB SHADOW E&M-EST. PATIENT-LVL III: CPT | Mod: PBBFAC,,, | Performed by: ORTHOPAEDIC SURGERY

## 2022-09-22 PROCEDURE — 99999 PR PBB SHADOW E&M-EST. PATIENT-LVL III: ICD-10-PCS | Mod: PBBFAC,,, | Performed by: ORTHOPAEDIC SURGERY

## 2022-09-22 NOTE — PATIENT INSTRUCTIONS
Finally got approved for physical therapy on her left total knee   It was a mishap it was put in for the right knee which is not the same worker's comp injury /claim number as the left.  Continue with the gabapentin  [facility] to get [facility] in maybe we can achieve a little bit more range of motion  Will see you back in 3 months   Still not ready to return to work

## 2022-09-22 NOTE — PROGRESS NOTES
Subjective:     Patient ID: Madhuri Hickman is a 66 y.o. female.    Chief Complaint: Post-op Evaluation of the Left Knee   Mild right knee pain  HPI:  09/21/2020  64-year-old  who injured her knee approximately 16 years ago at work wears a wooden steps broke while going up.  She was eventually return to work with restrictions.  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  She did not hit the ground she held onto the truck.  She stated ibuprofen 800 mg helps but she ran out she really requesting renewal.  She has not received any injections over the last several years.  She has occasional catching locking and giving way.  Pain is 6/10 that is constant worst with stairs if she tries to go up.  Unable to squat or kneel.  Unable to stand or walk too long.  She is not using any assistive devices to ambulate.  Denies any fever or chills or shortness of breath or difficulty with chewing or swallowing loss of bowel bladder control or blurry vision or double vision or numbness or tingling in the legs or hands    11/09/2020  Steroid injection into the left knee as well as ibuprofen seems to have helped given on 09/21.  The story goes that she slipped getting out of her mail truck and slipped and grass.  She does have pre-existing arthritis in her left knee and this fall aggravated it.  She states the physical therapy is helping the injection helped the ibuprofen is helping.  Still afraid L catching locking.  Her pain is 5/10  Denies any fever or chills or shortness of breath or difficulty with chewing or swallowing loss of bowel bladder control.      01/07/2021  Patient arrived 21 min late for her appointment  Arthritis of both knees.  Posttraumatic left knee arthrosis.  She was instructed to take ibuprofen and Tylenol as needed.  Received steroid injection on 09/21/2020 with good relief of her pain  Still going to physical therapy at [facility].  She is doing slightly better now  she can get out of the tub instead of showering.  Walking 100 ft become severely painful has to sit down.  Still doing her dishes sitting down.  She is questioning my diagnosis at this point since her work says do not except arthritis as a condition.  I did tell her her injury worsened what she has her arthritis and considered posttraumatic arthritis of the knee.  This is a medical condition and has a code.  Since she is improving on ibuprofen and physical therapy and Tylenol no need to perform any injection.  Pain is 6/10    Denies any fever or chills or shortness of breath or difficulty with chewing or swallowing loss of bowel bladder control blurry vision double vision    02/26/2021  Left knee severe posttraumatic arthritis and right knee mild to moderate arthritis.  Numerous treatment including physical therapy, steroid injection 09/21/2020, hyaluronic injection 01/07/2021 and different NSAIDs did not seem to make be used difference just mild assistant.  She is using occasionally her cane to get around.  She is at a point she says she wants to proceed with her total knee replacement.  The ibuprofen does not seem to work as well.  She feels sometimes Tylenol work better.  Activities of daily living seems to be difficult even washing her dishes at this point seems to be impossible.    Denies any fever or chills or shortness of breath or difficulty with chewing or swallowing loss of bowel bladder control blurry vision double vision or loss of sense smell or taste    05/20/2021  Left knee severe posttraumatic arthritis.  Patient has denied total knee replacement.  She is taking gabapentin and meloxicam.  At this time she is not working.  Today nurse from [employer] is with her and patient agreed for her to sit and discuss.  Nurse's name Angela Camejo .  We did discuss and showed x-ray.  Discussed treatment plan which included injections of steroid different anti-inflammatories physical therapy, gabapentin..  So  far she still under review.  It eventually if that  gets denied need to go for independent medical examination  No fever no chills no shortness of breath or difficulty with chewing or swallowing loss of bowel bladder control blurry vision double vision loss sense smell or taste      07/26/2021  Left knee posttraumatic arthritis.  Things are getting worst since last time we have seen her.  Now it is locking on her in the middle of the night unable to get any relief.  She is still taking the gabapentin occasionally taking the meloxicam.  Her pain is 8/10.  Unable to stand for any .  More than 10 minutes.  She is having catching locking feeling in the knee despite not doing anything in the middle of the night also.  Unable to do steps.  Walking from the parking lot to the office was very difficult.  Using a cane to get around.  States she was scared that her knee locked on her in the middle of the night and could not do anything for it until slowly unlocked itself by the next day.  These episodes are becoming more frequent than normal.  Patient had been through anti-inflammatories, gabapentin, steroid injections, viscosupplementation injections with very mild and minimal relief.  No fever no chills no shortness of breath or difficulty with chewing or swallowing or loss of bowel bladder control blurry vision double vision loss sense smell or taste    02/14/2022  Left total knee replacement 12/29/2021.  Patient going to [facility] physical therapy in [place] but no true she.  She is taking Tylenol.  She ran out of her gabapentin and was not too sure if she needs to continue with it.  Her right knee with severe arthritis hurts her more than the left.  She is pleased with the results so far on the left knee and ambulating without any assistive devices.  She stated she was supposed to get a bath tub bench because of her tub and she needs assistance to get inside the tub and get out and never received it..  She complains  occasional numbness in the toes and on the outside of her knee.  I refreshed her memory that I did tell her that almost all total knees are numb on the outside of the knees and with time she will forget about it.  And as far as the numbness in the toes I did tell her that this is usually secondary to her back.  Pain is 5/10    03/28/2022  Left TKA 12/29/2021/worker's comp  Patient never received approval to continue to go to physical therapy at [facility] or the [facility].  Patient stated the request was not sent to the worker's Comp person at [facility] to get the approval of the above.  Pain is 6/10.  She is able to ambulate without any assistive devices.  She said she is happy she had her surgery done.  She does not think she can return to work at this time.  She had lost time from not being able to go to physical therapy.  She does ambulate without any assistive devices.    Right knee she is having pain in that but that is a different worker's comp number and we will not evaluate today    06/17/2022    Patient has left TKA 12/29/2021 worker's comp    Has not done to physical therapy had not received bath tub bench.  She is doing her own independent exercises.  She still a little weak she does ambulate without any assistive devices.  She is taking gabapentin 300 twice a day and meloxicam on a daily basis.  Something with worker's comp approval and not approving things.  I cannot and is point know what is the problem.  When patients have undergo major surgery the required physical therapy and it is part of the deal.  She is happy that she had her total knee replacement not having any pain 0/10 but still having some weakness in the leg.    09/22/2022 /checked in 10 minutes late for her appointment  Left TKA 12/29/2021/worker's comp.  Right knee arthritis is a different worker's comp claim  Patient stated the worker's comp physical therapy referral was placed on the right knee which is not the 1 we operated on.  Now  finally she starting physical therapy over the last 2 weeks at the [facility].  She starting with the water therapy.  She is walking without any assistive devices.  She still taking the gabapentin.  Her pain is 5/10.  Now she is having a little bit more pain in the right knee compared to the left.  No fever no chills no shortness of breath difficulty with chewing swallowing loss of bowel bladder control blurry vision double vision loss sense smell or taste     She has not return to work return to work  Past Medical History:   Diagnosis Date    Anxiety     Arthritis     left knee    Encounter for general adult medical examination without abnormal findings 05/09/2017    Hypertension     Vitamin D deficiency      Past Surgical History:   Procedure Laterality Date    HYSTERECTOMY      total    TOTAL KNEE ARTHROPLASTY Left 12/29/2021    Procedure: ARTHROPLASTY, KNEE, TOTAL;  Surgeon: Alf Santana MD;  Location: [facility];  Service: Orthopedics;  Laterality: Left;    VAGINAL DELIVERY       Family History   Problem Relation Age of Onset    Diabetes Mother     Hypertension Mother     Heart disease Father     Hypertension Sister     Heart disease Brother     Diabetes Brother     Cancer Maternal Grandmother         breast     Social History     Socioeconomic History    Marital status:    Tobacco Use    Smoking status: Never    Smokeless tobacco: Never   Substance and Sexual Activity    Alcohol use: Yes     Comment: social; hold 72hrs prior to surgery    Drug use: No    Sexual activity: Not Currently     Medication List with Changes/Refills   Current Medications    AMLODIPINE-BENAZEPRIL 10-20MG (LOTREL) 10-20 MG PER CAPSULE    Take 1 capsule by mouth once daily.    ASPIRIN 81 MG CHEW    Take 81 mg by mouth once daily.    CHLORTHALIDONE (HYGROTEN) 25 MG TAB    Take 1 tablet (25 mg total) by mouth once daily.    ERGOCALCIFEROL (ERGOCALCIFEROL) 50,000 UNIT CAP    Take 1 capsule (50,000 Units total)  by mouth every 7 days.    GABAPENTIN (NEURONTIN) 300 MG CAPSULE    Take 1 capsule (300 mg total) by mouth every evening.    GABAPENTIN (NEURONTIN) 300 MG CAPSULE    Take 1 capsule (300 mg total) by mouth 2 (two) times daily.    MELOXICAM (MOBIC) 15 MG TABLET    TAKE 1 TABLET (15 MG TOTAL) BY MOUTH ONCE DAILY. TAKE WITH FOOD    ONDANSETRON (ZOFRAN-ODT) 4 MG TBDL    Dissolve 2 tablets (8 mg total) by mouth every 8 (eight) hours as needed.    SITAGLIPTAN-METFORMIN (JANUMET XR) 100-1,000 MG TM24    Take 1 tablet by mouth once daily.     Review of patient's allergies indicates:  No Known Allergies  Review of Systems   Constitutional: Negative for decreased appetite.   HENT:  Negative for tinnitus.    Eyes:  Negative for double vision.   Cardiovascular:  Negative for chest pain.   Respiratory:  Negative for wheezing.    Hematologic/Lymphatic: Negative for bleeding problem.   Skin:  Negative for dry skin.   Musculoskeletal:  Positive for arthritis, joint pain and stiffness. Negative for back pain, falls, gout, joint swelling and neck pain.   Gastrointestinal:  Negative for abdominal pain.   Genitourinary:  Negative for bladder incontinence.   Neurological:  Negative for numbness, paresthesias and sensory change.   Psychiatric/Behavioral:  Negative for altered mental status.      Objective:   Body mass index is 41.38 kg/m².  There were no vitals filed for this visit.       General    Constitutional: She is oriented to person, place, and time. She appears well-developed.   HENT:   Head: Atraumatic.   Eyes: EOM are normal.   Cardiovascular:  Normal rate.            Pulmonary/Chest: Effort normal.   Neurological: She is alert and oriented to person, place, and time.   Psychiatric: Judgment normal.            Ambulating without any assistive devices  Pelvis is level  Bilateral hips passive motion without pain or limitations.  There is no pain to palpation over the trochanters.  Hip flexors, abductors, adductors, quads,  hamstrings, ankle extensors and flexors inverters and everters all 5/5  Left knee surgical incision healed well there is no drainage.  There is no warmness to touch.  Active motion 0-120 degrees.  Stable in extension and flexion.  Slight decrease in sensation in the lateral aspect of the knee joint.  There is no defect in the patella or quadriceps tendon her quads and hamstrings are still weak at 5-/5    Able to move her ankle up and down  Right knee mild medial joint pain/moderate lateral joint pain.  No swelling.  Motion 0-120 degrees.  Collaterals and cruciates are stable.  Negative Bienvenido sign.  No defect in the patella or quadriceps tendon  Bilateral calves are soft nontender  Slight swelling around the ankle  DP 1+  PT 1+  Skin is warm to touch no obvious lesions.  Sensory intact to touch    Relevant imaging results reviewed and interpreted by me, discussed with the patient and / or family today   X-ray 02/14/2022 left TKA in excellent alignment.  No evidence of fracture.  Patella midline.  Right knee with complete loss lateral joint space without fracture on AP with flexion  X-ray 12/29/2021 left TKA without evidence of fracture.  Do not have true AP and lateral views.  Staples intact  Personally reviewed the x-rays of both knees with the patient agree with the above  X-ray 09/03/2020 bilateral knees with left knee severe loss of medial joint space and osteophytic changes anteriorly and as well as laterally.  Also the right knee has some arthritic changes with not as bad loss of joint space as the left  Assessment:     Encounter Diagnoses   Name Primary?    History of total left knee replacement Yes    Arthritis of knee, right     Acquired valgus deformity knee, right     Morbid obesity with BMI of 40.0-44.9, adult         Plan:   History of total left knee replacement    Arthritis of knee, right    Acquired valgus deformity knee, right    Morbid obesity with BMI of 40.0-44.9, adult       Patient  Instructions   Finally got approved for physical therapy on her left total knee   It was a mishap it was put in for the right knee which is not the same worker's comp injury /claim number as the left.  Continue with the gabapentin  [facility] get [facility] in maybe we can achieve a little bit more range of motion  Will see you back in 3 months   Still not ready to return to work  And thing the patient right now it is ready to return to work.  She needs to gain some more time with physical therapy which she had lost since last visit  Renewal of the disability parking sticker was done today  Will re-evaluate in 3 months after she finishes the therapy    Disclaimer: This note was prepared using a voice recognition system and is likely to have sound alike errors within the text.

## 2022-10-14 DIAGNOSIS — Z96.652 HISTORY OF TOTAL LEFT KNEE REPLACEMENT: Primary | ICD-10-CM

## 2022-10-31 ENCOUNTER — TELEPHONE (OUTPATIENT)
Dept: ORTHOPEDICS | Facility: CLINIC | Age: 67
End: 2022-10-31
Payer: OTHER GOVERNMENT

## 2022-10-31 DIAGNOSIS — M17.11 ARTHRITIS OF KNEE, RIGHT: ICD-10-CM

## 2022-10-31 DIAGNOSIS — Z96.652 HISTORY OF TOTAL LEFT KNEE REPLACEMENT: Primary | ICD-10-CM

## 2022-10-31 NOTE — TELEPHONE ENCOUNTER
Spoke with long and new order for PT was faxed over -- verbalized understanding and thankful for call

## 2022-10-31 NOTE — TELEPHONE ENCOUNTER
----- Message from Vanesa Rueda sent at 10/31/2022 10:47 AM CDT -----  Contact: Tianna Wynn from [facility] is requesting a callback in regards to obtaining several doctors referral.    Tianna can be reached at [phone].      Thanks

## 2022-12-12 ENCOUNTER — OFFICE VISIT (OUTPATIENT)
Dept: ORTHOPEDICS | Facility: CLINIC | Age: 67
End: 2022-12-12

## 2022-12-12 VITALS — BODY MASS INDEX: 41.03 KG/M2 | HEIGHT: 67 IN | WEIGHT: 261.44 LBS

## 2022-12-12 DIAGNOSIS — M17.32 POST-TRAUMATIC OSTEOARTHRITIS OF LEFT KNEE: ICD-10-CM

## 2022-12-12 DIAGNOSIS — Z96.652 HISTORY OF TOTAL LEFT KNEE REPLACEMENT: Primary | ICD-10-CM

## 2022-12-12 DIAGNOSIS — M17.11 ARTHRITIS OF KNEE, RIGHT: ICD-10-CM

## 2022-12-12 DIAGNOSIS — E66.01 MORBID OBESITY WITH BMI OF 40.0-44.9, ADULT: ICD-10-CM

## 2022-12-12 DIAGNOSIS — M21.061 ACQUIRED VALGUS DEFORMITY KNEE, RIGHT: ICD-10-CM

## 2022-12-12 PROCEDURE — 99999 PR PBB SHADOW E&M-EST. PATIENT-LVL III: CPT | Mod: PBBFAC,,, | Performed by: ORTHOPAEDIC SURGERY

## 2022-12-12 PROCEDURE — 99213 OFFICE O/P EST LOW 20 MIN: CPT | Mod: S$PBB,,, | Performed by: ORTHOPAEDIC SURGERY

## 2022-12-12 PROCEDURE — 99213 PR OFFICE/OUTPT VISIT, EST, LEVL III, 20-29 MIN: ICD-10-PCS | Mod: S$PBB,,, | Performed by: ORTHOPAEDIC SURGERY

## 2022-12-12 PROCEDURE — 99999 PR PBB SHADOW E&M-EST. PATIENT-LVL III: ICD-10-PCS | Mod: PBBFAC,,, | Performed by: ORTHOPAEDIC SURGERY

## 2022-12-12 PROCEDURE — 99213 OFFICE O/P EST LOW 20 MIN: CPT | Mod: PBBFAC | Performed by: ORTHOPAEDIC SURGERY

## 2022-12-12 NOTE — PROGRESS NOTES
Subjective:     Patient ID: Madhuri Hickman is a 67 y.o. female.    Chief Complaint: Post-op Evaluation of the Left Knee     Mild right knee pain  HPI:  09/21/2020  64-year-old  who injured her knee approximately 16 years ago at work wears a wooden steps broke while going up.  She was eventually return to work with restrictions.  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  She did not hit the ground she held onto the truck.  She stated ibuprofen 800 mg helps but she ran out she really requesting renewal.  She has not received any injections over the last several years.  She has occasional catching locking and giving way.  Pain is 6/10 that is constant worst with stairs if she tries to go up.  Unable to squat or kneel.  Unable to stand or walk too long.  She is not using any assistive devices to ambulate.  Denies any fever or chills or shortness of breath or difficulty with chewing or swallowing loss of bowel bladder control or blurry vision or double vision or numbness or tingling in the legs or hands    11/09/2020  Steroid injection into the left knee as well as ibuprofen seems to have helped given on 09/21.  The story goes that she slipped getting out of her mail truck and slipped and grass.  She does have pre-existing arthritis in her left knee and this fall aggravated it.  She states the physical therapy is helping the injection helped the ibuprofen is helping.  Still afraid L catching locking.  Her pain is 5/10  Denies any fever or chills or shortness of breath or difficulty with chewing or swallowing loss of bowel bladder control.      01/07/2021  Patient arrived 21 min late for her appointment  Arthritis of both knees.  Posttraumatic left knee arthrosis.  She was instructed to take ibuprofen and Tylenol as needed.  Received steroid injection on 09/21/2020 with good relief of her pain  Still going to physical therapy at [facility].  She is doing slightly better now  she can get out of the tub instead of showering.  Walking 100 ft become severely painful has to sit down.  Still doing her dishes sitting down.  She is questioning my diagnosis at this point since her work says do not except arthritis as a condition.  I did tell her her injury worsened what she has her arthritis and considered posttraumatic arthritis of the knee.  This is a medical condition and has a code.  Since she is improving on ibuprofen and physical therapy and Tylenol no need to perform any injection.  Pain is 6/10    Denies any fever or chills or shortness of breath or difficulty with chewing or swallowing loss of bowel bladder control blurry vision double vision    02/26/2021  Left knee severe posttraumatic arthritis and right knee mild to moderate arthritis.  Numerous treatment including physical therapy, steroid injection 09/21/2020, hyaluronic injection 01/07/2021 and different NSAIDs did not seem to make be used difference just mild assistant.  She is using occasionally her cane to get around.  She is at a point she says she wants to proceed with her total knee replacement.  The ibuprofen does not seem to work as well.  She feels sometimes Tylenol work better.  Activities of daily living seems to be difficult even washing her dishes at this point seems to be impossible.    Denies any fever or chills or shortness of breath or difficulty with chewing or swallowing loss of bowel bladder control blurry vision double vision or loss of sense smell or taste    05/20/2021  Left knee severe posttraumatic arthritis.  Patient has denied total knee replacement.  She is taking gabapentin and meloxicam.  At this time she is not working.  Today nurse from [employer] is with her and patient agreed for her to sit and discuss.  Nurse's name Angela Camejo .  We did discuss and showed x-ray.  Discussed treatment plan which included injections of steroid different anti-inflammatories physical therapy, gabapentin..  So  far she still under review.  It eventually if that  gets denied need to go for independent medical examination  No fever no chills no shortness of breath or difficulty with chewing or swallowing loss of bowel bladder control blurry vision double vision loss sense smell or taste      07/26/2021  Left knee posttraumatic arthritis.  Things are getting worst since last time we have seen her.  Now it is locking on her in the middle of the night unable to get any relief.  She is still taking the gabapentin occasionally taking the meloxicam.  Her pain is 8/10.  Unable to stand for any .  More than 10 minutes.  She is having catching locking feeling in the knee despite not doing anything in the middle of the night also.  Unable to do steps.  Walking from the parking lot to the office was very difficult.  Using a cane to get around.  States she was scared that her knee locked on her in the middle of the night and could not do anything for it until slowly unlocked itself by the next day.  These episodes are becoming more frequent than normal.  Patient had been through anti-inflammatories, gabapentin, steroid injections, viscosupplementation injections with very mild and minimal relief.  No fever no chills no shortness of breath or difficulty with chewing or swallowing or loss of bowel bladder control blurry vision double vision loss sense smell or taste    02/14/2022  Left total knee replacement 12/29/2021.  Patient going to [facility] physical therapy in [place] but no true she.  She is taking Tylenol.  She ran out of her gabapentin and was not too sure if she needs to continue with it.  Her right knee with severe arthritis hurts her more than the left.  She is pleased with the results so far on the left knee and ambulating without any assistive devices.  She stated she was supposed to get a bath tub bench because of her tub and she needs assistance to get inside the tub and get out and never received it..  She complains  occasional numbness in the toes and on the outside of her knee.  I refreshed her memory that I did tell her that almost all total knees are numb on the outside of the knees and with time she will forget about it.  And as far as the numbness in the toes I did tell her that this is usually secondary to her back.  Pain is 5/10    03/28/2022  Left TKA 12/29/2021/worker's comp  Patient never received approval to continue to go to physical therapy at [facility] or the [place].  Patient stated the request was not sent to the worker's Comp person at [facility] to get the approval of the above.  Pain is 6/10.  She is able to ambulate without any assistive devices.  She said she is happy she had her surgery done.  She does not think she can return to work at this time.  She had lost time from not being able to go to physical therapy.  She does ambulate without any assistive devices.    Right knee she is having pain in that but that is a different worker's comp number and we will not evaluate today    06/17/2022    Patient has left TKA 12/29/2021 worker's comp    Has not done to physical therapy had not received bath tub bench.  She is doing her own independent exercises.  She still a little weak she does ambulate without any assistive devices.  She is taking gabapentin 300 twice a day and meloxicam on a daily basis.  Something with worker's comp approval and not approving things.  I cannot and is point know what is the problem.  When patients have undergo major surgery the required physical therapy and it is part of the deal.  She is happy that she had her total knee replacement not having any pain 0/10 but still having some weakness in the leg.    09/22/2022 /checked in 10 minutes late for her appointment  Left TKA 12/29/2021/worker's comp.  Right knee arthritis is a different worker's comp claim  Patient stated the worker's comp physical therapy referral was placed on the right knee which is not the 1 we operated on.  Now  finally she starting physical therapy over the last 2 weeks at the [facility] in [facility].  She starting with the water therapy.  She is walking without any assistive devices.  She still taking the gabapentin.  Her pain is 5/10.  Now she is having a little bit more pain in the right knee compared to the left.  No fever no chills no shortness of breath difficulty with chewing swallowing loss of bowel bladder control blurry vision double vision loss sense smell or taste     She has not return to work return to work    12/12/2022    Left TKA 12/29/2021 which is a worker's comp injury different claim than the right side   Patient finally is going to [facility] physical therapy [facility].  She still taking gabapentin and meloxicam.  She is ambulating without any assistive devices today.  She said she is very happy with the results on the left knee her pain is 0/10 because she is taking the medication   No fever no chills no shortness of breath or difficulty with chewing swallowing or loss of bowel bladder control  Past Medical History:   Diagnosis Date    Anxiety     Arthritis     left knee    Encounter for general adult medical examination without abnormal findings 05/09/2017    Hypertension     Vitamin D deficiency      Past Surgical History:   Procedure Laterality Date    HYSTERECTOMY      total    TOTAL KNEE ARTHROPLASTY Left 12/29/2021    Procedure: ARTHROPLASTY, KNEE, TOTAL;  Surgeon: Alf Santana MD;  Location: [facility];  Service: Orthopedics;  Laterality: Left;    VAGINAL DELIVERY       Family History   Problem Relation Age of Onset    Diabetes Mother     Hypertension Mother     Heart disease Father     Hypertension Sister     Heart disease Brother     Diabetes Brother     Cancer Maternal Grandmother         breast     Social History     Socioeconomic History    Marital status:    Tobacco Use    Smoking status: Never    Smokeless tobacco: Never   Substance and Sexual Activity    Alcohol use:  Yes     Comment: social; hold 72hrs prior to surgery    Drug use: No    Sexual activity: Not Currently     Medication List with Changes/Refills   Current Medications    AMLODIPINE-BENAZEPRIL 10-20MG (LOTREL) 10-20 MG PER CAPSULE    Take 1 capsule by mouth once daily.    ASPIRIN 81 MG CHEW    Take 81 mg by mouth once daily.    CHLORTHALIDONE (HYGROTEN) 25 MG TAB    Take 1 tablet (25 mg total) by mouth once daily.    ERGOCALCIFEROL (ERGOCALCIFEROL) 50,000 UNIT CAP    Take 1 capsule (50,000 Units total) by mouth every 7 days.    GABAPENTIN (NEURONTIN) 300 MG CAPSULE    Take 1 capsule (300 mg total) by mouth every evening.    GABAPENTIN (NEURONTIN) 300 MG CAPSULE    Take 1 capsule (300 mg total) by mouth 2 (two) times daily.    MELOXICAM (MOBIC) 15 MG TABLET    TAKE 1 TABLET (15 MG TOTAL) BY MOUTH ONCE DAILY. TAKE WITH FOOD    ONDANSETRON (ZOFRAN-ODT) 4 MG TBDL    Dissolve 2 tablets (8 mg total) by mouth every 8 (eight) hours as needed.    SITAGLIPTAN-METFORMIN (JANUMET XR) 100-1,000 MG TM24    Take 1 tablet by mouth once daily.     Review of patient's allergies indicates:  No Known Allergies  Review of Systems   Constitutional: Negative for decreased appetite.   HENT:  Negative for tinnitus.    Eyes:  Negative for double vision.   Cardiovascular:  Negative for chest pain.   Respiratory:  Negative for wheezing.    Hematologic/Lymphatic: Negative for bleeding problem.   Skin:  Negative for dry skin.   Musculoskeletal:  Positive for arthritis, joint pain and stiffness. Negative for back pain, falls, gout, joint swelling and neck pain.   Gastrointestinal:  Negative for abdominal pain.   Genitourinary:  Negative for bladder incontinence.   Neurological:  Negative for numbness, paresthesias and sensory change.   Psychiatric/Behavioral:  Negative for altered mental status.      Objective:   Body mass index is 40.95 kg/m².  There were no vitals filed for this visit.       General    Constitutional: She is oriented to person,  place, and time. She appears well-developed.   HENT:   Head: Atraumatic.   Eyes: EOM are normal.   Cardiovascular:  Normal rate.            Pulmonary/Chest: Effort normal.   Neurological: She is alert and oriented to person, place, and time.   Psychiatric: Judgment normal.            Ambulating without any assistive devices  Pelvis is level  Bilateral hips passive motion without pain or limitations.  There is no pain to palpation over the trochanters.  Hip flexors, abductors, adductors, quads, hamstrings, ankle extensors and flexors inverters and everters all 5/5  Left knee surgical incision healed well there is no drainage.  There is no warmness to touch.  Active motion 0-130 degrees.  Stable in extension and flexion.  Slight decrease in sensation in the lateral aspect of the knee joint.  There is no defect in the patella or quadriceps tendon her quads and hamstrings are still weak at 5-/5    Able to move her ankle up and down  Right knee mild medial joint pain/moderate lateral joint pain.  No swelling.  Motion 0-120 degrees.  Collaterals and cruciates are stable.  Negative Bienvenido sign.  No defect in the patella or quadriceps tendon  Bilateral calves are soft nontender  Slight swelling around the ankle  DP 1+  PT 1+  Skin is warm to touch no obvious lesions.  Sensory intact to touch    Relevant imaging results reviewed and interpreted by me, discussed with the patient and / or family today   X-ray 02/14/2022 left TKA in excellent alignment.  No evidence of fracture.  Patella midline.  Right knee with complete loss lateral joint space without fracture on AP with flexion  X-ray 12/29/2021 left TKA without evidence of fracture.  Do not have true AP and lateral views.  Staples intact  Personally reviewed the x-rays of both knees with the patient agree with the above  X-ray 09/03/2020 bilateral knees with left knee severe loss of medial joint space and osteophytic changes anteriorly and as well as laterally.  Also the  right knee has some arthritic changes with not as bad loss of joint space as the left  Assessment:     Encounter Diagnoses   Name Primary?    Arthritis of knee, right     Acquired valgus deformity knee, right     History of total left knee replacement Yes    Morbid obesity with BMI of 40.0-44.9, adult         Plan:   History of total left knee replacement    Arthritis of knee, right    Acquired valgus deformity knee, right    Morbid obesity with BMI of 40.0-44.9, adult       Patient Instructions   Continue with [facility] physical therapy in [facility] for the left knee  I really like your range of motion   Once you finish her physical therapy you need to have functional capacity evaluation will ask worker's comp to approve you for it.  It will show us what you can do or not if you are safe to the liver or just to be on the desk.  Worker's comp sometimes pick this place [facility] is [facility]  Can not return to work unless functional capacity evaluation is performed   As far as the right needs another worker's comp claim    Continue with gabapentin and meloxicam  I will see you back in 3 months  Will re-evaluate in 3 months after she finishes the therapy/FCE    Disclaimer: This note was prepared using a voice recognition system and is likely to have sound alike errors within the text.

## 2022-12-12 NOTE — PATIENT INSTRUCTIONS
Continue with [facility] physical therapy in [facility] for the left knee  I really like your range of motion   Once you finish her physical therapy you need to have functional capacity evaluation will ask worker's comp to approve you for it.  It will show us what you can do or not if you are safe to the liver or just to be on the desk.  Worker's comp sometimes pick [facility] place [facility] is [facility]  Can not return to work unless functional capacity evaluation is performed   As far as the right needs another worker's comp claim    Continue with gabapentin and meloxicam  I will see you back in 3 months

## 2023-03-02 DIAGNOSIS — Z96.652 HISTORY OF TOTAL LEFT KNEE REPLACEMENT: Primary | ICD-10-CM

## 2023-03-16 ENCOUNTER — HOSPITAL ENCOUNTER (OUTPATIENT)
Dept: RADIOLOGY | Facility: HOSPITAL | Age: 68
Discharge: HOME | End: 2023-03-16
Attending: ORTHOPAEDIC SURGERY
Payer: OTHER GOVERNMENT

## 2023-03-16 ENCOUNTER — OFFICE VISIT (OUTPATIENT)
Dept: ORTHOPEDICS | Facility: CLINIC | Age: 68
End: 2023-03-16
Payer: OTHER GOVERNMENT

## 2023-03-16 VITALS — BODY MASS INDEX: 41.03 KG/M2 | WEIGHT: 261.44 LBS | HEIGHT: 67 IN

## 2023-03-16 DIAGNOSIS — M17.11 ARTHRITIS OF KNEE, RIGHT: ICD-10-CM

## 2023-03-16 DIAGNOSIS — M17.32 POST-TRAUMATIC OSTEOARTHRITIS OF LEFT KNEE: ICD-10-CM

## 2023-03-16 DIAGNOSIS — Z96.652 HISTORY OF TOTAL LEFT KNEE REPLACEMENT: ICD-10-CM

## 2023-03-16 DIAGNOSIS — Z96.652 HISTORY OF TOTAL LEFT KNEE REPLACEMENT: Primary | ICD-10-CM

## 2023-03-16 DIAGNOSIS — E66.01 MORBID OBESITY WITH BMI OF 40.0-44.9, ADULT: ICD-10-CM

## 2023-03-16 DIAGNOSIS — M17.11 ARTHRITIS OF KNEE, RIGHT: Primary | ICD-10-CM

## 2023-03-16 DIAGNOSIS — M21.061 ACQUIRED VALGUS DEFORMITY KNEE, RIGHT: ICD-10-CM

## 2023-03-16 PROCEDURE — 73564 X-RAY EXAM KNEE 4 OR MORE: CPT | Mod: TC,LT

## 2023-03-16 PROCEDURE — 73564 XR KNEE ORTHO LEFT WITH FLEXION: ICD-10-PCS | Mod: 26,LT,, | Performed by: RADIOLOGY

## 2023-03-16 PROCEDURE — 73562 XR KNEE ORTHO LEFT WITH FLEXION: ICD-10-PCS | Mod: 26,RT,, | Performed by: RADIOLOGY

## 2023-03-16 PROCEDURE — 99214 PR OFFICE/OUTPT VISIT, EST, LEVL IV, 30-39 MIN: ICD-10-PCS | Mod: S$GLB,,, | Performed by: ORTHOPAEDIC SURGERY

## 2023-03-16 PROCEDURE — 99999 PR PBB SHADOW E&M-EST. PATIENT-LVL III: ICD-10-PCS | Mod: PBBFAC,,, | Performed by: ORTHOPAEDIC SURGERY

## 2023-03-16 PROCEDURE — 73562 X-RAY EXAM OF KNEE 3: CPT | Mod: 26,RT,, | Performed by: RADIOLOGY

## 2023-03-16 PROCEDURE — 99999 PR PBB SHADOW E&M-EST. PATIENT-LVL III: CPT | Mod: PBBFAC,,, | Performed by: ORTHOPAEDIC SURGERY

## 2023-03-16 PROCEDURE — 99213 OFFICE O/P EST LOW 20 MIN: CPT | Mod: PBBFAC | Performed by: ORTHOPAEDIC SURGERY

## 2023-03-16 PROCEDURE — 99214 OFFICE O/P EST MOD 30 MIN: CPT | Mod: S$GLB,,, | Performed by: ORTHOPAEDIC SURGERY

## 2023-03-16 PROCEDURE — 73564 X-RAY EXAM KNEE 4 OR MORE: CPT | Mod: 26,LT,, | Performed by: RADIOLOGY

## 2023-03-16 NOTE — PATIENT INSTRUCTIONS
You are still taking meloxicam which is arthritis medication on a daily basis   I would like you to go have blood work to make sure it is not affecting her liver or kidneys so we will order complete metabolic panel/blood work   From now on I would like you to take meloxicam only if needed and you have discomfort of pain  The gabapentin you can still take at night which helps with the nerve pain   You are now going to the gym on her own and doing exercise program   We need to ask worker's comp to approve you for functional capacity evaluation   You claim that you would done really well with the total knee and it helped you but now we need to see what you can do in if you can return to work hopefully the functional capacity evaluation will give us better idea

## 2023-03-16 NOTE — PROGRESS NOTES
Subjective:     Patient ID: Madhuri Hickman is a 67 y.o. female.    Chief Complaint: Post-op Evaluation of the Left Knee     Mild right knee pain  HPI:  09/21/2020  64-year-old  who injured her knee approximately 16 years ago at work wears a wooden steps broke while going up.  She was eventually return to work with restrictions.  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  She did not hit the ground she held onto the truck.  She stated ibuprofen 800 mg helps but she ran out she really requesting renewal.  She has not received any injections over the last several years.  She has occasional catching locking and giving way.  Pain is 6/10 that is constant worst with stairs if she tries to go up.  Unable to squat or kneel.  Unable to stand or walk too long.  She is not using any assistive devices to ambulate.  Denies any fever or chills or shortness of breath or difficulty with chewing or swallowing loss of bowel bladder control or blurry vision or double vision or numbness or tingling in the legs or hands    11/09/2020  Steroid injection into the left knee as well as ibuprofen seems to have helped given on 09/21.  The story goes that she slipped getting out of her mail truck and slipped and grass.  She does have pre-existing arthritis in her left knee and this fall aggravated it.  She states the physical therapy is helping the injection helped the ibuprofen is helping.  Still afraid L catching locking.  Her pain is 5/10  Denies any fever or chills or shortness of breath or difficulty with chewing or swallowing loss of bowel bladder control.      01/07/2021  Patient arrived 21 min late for her appointment  Arthritis of both knees.  Posttraumatic left knee arthrosis.  She was instructed to take ibuprofen and Tylenol as needed.  Received steroid injection on 09/21/2020 with good relief of her pain  Still going to physical therapy at [facility].  She is doing slightly better now  she can get out of the tub instead of showering.  Walking 100 ft become severely painful has to sit down.  Still doing her dishes sitting down.  She is questioning my diagnosis at this point since her work says do not except arthritis as a condition.  I did tell her her injury worsened what she has her arthritis and considered posttraumatic arthritis of the knee.  This is a medical condition and has a code.  Since she is improving on ibuprofen and physical therapy and Tylenol no need to perform any injection.  Pain is 6/10    Denies any fever or chills or shortness of breath or difficulty with chewing or swallowing loss of bowel bladder control blurry vision double vision    02/26/2021  Left knee severe posttraumatic arthritis and right knee mild to moderate arthritis.  Numerous treatment including physical therapy, steroid injection 09/21/2020, hyaluronic injection 01/07/2021 and different NSAIDs did not seem to make be used difference just mild assistant.  She is using occasionally her cane to get around.  She is at a point she says she wants to proceed with her total knee replacement.  The ibuprofen does not seem to work as well.  She feels sometimes Tylenol work better.  Activities of daily living seems to be difficult even washing her dishes at this point seems to be impossible.    Denies any fever or chills or shortness of breath or difficulty with chewing or swallowing loss of bowel bladder control blurry vision double vision or loss of sense smell or taste    05/20/2021  Left knee severe posttraumatic arthritis.  Patient has denied total knee replacement.  She is taking gabapentin and meloxicam.  At this time she is not working.  Today nurse from [id number] is with her and patient agreed for her to sit and discuss.  Nurse's name Angela Camejo .  We did discuss and showed x-ray.  Discussed treatment plan which included injections of steroid different anti-inflammatories physical therapy, gabapentin..  So  far she still under review.  It eventually if that  gets denied need to go for independent medical examination  No fever no chills no shortness of breath or difficulty with chewing or swallowing loss of bowel bladder control blurry vision double vision loss sense smell or taste      07/26/2021  Left knee posttraumatic arthritis.  Things are getting worst since last time we have seen her.  Now it is locking on her in the middle of the night unable to get any relief.  She is still taking the gabapentin occasionally taking the meloxicam.  Her pain is 8/10.  Unable to stand for any .  More than 10 minutes.  She is having catching locking feeling in the knee despite not doing anything in the middle of the night also.  Unable to do steps.  Walking from the parking lot to the office was very difficult.  Using a cane to get around.  States she was scared that her knee locked on her in the middle of the night and could not do anything for it until slowly unlocked itself by the next day.  These episodes are becoming more frequent than normal.  Patient had been through anti-inflammatories, gabapentin, steroid injections, viscosupplementation injections with very mild and minimal relief.  No fever no chills no shortness of breath or difficulty with chewing or swallowing or loss of bowel bladder control blurry vision double vision loss sense smell or taste    02/14/2022  Left total knee replacement 12/29/2021.  Patient going to [facility] physical therapy in [place] but no true she.  She is taking Tylenol.  She ran out of her gabapentin and was not too sure if she needs to continue with it.  Her right knee with severe arthritis hurts her more than the left.  She is pleased with the results so far on the left knee and ambulating without any assistive devices.  She stated she was supposed to get a bath tub bench because of her tub and she needs assistance to get inside the tub and get out and never received it..  She complains  occasional numbness in the toes and on the outside of her knee.  I refreshed her memory that I did tell her that almost all total knees are numb on the outside of the knees and with time she will forget about it.  And as far as the numbness in the toes I did tell her that this is usually secondary to her back.  Pain is 5/10    03/28/2022  Left TKA 12/29/2021/worker's comp  Patient never received approval to continue to go to physical therapy at [facility] or the [facility].  Patient stated the request was not sent to the worker's Comp person at [facility] to get the approval of the above.  Pain is 6/10.  She is able to ambulate without any assistive devices.  She said she is happy she had her surgery done.  She does not think she can return to work at this time.  She had lost time from not being able to go to physical therapy.  She does ambulate without any assistive devices.    Right knee she is having pain in that but that is a different worker's comp number and we will not evaluate today    06/17/2022    Patient has left TKA 12/29/2021 worker's comp    Has not done to physical therapy had not received bath tub bench.  She is doing her own independent exercises.  She still a little weak she does ambulate without any assistive devices.  She is taking gabapentin 300 twice a day and meloxicam on a daily basis.  Something with worker's comp approval and not approving things.  I cannot and is point know what is the problem.  When patients have undergo major surgery the required physical therapy and it is part of the deal.  She is happy that she had her total knee replacement not having any pain 0/10 but still having some weakness in the leg.    09/22/2022 /checked in 10 minutes late for her appointment  Left TKA 12/29/2021/worker's comp.  Right knee arthritis is a different worker's comp claim  Patient stated the worker's comp physical therapy referral was placed on the right knee which is not the 1 we operated on.  Now  finally she starting physical therapy over the last 2 weeks at the [facility] in [facility].  She starting with the water therapy.  She is walking without any assistive devices.  She still taking the gabapentin.  Her pain is 5/10.  Now she is having a little bit more pain in the right knee compared to the left.  No fever no chills no shortness of breath difficulty with chewing swallowing loss of bowel bladder control blurry vision double vision loss sense smell or taste     She has not return to work return to work    12/12/2022    Left TKA 12/29/2021 which is a worker's comp injury different claim than the right side   Patient finally is going to [facility] physical therapy [facility].  She still taking gabapentin and meloxicam.  She is ambulating without any assistive devices today.  She said she is very happy with the results on the left knee her pain is 0/10 because she is taking the medication   No fever no chills no shortness of breath or difficulty with chewing swallowing or loss of bowel bladder control    03/16/2023   Left TKA 12/29/2021 worker's comp injury.  She has finish her therapy at [facility] .  She is doing independent exercise and going to the gym.  She still taking meloxicam and gabapentin on a daily basis.  Her pain is 4/10.  She ambulates without any assistive devices   I did tell her she needs to go for functional capacity evaluation at this time to see what she can do in order to arrange for return to work     She has been taking these medications the meloxicam and gabapentin for a while now.  We need to make sure her liver and her kidney functions are still okay.  I will order a complete metabolic panel  She has an appointment to see her primary care physician coming up soon to assess other things that she has.  Past Medical History:   Diagnosis Date    Anxiety     Arthritis     left knee    Encounter for general adult medical examination without abnormal findings 05/09/2017    Hypertension      Vitamin D deficiency      Past Surgical History:   Procedure Laterality Date    HYSTERECTOMY      total    TOTAL KNEE ARTHROPLASTY Left 12/29/2021    Procedure: ARTHROPLASTY, KNEE, TOTAL;  Surgeon: Alf Santana MD;  Location: [facility];  Service: Orthopedics;  Laterality: Left;    VAGINAL DELIVERY       Family History   Problem Relation Age of Onset    Diabetes Mother     Hypertension Mother     Heart disease Father     Hypertension Sister     Heart disease Brother     Diabetes Brother     Cancer Maternal Grandmother         breast     Social History     Socioeconomic History    Marital status:    Tobacco Use    Smoking status: Never    Smokeless tobacco: Never   Substance and Sexual Activity    Alcohol use: Yes     Comment: social; hold 72hrs prior to surgery    Drug use: No    Sexual activity: Not Currently     Medication List with Changes/Refills   Current Medications    AMLODIPINE-BENAZEPRIL 10-20MG (LOTREL) 10-20 MG PER CAPSULE    TAKE 1 CAPSULE BY MOUTH ONCE DAILY    ASPIRIN 81 MG CHEW    Take 81 mg by mouth once daily.    CHLORTHALIDONE (HYGROTEN) 25 MG TAB    TAKE 1 TABLET BY MOUTH EVERY DAY    ERGOCALCIFEROL (ERGOCALCIFEROL) 50,000 UNIT CAP    Take 1 capsule (50,000 Units total) by mouth every 7 days.    GABAPENTIN (NEURONTIN) 300 MG CAPSULE    Take 1 capsule (300 mg total) by mouth 2 (two) times daily.    JANUMET -1,000 MG TM24    TAKE 1 TABLET BY MOUTH EVERY DAY    MELOXICAM (MOBIC) 15 MG TABLET    TAKE 1 TABLET (15 MG TOTAL) BY MOUTH ONCE DAILY. TAKE WITH FOOD    ONDANSETRON (ZOFRAN-ODT) 4 MG TBDL    Dissolve 2 tablets (8 mg total) by mouth every 8 (eight) hours as needed.     Review of patient's allergies indicates:  No Known Allergies  Review of Systems   Constitutional: Negative for decreased appetite.   HENT:  Negative for tinnitus.    Eyes:  Negative for double vision.   Cardiovascular:  Negative for chest pain.   Respiratory:  Negative for wheezing.    Hematologic/Lymphatic:  Negative for bleeding problem.   Skin:  Negative for dry skin.   Musculoskeletal:  Positive for arthritis, joint pain and stiffness. Negative for back pain, falls, gout, joint swelling and neck pain.   Gastrointestinal:  Negative for abdominal pain.   Genitourinary:  Negative for bladder incontinence.   Neurological:  Negative for numbness, paresthesias and sensory change.   Psychiatric/Behavioral:  Negative for altered mental status.      Objective:   Body mass index is 40.95 kg/m².  There were no vitals filed for this visit.       General    Constitutional: She is oriented to person, place, and time. She appears well-developed.   HENT:   Head: Atraumatic.   Eyes: EOM are normal.   Cardiovascular:  Normal rate.            Pulmonary/Chest: Effort normal.   Neurological: She is alert and oriented to person, place, and time.   Psychiatric: Judgment normal.            Ambulating without any assistive devices  Pelvis is level  Bilateral hips passive motion without pain or limitations.  There is no pain to palpation over the trochanters.  Hip flexors, abductors, adductors, quads, hamstrings, ankle extensors and flexors inverters and everters all 5/5  Left knee surgical incision healed well there is no drainage.  There is no warmness to touch.  Active motion 0-130 degrees.  Stable in extension and flexion.  Slight decrease in sensation in the lateral aspect of the knee joint.  There is no defect in the patella or quadriceps tendon her quads and hamstrings are still weak at 5-/5    Able to move her ankle up and down  Right knee mild medial joint pain/moderate lateral joint pain.  No swelling.  Motion 0-120 degrees.  Collaterals and cruciates are stable.  Negative Bienvenido sign.  No defect in the patella or quadriceps tendon  Bilateral calves are soft nontender  Slight swelling around the ankle  DP 1+  PT 1+  Skin is warm to touch no obvious lesions.  Sensory intact to touch    Relevant imaging results reviewed and  interpreted by me, discussed with the patient and / or family today     X-ray 03/16/2023 left TKA in excellent alignment.  No evidence of failure.  Right knee with moderately severe arthritis on the lateral joint with marginal osteophytic change  X-ray 02/14/2022 left TKA in excellent alignment.  No evidence of fracture.  Patella midline.  Right knee with complete loss lateral joint space without fracture on AP with flexion  X-ray 12/29/2021 left TKA without evidence of fracture.  Do not have true AP and lateral views.  Staples intact  Personally reviewed the x-rays of both knees with the patient agree with the above  X-ray 09/03/2020 bilateral knees with left knee severe loss of medial joint space and osteophytic changes anteriorly and as well as laterally.  Also the right knee has some arthritic changes with not as bad loss of joint space as the left  Assessment:     Encounter Diagnoses   Name Primary?    History of total left knee replacement Yes    Arthritis of knee, right     Acquired valgus deformity knee, right     Morbid obesity with BMI of 40.0-44.9, adult         Plan:   History of total left knee replacement    Arthritis of knee, right    Acquired valgus deformity knee, right    Morbid obesity with BMI of 40.0-44.9, adult       Patient Instructions   You are still taking meloxicam which is arthritis medication on a daily basis   I would like you to go have blood work to make sure it is not affecting her liver or kidneys so we will order complete metabolic panel/blood work   From now on I would like you to take meloxicam only if needed and you have discomfort of pain  The gabapentin you can still take at night which helps with the nerve pain   You are now going to the gym on her own and doing exercise program   We need to ask worker's comp to approve you for functional capacity evaluation   You claim that you would done really well with the total knee and it helped you but now we need to see what you can  do in if you can return to work hopefully the functional capacity evaluation will give us better idea  Will re-evaluate in 3 months after she finishes the therapy/FCE    Disclaimer: This note was prepared using a voice recognition system and is likely to have sound alike errors within the text.

## 2023-05-15 ENCOUNTER — TELEPHONE (OUTPATIENT)
Dept: ORTHOPEDICS | Facility: CLINIC | Age: 68
End: 2023-05-15
Payer: OTHER GOVERNMENT

## 2023-05-15 NOTE — TELEPHONE ENCOUNTER
Called and spoke with patient - she is going to drop off paperwork today     Verbalized understanding and thankful for call

## 2023-05-15 NOTE — TELEPHONE ENCOUNTER
----- Message from Asim Lucia sent at 5/15/2023  1:08 PM CDT -----  Madhuri is requesting a call back in regards to her paperwork . Please call her back at [phone]    Thanks  cf

## 2023-05-16 ENCOUNTER — TELEPHONE (OUTPATIENT)
Dept: ORTHOPEDICS | Facility: CLINIC | Age: 68
End: 2023-05-16
Payer: OTHER GOVERNMENT

## 2023-05-16 NOTE — TELEPHONE ENCOUNTER
Returned the patient's phone call in regards to their message. Informed the patient that we did receive their paperwork from the  this morning. Patient wanted to know how long will it take for their paperwork to be completed. Informed the patient that it take about 5-7 businesses days. Informed the patient that I will give them call once their paperwork is ready. Patient verbalized understanding.        ----- Message from Alis Roberto sent at 5/16/2023  4:45 PM CDT -----  Contact: Madhuri  Madhuri would like a call back at [phone] in regards to getting an update on the paperwork she dropped off and would like to know when it will be ready for it to be picked up.  Thanks   Am

## 2023-05-19 ENCOUNTER — TELEPHONE (OUTPATIENT)
Dept: ORTHOPEDICS | Facility: CLINIC | Age: 68
End: 2023-05-19
Payer: OTHER GOVERNMENT

## 2023-05-19 NOTE — TELEPHONE ENCOUNTER
Spoke with pt regarding her paperwork. Pt was informed that someone from Dr. Santana's staff would reach out to her when her paperwork is completed.

## 2023-05-19 NOTE — TELEPHONE ENCOUNTER
----- Message from Damian Jennings sent at 5/19/2023  3:48 PM CDT -----  Contact: self  Pt is asking for an return call in reference to seeing if paper work is ready for her to  , please call back at .[phone] Thx CJ

## 2023-06-12 DIAGNOSIS — M17.32 POST-TRAUMATIC OSTEOARTHRITIS OF LEFT KNEE: ICD-10-CM

## 2023-06-12 RX ORDER — MELOXICAM 15 MG/1
TABLET ORAL
Qty: 90 TABLET | Refills: 1 | Status: SHIPPED | OUTPATIENT
Start: 2023-06-12

## 2024-02-14 ENCOUNTER — TELEPHONE (OUTPATIENT)
Dept: ORTHOPEDICS | Facility: CLINIC | Age: 69
End: 2024-02-14
Payer: OTHER GOVERNMENT

## 2024-02-14 NOTE — TELEPHONE ENCOUNTER
----- Message from Valery Galloway sent at 2/14/2024  9:37 AM CST -----  Madhuri Hickman calling regarding Patient Advice for wanting to see if the doctor received the workmen comp form to be filled out and sent back and if not, PT stated she can bring one in to have done, please contact to inform  [phone]

## 2024-02-16 ENCOUNTER — TELEPHONE (OUTPATIENT)
Dept: ORTHOPEDICS | Facility: CLINIC | Age: 69
End: 2024-02-16
Payer: OTHER GOVERNMENT

## 2024-02-16 NOTE — TELEPHONE ENCOUNTER
Returned the patient's phone call in regards to their message. Informed the patient that their paperwork is not ready for . Informed the patient that it takes 5-7 business days to be completed. Also informed the patient that Dr. Santana is not in the office today to sign their paperwork. Informed the patient that Dr. Santana also need to look into their chart and review the FCE that they had in order to determined if they can go back to work. Patient verbalized understanding.

## 2024-02-16 NOTE — TELEPHONE ENCOUNTER
----- Message from Vanesa Rueda sent at 2/16/2024  1:56 PM CST -----  Regarding: medical Advice  Contact: Madhuri  Type:  Needs Medical Advice    Who Called: Madhuri   Symptoms (please be specific):    How long has patient had these symptoms:    Pharmacy name and phone #:    Would the patient rather a call back or a response via [facility]? call  Best Call Back Number:  [phone]  Additional Information:  Madhuri is requesting a callback from the nurse today in regard to the status of her paperwork and want to know if it is ready to be picked up. She need this urgently due to disability

## 2024-03-01 ENCOUNTER — TELEPHONE (OUTPATIENT)
Dept: ORTHOPEDICS | Facility: CLINIC | Age: 69
End: 2024-03-01
Payer: OTHER GOVERNMENT

## 2024-03-01 NOTE — TELEPHONE ENCOUNTER
----- Message from Kathryn Escudero LPN sent at 2/29/2024 11:52 AM CST -----  Contact: brenda    ----- Message -----  From: Kia Renner  Sent: 2/29/2024  11:46 AM CST  To: [facility] Staff    Patient stated that she dropped paperwork off on 02/15 and she would like to know if paperwork is completed and ready for . Please call her back at [phone].      Thanks  DD

## 2024-03-04 ENCOUNTER — TELEPHONE (OUTPATIENT)
Dept: ORTHOPEDICS | Facility: CLINIC | Age: 69
End: 2024-03-04
Payer: OTHER GOVERNMENT

## 2024-03-04 NOTE — TELEPHONE ENCOUNTER
----- Message from Libertad Campa MA sent at 3/1/2024 10:18 AM CST -----  Contact: [phone]    ----- Message -----  From: Janice Begum  Sent: 3/1/2024  10:17 AM CST  To: [facility] Staff    Pt is calling f/u / adding to message that was sent this morning for workers comp paper work / status , pt states she was told she would receive call within 5-7 days(pt states time sensitive matter) pt been calling since feb 16, and no one one has returned calls, pt also is needing to know when will Dr mayen review paper work . Please call back today [phone] . Thanksdj

## 2024-04-26 DIAGNOSIS — M25.562 PAIN IN BOTH KNEES, UNSPECIFIED CHRONICITY: Primary | ICD-10-CM

## 2024-04-26 DIAGNOSIS — M25.561 PAIN IN BOTH KNEES, UNSPECIFIED CHRONICITY: Primary | ICD-10-CM

## 2024-05-01 ENCOUNTER — HOSPITAL ENCOUNTER (OUTPATIENT)
Dept: RADIOLOGY | Facility: HOSPITAL | Age: 69
Discharge: HOME | End: 2024-05-01
Attending: INTERNAL MEDICINE
Payer: OTHER GOVERNMENT

## 2024-05-01 PROCEDURE — 71046 X-RAY EXAM CHEST 2 VIEWS: CPT | Mod: 26,,, | Performed by: RADIOLOGY

## 2024-05-01 PROCEDURE — 71046 X-RAY EXAM CHEST 2 VIEWS: CPT | Mod: TC

## 2024-05-06 ENCOUNTER — HOSPITAL ENCOUNTER (OUTPATIENT)
Dept: RADIOLOGY | Facility: HOSPITAL | Age: 69
Discharge: HOME | End: 2024-05-06
Attending: ORTHOPAEDIC SURGERY
Payer: OTHER GOVERNMENT

## 2024-05-06 ENCOUNTER — TELEPHONE (OUTPATIENT)
Dept: ORTHOPEDICS | Facility: CLINIC | Age: 69
End: 2024-05-06
Payer: OTHER GOVERNMENT

## 2024-05-06 ENCOUNTER — OFFICE VISIT (OUTPATIENT)
Dept: ORTHOPEDICS | Facility: CLINIC | Age: 69
End: 2024-05-06
Payer: OTHER GOVERNMENT

## 2024-05-06 VITALS — HEIGHT: 66 IN | WEIGHT: 265.44 LBS | BODY MASS INDEX: 42.66 KG/M2

## 2024-05-06 DIAGNOSIS — Z96.652 HISTORY OF TOTAL LEFT KNEE REPLACEMENT: ICD-10-CM

## 2024-05-06 DIAGNOSIS — E66.01 MORBID OBESITY WITH BMI OF 40.0-44.9, ADULT: ICD-10-CM

## 2024-05-06 DIAGNOSIS — M17.11 ARTHRITIS OF KNEE, RIGHT: Primary | ICD-10-CM

## 2024-05-06 DIAGNOSIS — M17.11 ARTHRITIS OF KNEE, RIGHT: ICD-10-CM

## 2024-05-06 DIAGNOSIS — Z96.652 HISTORY OF TOTAL LEFT KNEE REPLACEMENT: Primary | ICD-10-CM

## 2024-05-06 DIAGNOSIS — M25.562 PAIN IN BOTH KNEES, UNSPECIFIED CHRONICITY: ICD-10-CM

## 2024-05-06 DIAGNOSIS — M21.061 ACQUIRED VALGUS DEFORMITY KNEE, RIGHT: ICD-10-CM

## 2024-05-06 DIAGNOSIS — M25.561 PAIN IN BOTH KNEES, UNSPECIFIED CHRONICITY: ICD-10-CM

## 2024-05-06 PROCEDURE — 73564 X-RAY EXAM KNEE 4 OR MORE: CPT | Mod: 26,50,, | Performed by: RADIOLOGY

## 2024-05-06 PROCEDURE — 99214 OFFICE O/P EST MOD 30 MIN: CPT | Mod: S$GLB,,, | Performed by: ORTHOPAEDIC SURGERY

## 2024-05-06 PROCEDURE — 99999 PR PBB SHADOW E&M-EST. PATIENT-LVL III: CPT | Mod: PBBFAC,,, | Performed by: ORTHOPAEDIC SURGERY

## 2024-05-06 PROCEDURE — 73564 X-RAY EXAM KNEE 4 OR MORE: CPT | Mod: TC,50

## 2024-05-06 RX ORDER — MELOXICAM 15 MG/1
15 TABLET ORAL DAILY
Qty: 90 TABLET | Refills: 3 | Status: SHIPPED | OUTPATIENT
Start: 2024-05-06

## 2024-05-06 RX ORDER — PANTOPRAZOLE SODIUM 40 MG/1
40 TABLET, DELAYED RELEASE ORAL DAILY
Qty: 90 TABLET | Refills: 3 | Status: SHIPPED | OUTPATIENT
Start: 2024-05-06 | End: 2025-05-06

## 2024-05-06 NOTE — PATIENT INSTRUCTIONS
Reviewing functional capacity evaluation today and examining the patient  Patient is markedly deconditioned at this time and I do not believe she can do 8 hours of sedentary very light work if it is available  I would like her to go back for conditioning with physical therapy at  [facility]  PT to work on both lower extremities  Continue with the gabapentin 1 at night  You may take Tylenol 650 mg 3 times a day as needed for pain  I will restart meloxicam 15 mg once a day with food  I would like to add Protonix to protect the stomach to take on a daily basis  I will see you back in 3 -4 months hopefully by then you finish her physical therapy

## 2024-05-06 NOTE — PROGRESS NOTES
Subjective:     Patient ID: Madhuri Hickman is a 68 y.o. female.    Chief Complaint: Pain of the Left Knee     Mild right knee pain  HPI:  09/21/2020  64-year-old  who injured her knee approximately 16 years ago at work wears a wooden steps broke while going up.  She was eventually return to work with restrictions.  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  She did not hit the ground she held onto the truck.  She stated ibuprofen 800 mg helps but she ran out she really requesting renewal.  She has not received any injections over the last several years.  She has occasional catching locking and giving way.  Pain is 6/10 that is constant worst with stairs if she tries to go up.  Unable to squat or kneel.  Unable to stand or walk too long.  She is not using any assistive devices to ambulate.  Denies any fever or chills or shortness of breath or difficulty with chewing or swallowing loss of bowel bladder control or blurry vision or double vision or numbness or tingling in the legs or hands    11/09/2020  Steroid injection into the left knee as well as ibuprofen seems to have helped given on 09/21.  The story goes that she slipped getting out of her mail truck and slipped and grass.  She does have pre-existing arthritis in her left knee and this fall aggravated it.  She states the physical therapy is helping the injection helped the ibuprofen is helping.  Still afraid L catching locking.  Her pain is 5/10  Denies any fever or chills or shortness of breath or difficulty with chewing or swallowing loss of bowel bladder control.      01/07/2021  Patient arrived 21 min late for her appointment  Arthritis of both knees.  Posttraumatic left knee arthrosis.  She was instructed to take ibuprofen and Tylenol as needed.  Received steroid injection on 09/21/2020 with good relief of her pain  Still going to physical therapy at [facility].  She is doing slightly better now she can get  out of the tub instead of showering.  Walking 100 ft become severely painful has to sit down.  Still doing her dishes sitting down.  She is questioning my diagnosis at this point since her work says do not except arthritis as a condition.  I did tell her her injury worsened what she has her arthritis and considered posttraumatic arthritis of the knee.  This is a medical condition and has a code.  Since she is improving on ibuprofen and physical therapy and Tylenol no need to perform any injection.  Pain is 6/10    Denies any fever or chills or shortness of breath or difficulty with chewing or swallowing loss of bowel bladder control blurry vision double vision    02/26/2021  Left knee severe posttraumatic arthritis and right knee mild to moderate arthritis.  Numerous treatment including physical therapy, steroid injection 09/21/2020, hyaluronic injection 01/07/2021 and different NSAIDs did not seem to make be used difference just mild assistant.  She is using occasionally her cane to get around.  She is at a point she says she wants to proceed with her total knee replacement.  The ibuprofen does not seem to work as well.  She feels sometimes Tylenol work better.  Activities of daily living seems to be difficult even washing her dishes at this point seems to be impossible.    Denies any fever or chills or shortness of breath or difficulty with chewing or swallowing loss of bowel bladder control blurry vision double vision or loss of sense smell or taste    05/20/2021  Left knee severe posttraumatic arthritis.  Patient has denied total knee replacement.  She is taking gabapentin and meloxicam.  At this time she is not working.  Today nurse from [employer] is with her and patient agreed for her to sit and discuss.  Nurse's name Angela Camejo .  We did discuss and showed x-ray.  Discussed treatment plan which included injections of steroid different anti-inflammatories physical therapy, gabapentin..  So far she still  under review.  It eventually if that  gets denied need to go for independent medical examination  No fever no chills no shortness of breath or difficulty with chewing or swallowing loss of bowel bladder control blurry vision double vision loss sense smell or taste      07/26/2021  Left knee posttraumatic arthritis.  Things are getting worst since last time we have seen her.  Now it is locking on her in the middle of the night unable to get any relief.  She is still taking the gabapentin occasionally taking the meloxicam.  Her pain is 8/10.  Unable to stand for any .  More than 10 minutes.  She is having catching locking feeling in the knee despite not doing anything in the middle of the night also.  Unable to do steps.  Walking from the parking lot to the office was very difficult.  Using a cane to get around.  States she was scared that her knee locked on her in the middle of the night and could not do anything for it until slowly unlocked itself by the next day.  These episodes are becoming more frequent than normal.  Patient had been through anti-inflammatories, gabapentin, steroid injections, viscosupplementation injections with very mild and minimal relief.  No fever no chills no shortness of breath or difficulty with chewing or swallowing or loss of bowel bladder control blurry vision double vision loss sense smell or taste    02/14/2022  Left total knee replacement 12/29/2021.  Patient going to [facility] physical therapy in [place] but no true she.  She is taking Tylenol.  She ran out of her gabapentin and was not too sure if she needs to continue with it.  Her right knee with severe arthritis hurts her more than the left.  She is pleased with the results so far on the left knee and ambulating without any assistive devices.  She stated she was supposed to get a bath tub bench because of her tub and she needs assistance to get inside the tub and get out and never received it..  She complains occasional numbness in  the toes and on the outside of her knee.  I refreshed her memory that I did tell her that almost all total knees are numb on the outside of the knees and with time she will forget about it.  And as far as the numbness in the toes I did tell her that this is usually secondary to her back.  Pain is 5/10    03/28/2022  Left TKA 12/29/2021/worker's comp  Patient never received approval to continue to go to physical therapy at [facility] or the [facility].  Patient stated the request was not sent to the worker's Comp person at [facility] to get the approval of the above.  Pain is 6/10.  She is able to ambulate without any assistive devices.  She said she is happy she had her surgery done.  She does not think she can return to work at this time.  She had lost time from not being able to go to physical therapy.  She does ambulate without any assistive devices.    Right knee she is having pain in that but that is a different worker's comp number and we will not evaluate today    06/17/2022    Patient has left TKA 12/29/2021 worker's comp    Has not done to physical therapy had not received bath tub bench.  She is doing her own independent exercises.  She still a little weak she does ambulate without any assistive devices.  She is taking gabapentin 300 twice a day and meloxicam on a daily basis.  Something with worker's comp approval and not approving things.  I cannot and is point know what is the problem.  When patients have undergo major surgery the required physical therapy and it is part of the deal.  She is happy that she had her total knee replacement not having any pain 0/10 but still having some weakness in the leg.    09/22/2022 /checked in 10 minutes late for her appointment  Left TKA 12/29/2021/worker's comp.  Right knee arthritis is a different worker's comp claim  Patient stated the worker's comp physical therapy referral was placed on the right knee which is not the 1 we operated on.  Now finally she starting  physical therapy over the last 2 weeks at the [facility] in [facility].  She starting with the water therapy.  She is walking without any assistive devices.  She still taking the gabapentin.  Her pain is 5/10.  Now she is having a little bit more pain in the right knee compared to the left.  No fever no chills no shortness of breath difficulty with chewing swallowing loss of bowel bladder control blurry vision double vision loss sense smell or taste     She has not return to work return to work    12/12/2022    Left TKA 12/29/2021 which is a worker's comp injury different claim than the right side   Patient finally is going to [facility] physical therapy [facility].  She still taking gabapentin and meloxicam.  She is ambulating without any assistive devices today.  She said she is very happy with the results on the left knee her pain is 0/10 because she is taking the medication   No fever no chills no shortness of breath or difficulty with chewing swallowing or loss of bowel bladder control    03/16/2023   Left TKA 12/29/2021 worker's comp injury.  She has finish her therapy at [facility] .  She is doing independent exercise and going to the gym.  She still taking meloxicam and gabapentin on a daily basis.  Her pain is 4/10.  She ambulates without any assistive devices   I did tell her she needs to go for functional capacity evaluation at this time to see what she can do in order to arrange for return to work     She has been taking these medications the meloxicam and gabapentin for a while now.  We need to make sure her liver and her kidney functions are still okay.  I will order a complete metabolic panel  She has an appointment to see her primary care physician coming up soon to assess other things that she has.    05/06/2024  Left TKA 12/29/2021 eventually she had a functional capacity evaluation that showed that she is markedly deconditioned.  Only could do sedentary type of work.  I went over it with the patient.   She is still feeling weakness in the legs and went over the medications.  No fever no chills no shortness breath or pain is still at 4/10.  I did discuss that total knee replacement is not perfect it is 80% successful in decreasing pain and increasing function.  She stated she is definitely better than before having surgery  Past Medical History:   Diagnosis Date    Anxiety     Arthritis     left knee    Encounter for general adult medical examination without abnormal findings 05/09/2017    Hypertension     Vitamin D deficiency      Past Surgical History:   Procedure Laterality Date    HYSTERECTOMY      total    TOTAL KNEE ARTHROPLASTY Left 12/29/2021    Procedure: ARTHROPLASTY, KNEE, TOTAL;  Surgeon: Alf Santana MD;  Location: [facility];  Service: Orthopedics;  Laterality: Left;    VAGINAL DELIVERY       Family History   Problem Relation Name Age of Onset    Diabetes Mother      Hypertension Mother      Heart disease Father      Hypertension Sister      Heart disease Brother      Diabetes Brother      Cancer Maternal Grandmother          breast     Social History     Socioeconomic History    Marital status:    Tobacco Use    Smoking status: Never    Smokeless tobacco: Never   Substance and Sexual Activity    Alcohol use: Yes     Comment: social; hold 72hrs prior to surgery    Drug use: No    Sexual activity: Not Currently     Medication List with Changes/Refills   New Medications    MELOXICAM (MOBIC) 15 MG TABLET    Take 1 tablet (15 mg total) by mouth once daily. Take with food    PANTOPRAZOLE (PROTONIX) 40 MG TABLET    Take 1 tablet (40 mg total) by mouth once daily.   Current Medications    AMLODIPINE-BENAZEPRIL 10-20MG (LOTREL) 10-20 MG PER CAPSULE    TAKE 1 CAPSULE BY MOUTH ONCE DAILY    ERGOCALCIFEROL (ERGOCALCIFEROL) 50,000 UNIT CAP    TAKE 1 CAPSULE BY MOUTH ONE TIME PER WEEK    GABAPENTIN (NEURONTIN) 300 MG CAPSULE    Take 1 capsule (300 mg total) by mouth 2 (two) times daily.    MELOXICAM  (MOBIC) 15 MG TABLET    TAKE 1 TABLET (15 MG TOTAL) BY MOUTH EVERY DAY WITH FOOD    ONDANSETRON (ZOFRAN-ODT) 4 MG TBDL    Dissolve 2 tablets (8 mg total) by mouth every 8 (eight) hours as needed.    SITAGLIPTAN-METFORMIN (JANUMET XR) 100-1,000 MG TM24    Take 1 tablet by mouth Daily.     Review of patient's allergies indicates:  No Known Allergies  Review of Systems   Constitutional: Negative for decreased appetite.   HENT:  Negative for tinnitus.    Eyes:  Negative for double vision.   Cardiovascular:  Negative for chest pain.   Respiratory:  Negative for wheezing.    Hematologic/Lymphatic: Negative for bleeding problem.   Skin:  Negative for dry skin.   Musculoskeletal:  Positive for arthritis, joint pain and stiffness. Negative for back pain, falls, gout, joint swelling and neck pain.   Gastrointestinal:  Negative for abdominal pain.   Genitourinary:  Negative for bladder incontinence.   Neurological:  Negative for numbness, paresthesias and sensory change.   Psychiatric/Behavioral:  Negative for altered mental status.        Objective:   Body mass index is 42.84 kg/m².  There were no vitals filed for this visit.       General    Constitutional: She is oriented to person, place, and time. She appears well-developed.   HENT:   Head: Atraumatic.   Eyes: EOM are normal.   Cardiovascular:  Normal rate.            Pulmonary/Chest: Effort normal.   Neurological: She is alert and oriented to person, place, and time.   Psychiatric: Judgment normal.              Ambulating without any assistive devices  Pelvis is level  Bilateral hips passive motion without pain or limitations.  There is no pain to palpation over the trochanters.  Hip flexors, abductors, adductors, quads, hamstrings, ankle extensors and flexors inverters and everters all 5/5  Left knee surgical incision healed well there is no drainage.  There is no warmness to touch.  Active motion 0-130 degrees.  Stable in extension and flexion.  Slight decrease in  sensation in the lateral aspect of the knee joint.  There is no defect in the patella or quadriceps tendon her quads and hamstrings are still weak at 5-/5    Able to move her ankle up and down  Right knee mild medial joint pain/moderate lateral joint pain.  No swelling.  Motion 0-120 degrees.  Collaterals and cruciates are stable.  Negative Bienvenido sign.  No defect in the patella or quadriceps tendon  Bilateral calves are soft nontender  Slight swelling around the ankle  DP 1+  PT 1+  Skin is warm to touch no obvious lesions.  Sensory intact to touch    Relevant imaging results reviewed and interpreted by me, discussed with the patient and / or family today   X-ray 05/06/2024 left TKA still in excellent alignment patella midline no evidence of failure.  Right knee with moderate arthritis in the lateral joint and moderate lateral joint narrowing and small marginal osteophyte  X-ray 03/16/2023 left TKA in excellent alignment.  No evidence of failure.  Right knee with moderately severe arthritis on the lateral joint with marginal osteophytic change  X-ray 02/14/2022 left TKA in excellent alignment.  No evidence of fracture.  Patella midline.  Right knee with complete loss lateral joint space without fracture on AP with flexion  X-ray 12/29/2021 left TKA without evidence of fracture.  Do not have true AP and lateral views.  Staples intact  Personally reviewed the x-rays of both knees with the patient agree with the above  X-ray 09/03/2020 bilateral knees with left knee severe loss of medial joint space and osteophytic changes anteriorly and as well as laterally.  Also the right knee has some arthritic changes with not as bad loss of joint space as the left  Assessment:     Encounter Diagnoses   Name Primary?    Arthritis of knee, right Yes    Acquired valgus deformity knee, right     History of total left knee replacement     Morbid obesity with BMI of 40.0-44.9, adult         Plan:   Arthritis of knee,  right    Acquired valgus deformity knee, right    History of total left knee replacement  -     pantoprazole (PROTONIX) 40 MG tablet; Take 1 tablet (40 mg total) by mouth once daily.  Dispense: 90 tablet; Refill: 3  -     meloxicam (MOBIC) 15 MG tablet; Take 1 tablet (15 mg total) by mouth once daily. Take with food  Dispense: 90 tablet; Refill: 3    Morbid obesity with BMI of 40.0-44.9, adult         Patient Instructions   Reviewing functional capacity evaluation today and examining the patient  Patient is markedly deconditioned at this time and I do not believe she can do 8 hours of sedentary very light work if it is available  I would like her to go back for conditioning with physical therapy at  [facility]  PT to work on both lower extremities  Continue with the gabapentin 1 at night  You may take Tylenol 650 mg 3 times a day as needed for pain  I will restart meloxicam 15 mg once a day with food  I would like to add Protonix to protect the stomach to take on a daily basis  I will see you back in 3 -4 months hopefully by then you finish her physical therapy        Will re-evaluate in 3 months after she finishes the therapy/[facility]    Disclaimer: This note was prepared using a voice recognition system and is likely to have sound alike errors within the text.

## 2024-09-05 ENCOUNTER — TELEPHONE (OUTPATIENT)
Dept: ORTHOPEDICS | Facility: CLINIC | Age: 69
End: 2024-09-05
Payer: OTHER GOVERNMENT

## 2024-09-05 NOTE — TELEPHONE ENCOUNTER
Called and spoke with patient - informed her that we needed an updated office visit before we could send an order for more physical therapy - patient has appt on 9/9/24 - verbalized understanding and thankful for call

## 2024-09-05 NOTE — TELEPHONE ENCOUNTER
----- Message from Virginia Wright sent at 9/5/2024 12:30 PM CDT -----  Type:  Patient Returning Call    Who Called:Madhuri  Does the patient know what this is regarding?:Need a callback from office to extend therapy  Would the patient rather a call back or a response via [facility]? callback  Best Call Back Number:[phone]  Additional Information: Need order sent [facility] to [facility] to be extended it has already run out

## 2024-09-09 ENCOUNTER — TELEPHONE (OUTPATIENT)
Dept: ORTHOPEDICS | Facility: CLINIC | Age: 69
End: 2024-09-09
Payer: OTHER GOVERNMENT

## 2024-09-09 ENCOUNTER — OFFICE VISIT (OUTPATIENT)
Dept: ORTHOPEDICS | Facility: CLINIC | Age: 69
End: 2024-09-09
Payer: OTHER GOVERNMENT

## 2024-09-09 VITALS — HEIGHT: 66 IN | BODY MASS INDEX: 41.13 KG/M2 | WEIGHT: 255.94 LBS

## 2024-09-09 DIAGNOSIS — E66.01 MORBID OBESITY WITH BMI OF 40.0-44.9, ADULT: ICD-10-CM

## 2024-09-09 DIAGNOSIS — M17.11 ARTHRITIS OF KNEE, RIGHT: Primary | ICD-10-CM

## 2024-09-09 DIAGNOSIS — Z96.652 HISTORY OF TOTAL LEFT KNEE REPLACEMENT: ICD-10-CM

## 2024-09-09 DIAGNOSIS — M21.061 ACQUIRED VALGUS DEFORMITY KNEE, RIGHT: ICD-10-CM

## 2024-09-09 PROCEDURE — 99213 OFFICE O/P EST LOW 20 MIN: CPT | Mod: S$GLB,,, | Performed by: ORTHOPAEDIC SURGERY

## 2024-09-09 PROCEDURE — 99999 PR PBB SHADOW E&M-EST. PATIENT-LVL III: CPT | Mod: PBBFAC,,, | Performed by: ORTHOPAEDIC SURGERY

## 2024-09-09 RX ORDER — PENICILLIN V POTASSIUM 500 MG/1
500 TABLET, FILM COATED ORAL
COMMUNITY
Start: 2024-09-04 | End: 2024-09-14

## 2024-09-09 RX ORDER — ONDANSETRON 4 MG/1
4 TABLET, ORALLY DISINTEGRATING ORAL ONCE
COMMUNITY

## 2024-09-09 RX ORDER — PANTOPRAZOLE SODIUM 40 MG/1
40 TABLET, DELAYED RELEASE ORAL
COMMUNITY
Start: 2024-08-04

## 2024-09-09 NOTE — PROGRESS NOTES
Subjective:     Patient ID: Madhuri Hickman is a 68 y.o. female.    Chief Complaint: Pain of the Left Knee     Mild right knee pain  HPI:  09/21/2020  64-year-old  who injured her knee approximately 16 years ago at work wears a wooden steps broke while going up.  She was eventually return to work with restrictions.  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  She did not hit the ground she held onto the truck.  She stated ibuprofen 800 mg helps but she ran out she really requesting renewal.  She has not received any injections over the last several years.  She has occasional catching locking and giving way.  Pain is 6/10 that is constant worst with stairs if she tries to go up.  Unable to squat or kneel.  Unable to stand or walk too long.  She is not using any assistive devices to ambulate.  Denies any fever or chills or shortness of breath or difficulty with chewing or swallowing loss of bowel bladder control or blurry vision or double vision or numbness or tingling in the legs or hands    11/09/2020  Steroid injection into the left knee as well as ibuprofen seems to have helped given on 09/21.  The story goes that she slipped getting out of her mail truck and slipped and grass.  She does have pre-existing arthritis in her left knee and this fall aggravated it.  She states the physical therapy is helping the injection helped the ibuprofen is helping.  Still afraid L catching locking.  Her pain is 5/10  Denies any fever or chills or shortness of breath or difficulty with chewing or swallowing loss of bowel bladder control.      01/07/2021  Patient arrived 21 min late for her appointment  Arthritis of both knees.  Posttraumatic left knee arthrosis.  She was instructed to take ibuprofen and Tylenol as needed.  Received steroid injection on 09/21/2020 with good relief of her pain  Still going to physical therapy at [facility].  She is doing slightly better now she can get  out of the tub instead of showering.  Walking 100 ft become severely painful has to sit down.  Still doing her dishes sitting down.  She is questioning my diagnosis at this point since her work says do not except arthritis as a condition.  I did tell her her injury worsened what she has her arthritis and considered posttraumatic arthritis of the knee.  This is a medical condition and has a code.  Since she is improving on ibuprofen and physical therapy and Tylenol no need to perform any injection.  Pain is 6/10    Denies any fever or chills or shortness of breath or difficulty with chewing or swallowing loss of bowel bladder control blurry vision double vision    02/26/2021  Left knee severe posttraumatic arthritis and right knee mild to moderate arthritis.  Numerous treatment including physical therapy, steroid injection 09/21/2020, hyaluronic injection 01/07/2021 and different NSAIDs did not seem to make be used difference just mild assistant.  She is using occasionally her cane to get around.  She is at a point she says she wants to proceed with her total knee replacement.  The ibuprofen does not seem to work as well.  She feels sometimes Tylenol work better.  Activities of daily living seems to be difficult even washing her dishes at this point seems to be impossible.    Denies any fever or chills or shortness of breath or difficulty with chewing or swallowing loss of bowel bladder control blurry vision double vision or loss of sense smell or taste    05/20/2021  Left knee severe posttraumatic arthritis.  Patient has denied total knee replacement.  She is taking gabapentin and meloxicam.  At this time she is not working.  Today nurse from [employer] is with her and patient agreed for her to sit and discuss.  Nurse's name Angela Camejo .  We did discuss and showed x-ray.  Discussed treatment plan which included injections of steroid different anti-inflammatories physical therapy, gabapentin..  So far she still  under review.  It eventually if that  gets denied need to go for independent medical examination  No fever no chills no shortness of breath or difficulty with chewing or swallowing loss of bowel bladder control blurry vision double vision loss sense smell or taste      07/26/2021  Left knee posttraumatic arthritis.  Things are getting worst since last time we have seen her.  Now it is locking on her in the middle of the night unable to get any relief.  She is still taking the gabapentin occasionally taking the meloxicam.  Her pain is 8/10.  Unable to stand for any .  More than 10 minutes.  She is having catching locking feeling in the knee despite not doing anything in the middle of the night also.  Unable to do steps.  Walking from the parking lot to the office was very difficult.  Using a cane to get around.  States she was scared that her knee locked on her in the middle of the night and could not do anything for it until slowly unlocked itself by the next day.  These episodes are becoming more frequent than normal.  Patient had been through anti-inflammatories, gabapentin, steroid injections, viscosupplementation injections with very mild and minimal relief.  No fever no chills no shortness of breath or difficulty with chewing or swallowing or loss of bowel bladder control blurry vision double vision loss sense smell or taste    02/14/2022  Left total knee replacement 12/29/2021.  Patient going to [facility] physical therapy in [place] but no true she.  She is taking Tylenol.  She ran out of her gabapentin and was not too sure if she needs to continue with it.  Her right knee with severe arthritis hurts her more than the left.  She is pleased with the results so far on the left knee and ambulating without any assistive devices.  She stated she was supposed to get a bath tub bench because of her tub and she needs assistance to get inside the tub and get out and never received it..  She complains occasional numbness in  the toes and on the outside of her knee.  I refreshed her memory that I did tell her that almost all total knees are numb on the outside of the knees and with time she will forget about it.  And as far as the numbness in the toes I did tell her that this is usually secondary to her back.  Pain is 5/10    03/28/2022  Left TKA 12/29/2021/worker's comp  Patient never received approval to continue to go to physical therapy at [facility] or the [facility].  Patient stated the request was not sent to the worker's Comp person at [facility] to get the approval of the above.  Pain is 6/10.  She is able to ambulate without any assistive devices.  She said she is happy she had her surgery done.  She does not think she can return to work at this time.  She had lost time from not being able to go to physical therapy.  She does ambulate without any assistive devices.    Right knee she is having pain in that but that is a different worker's comp number and we will not evaluate today    06/17/2022    Patient has left TKA 12/29/2021 worker's comp    Has not done to physical therapy had not received bath tub bench.  She is doing her own independent exercises.  She still a little weak she does ambulate without any assistive devices.  She is taking gabapentin 300 twice a day and meloxicam on a daily basis.  Something with worker's comp approval and not approving things.  I cannot and is point know what is the problem.  When patients have undergo major surgery the required physical therapy and it is part of the deal.  She is happy that she had her total knee replacement not having any pain 0/10 but still having some weakness in the leg.    09/22/2022 /checked in 10 minutes late for her appointment  Left TKA 12/29/2021/worker's comp.  Right knee arthritis is a different worker's comp claim  Patient stated the worker's comp physical therapy referral was placed on the right knee which is not the 1 we operated on.  Now finally she starting  physical therapy over the last 2 weeks at the [facility] in [facility].  She starting with the water therapy.  She is walking without any assistive devices.  She still taking the gabapentin.  Her pain is 5/10.  Now she is having a little bit more pain in the right knee compared to the left.  No fever no chills no shortness of breath difficulty with chewing swallowing loss of bowel bladder control blurry vision double vision loss sense smell or taste     She has not return to work return to work    12/12/2022    Left TKA 12/29/2021 which is a worker's comp injury different claim than the right side   Patient finally is going to [facility] physical therapy [facility].  She still taking gabapentin and meloxicam.  She is ambulating without any assistive devices today.  She said she is very happy with the results on the left knee her pain is 0/10 because she is taking the medication   No fever no chills no shortness of breath or difficulty with chewing swallowing or loss of bowel bladder control    03/16/2023   Left TKA 12/29/2021 worker's comp injury.  She has finish her therapy at [facility] .  She is doing independent exercise and going to the gym.  She still taking meloxicam and gabapentin on a daily basis.  Her pain is 4/10.  She ambulates without any assistive devices   I did tell her she needs to go for functional capacity evaluation at this time to see what she can do in order to arrange for return to work     She has been taking these medications the meloxicam and gabapentin for a while now.  We need to make sure her liver and her kidney functions are still okay.  I will order a complete metabolic panel  She has an appointment to see her primary care physician coming up soon to assess other things that she has.    05/06/2024  Left TKA 12/29/2021 eventually she had a functional capacity evaluation that showed that she is markedly deconditioned.  Only could do sedentary type of work.  I went over it with the patient.   She is still feeling weakness in the legs and went over the medications.  No fever no chills no shortness breath or pain is still at 4/10.  I did discuss that total knee replacement is not perfect it is 80% successful in decreasing pain and increasing function.  She stated she is definitely better than before having surgery You     09/09/2024   Left TKA 12/29/2021, [facility]U shown that she is markedly deconditioned and she needs extensive physical therapy.  They working with the night now in the pool and doing aquatic to allow her to be able to get more strengthen less loading on the joints.  She is not there yet.  And I think she can do 8 hours of sedentary type of work.  She is taking meloxicam and now she is taking gabapentin which should help with nerve pain.  She recently acquired shingles to the right upper extremity and I did tell her that she should ask primary care about the vaccine.  The vaccine helps real preventing reoccurrence.  She is still trying to lose weight but BMI is 41.31  I did tell the patient that total knee replacement is not perfect it is 80% helpful however there is 20% of the time where things do not work out well and still complain of certain problems.  There is arthritis in the right knee  Past Medical History:   Diagnosis Date    Anxiety     Arthritis     left knee    Encounter for general adult medical examination without abnormal findings 05/09/2017    Hypertension     Vitamin D deficiency      Past Surgical History:   Procedure Laterality Date    HYSTERECTOMY      total    TOTAL KNEE ARTHROPLASTY Left 12/29/2021    Procedure: ARTHROPLASTY, KNEE, TOTAL;  Surgeon: Alf Santana MD;  Location: [facility];  Service: Orthopedics;  Laterality: Left;    VAGINAL DELIVERY       Family History   Problem Relation Name Age of Onset    Diabetes Mother      Hypertension Mother      Heart disease Father      Hypertension Sister      Heart disease Brother      Diabetes Brother      Cancer Maternal  Grandmother          breast     Social History     Socioeconomic History    Marital status:    Tobacco Use    Smoking status: Never    Smokeless tobacco: Never   Substance and Sexual Activity    Alcohol use: Yes     Comment: social; hold 72hrs prior to surgery    Drug use: No    Sexual activity: Not Currently     Medication List with Changes/Refills   Current Medications    AMLODIPINE-BENAZEPRIL 10-20MG (LOTREL) 10-20 MG PER CAPSULE    TAKE 1 CAPSULE BY MOUTH EVERY DAY    CHLORTHALIDONE (HYGROTEN) 25 MG TAB    Take 25 mg by mouth.    ERGOCALCIFEROL (ERGOCALCIFEROL) 50,000 UNIT CAP    TAKE 1 CAPSULE BY MOUTH ONE TIME PER WEEK    GABAPENTIN (NEURONTIN) 300 MG CAPSULE    Take 1 capsule (300 mg total) by mouth 3 (three) times daily.    HYDROXYZINE HCL (ATARAX) 50 MG TABLET    Take 1 tablet (50 mg total) by mouth 3 (three) times daily as needed for Itching.    JANUMET -1,000 MG TM24    TAKE 1 TABLET BY MOUTH EVERY DAY    MELOXICAM (MOBIC) 15 MG TABLET    Take 1 tablet (15 mg total) by mouth once daily. Take with food    MUPIROCIN (BACTROBAN) 2 % OINTMENT    Apply topically 3 (three) times daily.    ONDANSETRON (ZOFRAN-ODT) 4 MG TBDL    Take 4 mg by mouth once.    PANTOPRAZOLE (PROTONIX) 40 MG TABLET    Take 40 mg by mouth.    PENICILLIN V POTASSIUM (VEETID) 500 MG TABLET    Take 500 mg by mouth.     Review of patient's allergies indicates:  No Known Allergies  Review of Systems   Constitutional: Negative for decreased appetite.   HENT:  Negative for tinnitus.    Eyes:  Negative for double vision.   Cardiovascular:  Negative for chest pain.   Respiratory:  Negative for wheezing.    Hematologic/Lymphatic: Negative for bleeding problem.   Skin:  Negative for dry skin.   Musculoskeletal:  Positive for arthritis, joint pain and stiffness. Negative for back pain, falls, gout, joint swelling and neck pain.   Gastrointestinal:  Negative for abdominal pain.   Genitourinary:  Negative for bladder incontinence.    Neurological:  Negative for numbness, paresthesias and sensory change.   Psychiatric/Behavioral:  Negative for altered mental status.        Objective:   Body mass index is 41.31 kg/m².  There were no vitals filed for this visit.       General    Constitutional: She is oriented to person, place, and time. She appears well-developed.   HENT:   Head: Atraumatic.   Eyes: EOM are normal.   Cardiovascular:  Normal rate.            Pulmonary/Chest: Effort normal.   Neurological: She is alert and oriented to person, place, and time.   Psychiatric: Judgment normal.              Ambulating without any assistive devices/there is a waddling gait  Pelvis is level  Bilateral hips passive motion without pain or limitations.  There is no pain to palpation over the trochanters.  Hip flexors, abductors, adductors, quads, hamstrings, ankle extensors and flexors inverters and everters all 5/5  Left knee surgical incision healed well there is no drainage.  There is no warmness to touch.  Active motion 0-130 degrees.  Stable in extension and flexion.  Slight decrease in sensation in the lateral aspect of the knee joint.  There is no defect in the patella or quadriceps tendon her quads and hamstrings are still weak at 4/5    Able to move her ankle up and down  Right knee mild medial joint pain/moderate lateral joint pain.  No swelling.  Motion 0-120 degrees.  Collaterals and cruciates are stable.  Negative Bienvenido sign.  No defect in the patella or quadriceps tendon  Bilateral calves are soft nontender  Slight swelling around the ankle  DP 1+  PT 1+  Skin is warm to touch no obvious lesions.  Sensory intact to touch    Relevant imaging results reviewed and interpreted by me, discussed with the patient and / or family today   X-ray 05/06/2024 left TKA still in excellent alignment patella midline no evidence of failure.  [employer] posterior stabilized knee system.  Right knee with moderate arthritis in the lateral joint and  moderate lateral joint narrowing and small marginal osteophyte  X-ray 03/16/2023 left TKA in excellent alignment.  No evidence of failure.  Right knee with moderately severe arthritis on the lateral joint with marginal osteophytic change  X-ray 02/14/2022 left TKA in excellent alignment.  No evidence of fracture.  Patella midline.  Right knee with complete loss lateral joint space without fracture on AP with flexion  X-ray 12/29/2021 left TKA without evidence of fracture.  Do not have true AP and lateral views.  Staples intact  Personally reviewed the x-rays of both knees with the patient agree with the above  X-ray 09/03/2020 bilateral knees with left knee severe loss of medial joint space and osteophytic changes anteriorly and as well as laterally.  Also the right knee has some arthritic changes with not as bad loss of joint space as the left  Assessment:     Encounter Diagnoses   Name Primary?    Arthritis of knee, right Yes    Acquired valgus deformity knee, right     History of total left knee replacement     Morbid obesity with BMI of 40.0-44.9, adult         Plan:   Arthritis of knee, right    Acquired valgus deformity knee, right    History of total left knee replacement    Morbid obesity with BMI of 40.0-44.9, adult         Patient Instructions   Are seeing some improvement with the left knee with physical therapy at [facility]  They are doing aquatic exercises with the you  You are taking meloxicam  You are taking gabapentin   Recently you had contracted shingles and was very painful in the right upper extremity  At this time I think your problem is permanent with the knee despite all the exercises you still having some problem   Total knee replacement is not perfect it is 80% helpful but 20% of the time you still will have problems  I do not think at this time you can still go to do 8 hours sedentary work  I would like you to continue with physical therapy at [facility]/we will renew  See you back in around 4  months      Disclaimer: This note was prepared using a voice recognition system and is likely to have sound alike errors within the text.

## 2024-09-09 NOTE — PATIENT INSTRUCTIONS
Are seeing some improvement with the left knee with physical therapy at [facility]  They are doing aquatic exercises with the you  You are taking meloxicam  You are taking gabapentin   Recently you had contracted shingles and was very painful in the right upper extremity  At this time I think your problem is permanent with the knee despite all the exercises you still having some problem   Total knee replacement is not perfect it is 80% helpful but 20% of the time you still will have problems  I do not think at this time you can still go to do 8 hours sedentary work  I would like you to continue with physical therapy at [facility]/we will renew  See you back in around 4 months

## 2025-01-09 ENCOUNTER — OFFICE VISIT (OUTPATIENT)
Dept: ORTHOPEDICS | Facility: CLINIC | Age: 70
End: 2025-01-09
Payer: OTHER GOVERNMENT

## 2025-01-09 ENCOUNTER — OFFICE VISIT (OUTPATIENT)
Dept: DERMATOLOGY | Facility: CLINIC | Age: 70
End: 2025-01-09
Payer: OTHER GOVERNMENT

## 2025-01-09 VITALS — WEIGHT: 257.25 LBS | BODY MASS INDEX: 41.34 KG/M2 | HEIGHT: 66 IN

## 2025-01-09 DIAGNOSIS — E66.01 MORBID OBESITY WITH BMI OF 40.0-44.9, ADULT: ICD-10-CM

## 2025-01-09 DIAGNOSIS — Z96.652 HISTORY OF TOTAL LEFT KNEE REPLACEMENT: Primary | ICD-10-CM

## 2025-01-09 DIAGNOSIS — M17.11 ARTHRITIS OF KNEE, RIGHT: Primary | ICD-10-CM

## 2025-01-09 DIAGNOSIS — M17.32 POST-TRAUMATIC OSTEOARTHRITIS OF LEFT KNEE: ICD-10-CM

## 2025-01-09 DIAGNOSIS — M25.562 LEFT KNEE PAIN, UNSPECIFIED CHRONICITY: Primary | ICD-10-CM

## 2025-01-09 DIAGNOSIS — L29.9 PRURITUS: Primary | ICD-10-CM

## 2025-01-09 DIAGNOSIS — M21.061 ACQUIRED VALGUS DEFORMITY KNEE, RIGHT: ICD-10-CM

## 2025-01-09 DIAGNOSIS — Z96.652 HISTORY OF TOTAL LEFT KNEE REPLACEMENT: ICD-10-CM

## 2025-01-09 DIAGNOSIS — M17.11 ARTHRITIS OF KNEE, RIGHT: ICD-10-CM

## 2025-01-09 PROCEDURE — 99999 PR PBB SHADOW E&M-EST. PATIENT-LVL III: CPT | Mod: PBBFAC,,, | Performed by: ORTHOPAEDIC SURGERY

## 2025-01-09 PROCEDURE — 99999 PR PBB SHADOW E&M-EST. PATIENT-LVL III: CPT | Mod: PBBFAC,,, | Performed by: STUDENT IN AN ORGANIZED HEALTH CARE EDUCATION/TRAINING PROGRAM

## 2025-01-09 PROCEDURE — 99213 OFFICE O/P EST LOW 20 MIN: CPT | Mod: S$GLB,,, | Performed by: ORTHOPAEDIC SURGERY

## 2025-01-09 PROCEDURE — 99203 OFFICE O/P NEW LOW 30 MIN: CPT | Mod: S$GLB,,, | Performed by: STUDENT IN AN ORGANIZED HEALTH CARE EDUCATION/TRAINING PROGRAM

## 2025-01-09 PROCEDURE — G2211 COMPLEX E/M VISIT ADD ON: HCPCS | Mod: S$GLB,,, | Performed by: STUDENT IN AN ORGANIZED HEALTH CARE EDUCATION/TRAINING PROGRAM

## 2025-01-09 RX ORDER — TRIAMCINOLONE ACETONIDE 1 MG/G
OINTMENT TOPICAL 2 TIMES DAILY
Qty: 80 G | Refills: 2 | Status: SHIPPED | OUTPATIENT
Start: 2025-01-09

## 2025-01-09 NOTE — PROGRESS NOTES
Patient Information  Name: Madhuri Hickman  : 1955  MRN: [id number]     Referring Physician:  Dr. Hernandez   Primary Care Physician:  Mini Mistry MD   Date of Visit: 2025      Subjective:       Madhuri Hickman is a 69 y.o. female who presents for   Chief Complaint   Patient presents with    Herpes Zoster     Hx of shingles. Sx still itching.      HPI  Patient with a history of herpes zoster, here to establish care.  She reports that the area where she developed shingles is currently itchy.  She has been using mupirocin ointment without any improvement.  Patient was last seen:Visit date not found     Prior notes by myself reviewed.   Clinical documentation obtained by nursing staff reviewed.    Review of Systems   Skin:  Positive for itching. Negative for rash.        Objective:    Physical Exam   Constitutional: She appears well-developed and well-nourished. No distress.   Neurological: She is alert and oriented to person, place, and time. She is not disoriented.   Psychiatric: She has a normal mood and affect.   Skin:   Areas Examined (abnormalities noted in diagram):   Head / Face Inspection Performed  Neck Inspection Performed  Chest / Axilla Inspection Performed              Diagram Legend     Erythematous scaling macule/papule c/w actinic keratosis       Vascular papule c/w angioma      Pigmented verrucoid papule/plaque c/w seborrheic keratosis      Yellow umbilicated papule c/w sebaceous hyperplasia      Irregularly shaped tan macule c/w lentigo     1-2 mm smooth white papules consistent with Milia      Movable subcutaneous cyst with punctum c/w epidermal inclusion cyst      Subcutaneous movable cyst c/w pilar cyst      Firm pink to brown papule c/w dermatofibroma      Pedunculated fleshy papule(s) c/w skin tag(s)      Evenly pigmented macule c/w junctional nevus     Mildly variegated pigmented, slightly irregular-bordered macule c/w mildly atypical nevus      Flesh colored  to evenly pigmented papule c/w intradermal nevus       Pink pearly papule/plaque c/w basal cell carcinoma      Erythematous hyperkeratotic cursted plaque c/w SCC      Surgical scar with no sign of skin cancer recurrence      Open and closed comedones      Inflammatory papules and pustules      Verrucoid papule consistent consistent with wart     Erythematous eczematous patches and plaques     Dystrophic onycholytic nail with subungual debris c/w onychomycosis     Umbilicated papule    Erythematous-base heme-crusted tan verrucoid plaque consistent with inflamed seborrheic keratosis     Erythematous Silvery Scaling Plaque c/w Psoriasis     See annotation      No images are attached to the encounter or orders placed in the encounter.    [] Data reviewed  [] Independent review of test  [] Management discussed with another provider    Assessment / Plan:        Pruritus  -     triamcinolone acetonide 0.1% (KENALOG) 0.1 % ointment; Apply topically 2 (two) times daily.  Dispense: 80 g; Refill: 2    Counseling on topical steroids:  Patient counseled that the prolonged use of topical steroids can result in the increased appearance superficial blood vessels (telangiectasias) lightening (hypopigmentation), and   thinning of the skin ( atrophy).  Patient understands to avoid using high potency steroids in skin folds, the groin or the face.  The patient verbalized understanding of proper use and possible adverse effects of topical steroids.  All patient's questions and concerns were addressed.           LOS NUMBER AND COMPLEXITY OF PROBLEMS    COMPLEXITY OF DATA RISK TOTAL TIME (m)   02734  99905 [] 1 self-limited or minor problem [x] Minimal to none [] No treatment recommended or patient to monitor 15-29  10-19   12239  57547 Low  [] 2 or > self limited or minor problems  [] 1 stable chronic illness  [x] 1 acute, uncomplicated illness or injury Limited (2)  [] Prior external notes from each unique source  [] Review result of  each unique test  [] Order each unique test []  Low  OTC medications, minor skin biopsy 30-44  20-29   93459  09793 Moderate  []  1 or > chronic illness with progression, exacerbation or SE of treatment  []  2 or more stable chronic illnesses  []  1 acute illness with systemic symptoms  []  1 acute complicated injury  []  1 undiagnosed new problem with uncertain prognosis Moderate (1/3 below)  []  3 or more data items        *Now includes assessment requiring independent historian  []  Independent interpretation of a test  []  Discuss management/test with another provider Moderate  [x]  Prescription drug mgmt  []  Minor surgery with risk discussed  []  Mgmt limited by social determinates 45-59  30-39   68537  32091 High  []  1 or more chronic illness with severe exacerbation, progression or SE of treatment  []  1 acute or chronic illness/injury that poses a threat to life or bodily function Extensive (2/3 below)  []  3 or more data items        *Now includes assessment requiring independent historian.  []  Independent interpretation of a test  []  Discuss management/test with another provider High  []  Major surgery with risk discussed  []  Drug therapy requiring intensive monitoring for toxicity  []  Hospitalization  []  Decision for DNR 60-74  40-54      No follow-ups on file.    Deb Olivares MD, FAAD  [facility]

## 2025-01-09 NOTE — PROGRESS NOTES
Subjective:     Patient ID: Madhuri Hickman is a 69 y.o. female.    Chief Complaint: Pain of the Left Knee     Mild right knee pain  HPI:  09/21/2020  64-year-old  who injured her knee approximately 16 years ago at work wears a wooden steps broke while going up.  She was eventually return to work with restrictions.  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  She did not hit the ground she held onto the truck.  She stated ibuprofen 800 mg helps but she ran out she really requesting renewal.  She has not received any injections over the last several years.  She has occasional catching locking and giving way.  Pain is 6/10 that is constant worst with stairs if she tries to go up.  Unable to squat or kneel.  Unable to stand or walk too long.  She is not using any assistive devices to ambulate.  Denies any fever or chills or shortness of breath or difficulty with chewing or swallowing loss of bowel bladder control or blurry vision or double vision or numbness or tingling in the legs or hands    11/09/2020  Steroid injection into the left knee as well as ibuprofen seems to have helped given on 09/21.  The story goes that she slipped getting out of her mail truck and slipped and grass.  She does have pre-existing arthritis in her left knee and this fall aggravated it.  She states the physical therapy is helping the injection helped the ibuprofen is helping.  Still afraid L catching locking.  Her pain is 5/10  Denies any fever or chills or shortness of breath or difficulty with chewing or swallowing loss of bowel bladder control.      01/07/2021  Patient arrived 21 min late for her appointment  Arthritis of both knees.  Posttraumatic left knee arthrosis.  She was instructed to take ibuprofen and Tylenol as needed.  Received steroid injection on 09/21/2020 with good relief of her pain  Still going to physical therapy at [facility].  She is doing slightly better now she can get  out of the tub instead of showering.  Walking 100 ft become severely painful has to sit down.  Still doing her dishes sitting down.  She is questioning my diagnosis at this point since her work says do not except arthritis as a condition.  I did tell her her injury worsened what she has her arthritis and considered posttraumatic arthritis of the knee.  This is a medical condition and has a code.  Since she is improving on ibuprofen and physical therapy and Tylenol no need to perform any injection.  Pain is 6/10    Denies any fever or chills or shortness of breath or difficulty with chewing or swallowing loss of bowel bladder control blurry vision double vision    02/26/2021  Left knee severe posttraumatic arthritis and right knee mild to moderate arthritis.  Numerous treatment including physical therapy, steroid injection 09/21/2020, hyaluronic injection 01/07/2021 and different NSAIDs did not seem to make be used difference just mild assistant.  She is using occasionally her cane to get around.  She is at a point she says she wants to proceed with her total knee replacement.  The ibuprofen does not seem to work as well.  She feels sometimes Tylenol work better.  Activities of daily living seems to be difficult even washing her dishes at this point seems to be impossible.    Denies any fever or chills or shortness of breath or difficulty with chewing or swallowing loss of bowel bladder control blurry vision double vision or loss of sense smell or taste    05/20/2021  Left knee severe posttraumatic arthritis.  Patient has denied total knee replacement.  She is taking gabapentin and meloxicam.  At this time she is not working.  Today nurse from [employer] is with her and patient agreed for her to sit and discuss.  Nurse's name Angela Camejo .  We did discuss and showed x-ray.  Discussed treatment plan which included injections of steroid different anti-inflammatories physical therapy, gabapentin..  So far she still  under review.  It eventually if that  gets denied need to go for independent medical examination  No fever no chills no shortness of breath or difficulty with chewing or swallowing loss of bowel bladder control blurry vision double vision loss sense smell or taste      07/26/2021  Left knee posttraumatic arthritis.  Things are getting worst since last time we have seen her.  Now it is locking on her in the middle of the night unable to get any relief.  She is still taking the gabapentin occasionally taking the meloxicam.  Her pain is 8/10.  Unable to stand for any .  More than 10 minutes.  She is having catching locking feeling in the knee despite not doing anything in the middle of the night also.  Unable to do steps.  Walking from the parking lot to the office was very difficult.  Using a cane to get around.  States she was scared that her knee locked on her in the middle of the night and could not do anything for it until slowly unlocked itself by the next day.  These episodes are becoming more frequent than normal.  Patient had been through anti-inflammatories, gabapentin, steroid injections, viscosupplementation injections with very mild and minimal relief.  No fever no chills no shortness of breath or difficulty with chewing or swallowing or loss of bowel bladder control blurry vision double vision loss sense smell or taste    02/14/2022  Left total knee replacement 12/29/2021.  Patient going to [facility] physical therapy in [place] but no true she.  She is taking Tylenol.  She ran out of her gabapentin and was not too sure if she needs to continue with it.  Her right knee with severe arthritis hurts her more than the left.  She is pleased with the results so far on the left knee and ambulating without any assistive devices.  She stated she was supposed to get a bath tub bench because of her tub and she needs assistance to get inside the tub and get out and never received it..  She complains occasional numbness in  the toes and on the outside of her knee.  I refreshed her memory that I did tell her that almost all total knees are numb on the outside of the knees and with time she will forget about it.  And as far as the numbness in the toes I did tell her that this is usually secondary to her back.  Pain is 5/10    03/28/2022  Left TKA 12/29/2021/worker's comp  Patient never received approval to continue to go to physical therapy at [facility] or the [facility].  Patient stated the request was not sent to the worker's Comp person at [facility] to get the approval of the above.  Pain is 6/10.  She is able to ambulate without any assistive devices.  She said she is happy she had her surgery done.  She does not think she can return to work at this time.  She had lost time from not being able to go to physical therapy.  She does ambulate without any assistive devices.    Right knee she is having pain in that but that is a different worker's comp number and we will not evaluate today    06/17/2022    Patient has left TKA 12/29/2021 worker's comp    Has not done to physical therapy had not received bath tub bench.  She is doing her own independent exercises.  She still a little weak she does ambulate without any assistive devices.  She is taking gabapentin 300 twice a day and meloxicam on a daily basis.  Something with worker's comp approval and not approving things.  I cannot and is point know what is the problem.  When patients have undergo major surgery the required physical therapy and it is part of the deal.  She is happy that she had her total knee replacement not having any pain 0/10 but still having some weakness in the leg.    09/22/2022 /checked in 10 minutes late for her appointment  Left TKA 12/29/2021/worker's comp.  Right knee arthritis is a different worker's comp claim  Patient stated the worker's comp physical therapy referral was placed on the right knee which is not the 1 we operated on.  Now finally she starting  physical therapy over the last 2 weeks at the [facility] in [facility].  She starting with the water therapy.  She is walking without any assistive devices.  She still taking the gabapentin.  Her pain is 5/10.  Now she is having a little bit more pain in the right knee compared to the left.  No fever no chills no shortness of breath difficulty with chewing swallowing loss of bowel bladder control blurry vision double vision loss sense smell or taste     She has not return to work return to work    12/12/2022    Left TKA 12/29/2021 which is a worker's comp injury different claim than the right side   Patient finally is going to [facility] physical therapy [facility].  She still taking gabapentin and meloxicam.  She is ambulating without any assistive devices today.  She said she is very happy with the results on the left knee her pain is 0/10 because she is taking the medication   No fever no chills no shortness of breath or difficulty with chewing swallowing or loss of bowel bladder control    03/16/2023   Left TKA 12/29/2021 worker's comp injury.  She has finish her therapy at [facility] .  She is doing independent exercise and going to the gym.  She still taking meloxicam and gabapentin on a daily basis.  Her pain is 4/10.  She ambulates without any assistive devices   I did tell her she needs to go for functional capacity evaluation at this time to see what she can do in order to arrange for return to work     She has been taking these medications the meloxicam and gabapentin for a while now.  We need to make sure her liver and her kidney functions are still okay.  I will order a complete metabolic panel  She has an appointment to see her primary care physician coming up soon to assess other things that she has.    05/06/2024  Left TKA 12/29/2021 eventually she had a functional capacity evaluation that showed that she is markedly deconditioned.  Only could do sedentary type of work.  I went over it with the patient.   She is still feeling weakness in the legs and went over the medications.  No fever no chills no shortness breath or pain is still at 4/10.  I did discuss that total knee replacement is not perfect it is 80% successful in decreasing pain and increasing function.  She stated she is definitely better than before having surgery You     09/09/2024   Left TKA 12/29/2021, FCU shown that she is markedly deconditioned and she needs extensive physical therapy.  They working with the night now in the pool and doing aquatic to allow her to be able to get more strengthen less loading on the joints.  She is not there yet.  And I think she can not do 8 hours of sedentary type of work.  She is taking meloxicam and now she is taking gabapentin which should help with nerve pain.  She recently acquired shingles to the right upper extremity and I did tell her that she should ask primary care about the vaccine.  The vaccine helps real preventing reoccurrence.  She is still trying to lose weight but BMI is 41.31  I did tell the patient that total knee replacement is not perfect it is 80% helpful however there is 20% of the time where things do not work out well and still complain of certain problems.  There is arthritis in the right knee    01/09/2025   Left TKA 12/29/2021   Patient states her pain is 8-9/10.  She said when she is doing the aquatic therapy it helps significantly however they give you only 6 visits and they stop the tell her she ran out of approval.  This patient needs to continue constant aquatic therapy.  We will ask worker's comp if they will pay monthly do's or membership at the therapist so she can use the equipment and do the exercises required.  She states standing in line light say at the bank to do some activity she will start with pain in the left knee.  She is unable to stay out of her gabapentin in the meloxicam she finds big difference if she does not take it.  Her BMI had not changed much it she is still at  41.53  No fever no chills no shortness of breath or difficulty with chewing swallowing   I did tell her that I would like to obtain another x-ray last x-ray was in May of 2024 we will obtain it next visit    Past Medical History:   Diagnosis Date    Anxiety     Arthritis     left knee    Encounter for general adult medical examination without abnormal findings 05/09/2017    Hypertension     Vitamin D deficiency      Past Surgical History:   Procedure Laterality Date    HYSTERECTOMY      total    TOTAL KNEE ARTHROPLASTY Left 12/29/2021    Procedure: ARTHROPLASTY, KNEE, TOTAL;  Surgeon: Alf Santana MD;  Location: [facility];  Service: Orthopedics;  Laterality: Left;    VAGINAL DELIVERY       Family History   Problem Relation Name Age of Onset    Diabetes Mother      Hypertension Mother      Heart disease Father      Hypertension Sister      Heart disease Brother      Diabetes Brother      Cancer Maternal Grandmother          breast     Social History     Socioeconomic History    Marital status:    Tobacco Use    Smoking status: Never    Smokeless tobacco: Never   Substance and Sexual Activity    Alcohol use: Yes     Comment: social; hold 72hrs prior to surgery    Drug use: No    Sexual activity: Not Currently     Medication List with Changes/Refills   Current Medications    AMLODIPINE-BENAZEPRIL 10-20MG (LOTREL) 10-20 MG PER CAPSULE    TAKE 1 CAPSULE BY MOUTH EVERY DAY    CHLORTHALIDONE (HYGROTEN) 25 MG TAB    Take 25 mg by mouth.    CYANOCOBALAMIN (VITAMIN B-12) 1000 MCG TABLET    Take 100 mcg by mouth once daily.    ERGOCALCIFEROL (ERGOCALCIFEROL) 50,000 UNIT CAP    TAKE 1 CAPSULE BY MOUTH ONE TIME PER WEEK    GABAPENTIN (NEURONTIN) 600 MG TABLET    Take 1 tablet (600 mg total) by mouth 2 (two) times daily.    HYDROXYZINE HCL (ATARAX) 10 MG TAB    TAKE 1 TABLET (10 MG TOTAL) BY MOUTH NIGHTLY AS NEEDED (AS NEEDED (ITCHING/INSOMNIA).).    JANUMET -1,000 MG TM24    TAKE 1 TABLET BY MOUTH EVERY DAY     MELOXICAM (MOBIC) 15 MG TABLET    Take 1 tablet (15 mg total) by mouth once daily. Take with food    MUPIROCIN (BACTROBAN) 2 % OINTMENT    Apply topically 3 (three) times daily.    ONDANSETRON (ZOFRAN-ODT) 4 MG TBDL    Take 4 mg by mouth once.    PANTOPRAZOLE (PROTONIX) 40 MG TABLET    Take 40 mg by mouth.     Review of patient's allergies indicates:  No Known Allergies  Review of Systems   Constitutional: Negative for decreased appetite.   HENT:  Negative for tinnitus.    Eyes:  Negative for double vision.   Cardiovascular:  Negative for chest pain.   Respiratory:  Negative for wheezing.    Hematologic/Lymphatic: Negative for bleeding problem.   Skin:  Negative for dry skin.   Musculoskeletal:  Positive for arthritis, joint pain and stiffness. Negative for back pain, falls, gout, joint swelling and neck pain.   Gastrointestinal:  Negative for abdominal pain.   Genitourinary:  Negative for bladder incontinence.   Neurological:  Negative for numbness, paresthesias and sensory change.   Psychiatric/Behavioral:  Negative for altered mental status.        Objective:   Body mass index is 41.53 kg/m².  There were no vitals filed for this visit.       General    Constitutional: She is oriented to person, place, and time. She appears well-developed.   HENT:   Head: Atraumatic.   Eyes: EOM are normal.   Cardiovascular:  Normal rate.            Pulmonary/Chest: Effort normal.   Neurological: She is alert and oriented to person, place, and time.   Psychiatric: Judgment normal.              Ambulating without any assistive devices/there is a waddling gait  Pelvis is level  Bilateral hips passive motion without pain or limitations.  There is no pain to palpation over the trochanters.  Hip flexors, abductors, adductors, quads, hamstrings, ankle extensors and flexors inverters and everters all 5/5  Left knee surgical incision healed well there is no drainage.  There is no warmness to touch.  Active motion 0-130 degrees.  Stable  in extension and flexion.  Slight decrease in sensation in the lateral aspect of the knee joint.  There is no defect in the patella or quadriceps tendon her quads and hamstrings are still weak at 4/5    Able to move her ankle up and down  Right knee mild medial joint pain/moderate lateral joint pain.  No swelling.  Motion 0-120 degrees.  Collaterals and cruciates are stable.  Negative Bienvenido sign.  No defect in the patella or quadriceps tendon  Bilateral calves are soft nontender  Slight swelling around the ankle  DP 1+  PT 1+  Skin is warm to touch no obvious lesions.  Sensory intact to touch    Relevant imaging results reviewed and interpreted by me, discussed with the patient and / or family today   X-ray 05/06/2024 left TKA still in excellent alignment patella midline no evidence of failure.  [employer] posterior stabilized knee system.  Right knee with moderate arthritis in the lateral joint and moderate lateral joint narrowing and small marginal osteophyte  X-ray 03/16/2023 left TKA in excellent alignment.  No evidence of failure.  Right knee with moderately severe arthritis on the lateral joint with marginal osteophytic change  X-ray 02/14/2022 left TKA in excellent alignment.  No evidence of fracture.  Patella midline.  Right knee with complete loss lateral joint space without fracture on AP with flexion  X-ray 12/29/2021 left TKA without evidence of fracture.  Do not have true AP and lateral views.  Staples intact  Personally reviewed the x-rays of both knees with the patient agree with the above  X-ray 09/03/2020 bilateral knees with left knee severe loss of medial joint space and osteophytic changes anteriorly and as well as laterally.  Also the right knee has some arthritic changes with not as bad loss of joint space as the left  Assessment:     Encounter Diagnoses   Name Primary?    Arthritis of knee, right     Acquired valgus deformity knee, right     History of total left knee replacement Yes     Morbid obesity with BMI of 40.0-44.9, adult         Plan:   History of total left knee replacement    Arthritis of knee, right    Acquired valgus deformity knee, right    Morbid obesity with BMI of 40.0-44.9, adult         Patient Instructions   Knee any short period of time like standing in the line in the bank your left knee started hurting  The aquatic therapy at [facility] seems to work and help however you only could get 6 visits  You should ask [facility] therapy if they will accept membership fees for you to be able to go use the equipment  Worker's comp should be able to pay your monthly do's for you to continue doing the exercises specially aquatic exercises at the therapy place  Without the meloxicam and the gabapentin you can not function at all  I think you should be disabled from this injury I do not think you can sit for 8 hours to do a job or stand more than 15-20 minutes at a time  I would like to obtain repeat x-ray in the next 3-4 months to see if things have changed since you can not stand for more than 10 15 minutes we need to make sure the prosthesis is functioning well  Still considered unable to return to work and functional capacity evaluation had considered you markedly deconditioned and that you need extensive physical therapy   Should repeat the functional capacity evaluation in 9 months to a year from now   Must continue with the aquatic exercise program to maintain strength in order to be able to do activities of daily living      Disclaimer: This note was prepared using a voice recognition system and is likely to have sound alike errors within the text.

## 2025-01-09 NOTE — PATIENT INSTRUCTIONS
Knee any short period of time like standing in the line in the bank your left knee started hurting  The aquatic therapy at [facility] seems to work and help however you only could get 6 visits  You should ask [facility] therapy if they will accept membership fees for you to be able to go use the equipment  Worker's comp should be able to pay your monthly do's for you to continue doing the exercises specially aquatic exercises at the therapy place  Without the meloxicam and the gabapentin you can not function at all  I think you should be disabled from this injury I do not think you can sit for 8 hours to do a job or stand more than 15-20 minutes at a time  I would like to obtain repeat x-ray in the next 3-4 months to see if things have changed since you can not stand for more than 10 15 minutes we need to make sure the prosthesis is functioning well  Still considered unable to return to work and functional capacity evaluation had considered you markedly deconditioned and that you need extensive physical therapy   Should repeat the functional capacity evaluation in 9 months to a year from now   Must continue with the aquatic exercise program to maintain strength in order to be able to do activities of daily living

## 2025-01-21 ENCOUNTER — TELEPHONE (OUTPATIENT)
Dept: SPORTS MEDICINE | Facility: CLINIC | Age: 70
End: 2025-01-21
Payer: OTHER GOVERNMENT

## 2025-01-21 NOTE — TELEPHONE ENCOUNTER
Called. They don't have claim for patient.     ----- Message from Emilia sent at 1/21/2025 10:24 AM CST -----  Contact: [facility]  .Type:  Needs Medical Advice    Who Called: Quita with [facility]  Would the patient rather a call back or a response via [facility]? Call back  Best Call Back Number: [phone]  Additional Information: Wanting a call to verify the order instruction on what kind of therapy the pt is needing.

## 2025-05-08 ENCOUNTER — TELEPHONE (OUTPATIENT)
Dept: ORTHOPEDICS | Facility: CLINIC | Age: 70
End: 2025-05-08
Payer: OTHER GOVERNMENT

## 2025-05-08 NOTE — TELEPHONE ENCOUNTER
----- Message from Osei sent at 5/8/2025  4:31 PM CDT -----  Contact: RICHARD  Type:  Needs Medical AdviceWho Called: charlotteSymptoms (please be specific): knee feels out of place, painful How long has patient had these symptoms:  Pharmacy name and phone #:  Would the patient rather a call back or a response via [facility]? Call [facility] Call Back Number: [phone] Information:

## 2025-05-12 ENCOUNTER — HOSPITAL ENCOUNTER (OUTPATIENT)
Dept: RADIOLOGY | Facility: HOSPITAL | Age: 70
Discharge: HOME | End: 2025-05-12
Attending: ORTHOPAEDIC SURGERY
Payer: OTHER GOVERNMENT

## 2025-05-12 ENCOUNTER — OFFICE VISIT (OUTPATIENT)
Dept: ORTHOPEDICS | Facility: CLINIC | Age: 70
End: 2025-05-12
Payer: OTHER GOVERNMENT

## 2025-05-12 VITALS — WEIGHT: 266.13 LBS | BODY MASS INDEX: 42.77 KG/M2 | HEIGHT: 66 IN

## 2025-05-12 DIAGNOSIS — M21.061 ACQUIRED VALGUS DEFORMITY KNEE, RIGHT: ICD-10-CM

## 2025-05-12 DIAGNOSIS — M25.562 LEFT KNEE PAIN, UNSPECIFIED CHRONICITY: ICD-10-CM

## 2025-05-12 DIAGNOSIS — M17.11 ARTHRITIS OF KNEE, RIGHT: ICD-10-CM

## 2025-05-12 DIAGNOSIS — E66.01 MORBID OBESITY WITH BMI OF 40.0-44.9, ADULT: ICD-10-CM

## 2025-05-12 DIAGNOSIS — Z96.652 HISTORY OF TOTAL LEFT KNEE REPLACEMENT: Primary | ICD-10-CM

## 2025-05-12 PROCEDURE — 73562 X-RAY EXAM OF KNEE 3: CPT | Mod: 26,59,RT, | Performed by: RADIOLOGY

## 2025-05-12 PROCEDURE — 73562 X-RAY EXAM OF KNEE 3: CPT | Mod: TC,RT

## 2025-05-12 PROCEDURE — 99999 PR PBB SHADOW E&M-EST. PATIENT-LVL III: CPT | Mod: PBBFAC,,, | Performed by: ORTHOPAEDIC SURGERY

## 2025-05-12 PROCEDURE — 73564 X-RAY EXAM KNEE 4 OR MORE: CPT | Mod: 26,LT,, | Performed by: RADIOLOGY

## 2025-05-12 PROCEDURE — 99214 OFFICE O/P EST MOD 30 MIN: CPT | Mod: S$GLB,,, | Performed by: ORTHOPAEDIC SURGERY

## 2025-05-12 RX ORDER — MELOXICAM 15 MG/1
15 TABLET ORAL DAILY
Qty: 30 TABLET | Refills: 3 | Status: SHIPPED | OUTPATIENT
Start: 2025-05-12

## 2025-05-12 NOTE — PATIENT INSTRUCTIONS
You did cleaned the house several days ago and you started with left knee pain  You explained that the pain is in the front of the knee right underneath the kneecap wear it most of your pain is  Got better for a little while in you drove herself here to the clinic and then by the time he got here the pain the occurred on the left knee   X-ray of your left total knee show excellent alignment patella still midline  Suspicious that maybe scar tissue went between the plastic and metal and into the notch and that is caused the pain in the front of her knee  At this time we will watch it and will start you on meloxicam 15 mg once a day with food   You can add Tylenol Arthritis 650 mg 3 times a day with it if needed  I will see you back in 4 weeks for re-evaluation  Briefly discussed if this continues most likely it scar tissue pinching between the plastic and metal that might require minor surgeries called arthroscopic surgery hopefully things will get better on their own without needing to proceed to surgery

## 2025-05-12 NOTE — PROGRESS NOTES
Subjective:     Patient ID: Madhuri Hickman is a 69 y.o. female.    Chief Complaint: Pain of the Left Knee     Mild right knee pain  HPI:  09/21/2020  64-year-old  who injured her knee approximately 16 years ago at work wears a wooden steps broke while going up.  She was eventually return to work with restrictions.  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  She did not hit the ground she held onto the truck.  She stated ibuprofen 800 mg helps but she ran out she really requesting renewal.  She has not received any injections over the last several years.  She has occasional catching locking and giving way.  Pain is 6/10 that is constant worst with stairs if she tries to go up.  Unable to squat or kneel.  Unable to stand or walk too long.  She is not using any assistive devices to ambulate.  Denies any fever or chills or shortness of breath or difficulty with chewing or swallowing loss of bowel bladder control or blurry vision or double vision or numbness or tingling in the legs or hands    11/09/2020  Steroid injection into the left knee as well as ibuprofen seems to have helped given on 09/21.  The story goes that she slipped getting out of her mail truck and slipped and grass.  She does have pre-existing arthritis in her left knee and this fall aggravated it.  She states the physical therapy is helping the injection helped the ibuprofen is helping.  Still afraid L catching locking.  Her pain is 5/10  Denies any fever or chills or shortness of breath or difficulty with chewing or swallowing loss of bowel bladder control.      01/07/2021  Patient arrived 21 min late for her appointment  Arthritis of both knees.  Posttraumatic left knee arthrosis.  She was instructed to take ibuprofen and Tylenol as needed.  Received steroid injection on 09/21/2020 with good relief of her pain  Still going to physical therapy at [facility].  She is doing slightly better now she can get  out of the tub instead of showering.  Walking 100 ft become severely painful has to sit down.  Still doing her dishes sitting down.  She is questioning my diagnosis at this point since her work says do not except arthritis as a condition.  I did tell her her injury worsened what she has her arthritis and considered posttraumatic arthritis of the knee.  This is a medical condition and has a code.  Since she is improving on ibuprofen and physical therapy and Tylenol no need to perform any injection.  Pain is 6/10    Denies any fever or chills or shortness of breath or difficulty with chewing or swallowing loss of bowel bladder control blurry vision double vision    02/26/2021  Left knee severe posttraumatic arthritis and right knee mild to moderate arthritis.  Numerous treatment including physical therapy, steroid injection 09/21/2020, hyaluronic injection 01/07/2021 and different NSAIDs did not seem to make be used difference just mild assistant.  She is using occasionally her cane to get around.  She is at a point she says she wants to proceed with her total knee replacement.  The ibuprofen does not seem to work as well.  She feels sometimes Tylenol work better.  Activities of daily living seems to be difficult even washing her dishes at this point seems to be impossible.    Denies any fever or chills or shortness of breath or difficulty with chewing or swallowing loss of bowel bladder control blurry vision double vision or loss of sense smell or taste    05/20/2021  Left knee severe posttraumatic arthritis.  Patient has denied total knee replacement.  She is taking gabapentin and meloxicam.  At this time she is not working.  Today nurse from [employer] is with her and patient agreed for her to sit and discuss.  Nurse's name Angela Camejo .  We did discuss and showed x-ray.  Discussed treatment plan which included injections of steroid different anti-inflammatories physical therapy, gabapentin..  So far she still  under review.  It eventually if that  gets denied need to go for independent medical examination  No fever no chills no shortness of breath or difficulty with chewing or swallowing loss of bowel bladder control blurry vision double vision loss sense smell or taste      07/26/2021  Left knee posttraumatic arthritis.  Things are getting worst since last time we have seen her.  Now it is locking on her in the middle of the night unable to get any relief.  She is still taking the gabapentin occasionally taking the meloxicam.  Her pain is 8/10.  Unable to stand for any .  More than 10 minutes.  She is having catching locking feeling in the knee despite not doing anything in the middle of the night also.  Unable to do steps.  Walking from the parking lot to the office was very difficult.  Using a cane to get around.  States she was scared that her knee locked on her in the middle of the night and could not do anything for it until slowly unlocked itself by the next day.  These episodes are becoming more frequent than normal.  Patient had been through anti-inflammatories, gabapentin, steroid injections, viscosupplementation injections with very mild and minimal relief.  No fever no chills no shortness of breath or difficulty with chewing or swallowing or loss of bowel bladder control blurry vision double vision loss sense smell or taste    02/14/2022  Left total knee replacement 12/29/2021.  Patient going to [facility] physical therapy in [place] but no true she.  She is taking Tylenol.  She ran out of her gabapentin and was not too sure if she needs to continue with it.  Her right knee with severe arthritis hurts her more than the left.  She is pleased with the results so far on the left knee and ambulating without any assistive devices.  She stated she was supposed to get a bath tub bench because of her tub and she needs assistance to get inside the tub and get out and never received it..  She complains occasional numbness in  the toes and on the outside of her knee.  I refreshed her memory that I did tell her that almost all total knees are numb on the outside of the knees and with time she will forget about it.  And as far as the numbness in the toes I did tell her that this is usually secondary to her back.  Pain is 5/10    03/28/2022  Left TKA 12/29/2021/worker's comp  Patient never received approval to continue to go to physical therapy at [facility] or the [facility].  Patient stated the request was not sent to the worker's Comp person at [facility] to get the approval of the above.  Pain is 6/10.  She is able to ambulate without any assistive devices.  She said she is happy she had her surgery done.  She does not think she can return to work at this time.  She had lost time from not being able to go to physical therapy.  She does ambulate without any assistive devices.    Right knee she is having pain in that but that is a different worker's comp number and we will not evaluate today    06/17/2022    Patient has left TKA 12/29/2021 worker's comp    Has not done to physical therapy had not received bath tub bench.  She is doing her own independent exercises.  She still a little weak she does ambulate without any assistive devices.  She is taking gabapentin 300 twice a day and meloxicam on a daily basis.  Something with worker's comp approval and not approving things.  I cannot and is point know what is the problem.  When patients have undergo major surgery the required physical therapy and it is part of the deal.  She is happy that she had her total knee replacement not having any pain 0/10 but still having some weakness in the leg.    09/22/2022 /checked in 10 minutes late for her appointment  Left TKA 12/29/2021/worker's comp.  Right knee arthritis is a different worker's comp claim  Patient stated the worker's comp physical therapy referral was placed on the right knee which is not the 1 we operated on.  Now finally she starting  physical therapy over the last 2 weeks at the [facility] in [facility].  She starting with the water therapy.  She is walking without any assistive devices.  She still taking the gabapentin.  Her pain is 5/10.  Now she is having a little bit more pain in the right knee compared to the left.  No fever no chills no shortness of breath difficulty with chewing swallowing loss of bowel bladder control blurry vision double vision loss sense smell or taste     She has not return to work return to work    12/12/2022    Left TKA 12/29/2021 which is a worker's comp injury different claim than the right side   Patient finally is going to [facility] physical therapy [facility].  She still taking gabapentin and meloxicam.  She is ambulating without any assistive devices today.  She said she is very happy with the results on the left knee her pain is 0/10 because she is taking the medication   No fever no chills no shortness of breath or difficulty with chewing swallowing or loss of bowel bladder control    03/16/2023   Left TKA 12/29/2021 worker's comp injury.  She has finish her therapy at [facility] .  She is doing independent exercise and going to the gym.  She still taking meloxicam and gabapentin on a daily basis.  Her pain is 4/10.  She ambulates without any assistive devices   I did tell her she needs to go for functional capacity evaluation at this time to see what she can do in order to arrange for return to work     She has been taking these medications the meloxicam and gabapentin for a while now.  We need to make sure her liver and her kidney functions are still okay.  I will order a complete metabolic panel  She has an appointment to see her primary care physician coming up soon to assess other things that she has.    05/06/2024  Left TKA 12/29/2021 eventually she had a functional capacity evaluation that showed that she is markedly deconditioned.  Only could do sedentary type of work.  I went over it with the patient.   She is still feeling weakness in the legs and went over the medications.  No fever no chills no shortness breath or pain is still at 4/10.  I did discuss that total knee replacement is not perfect it is 80% successful in decreasing pain and increasing function.  She stated she is definitely better than before having surgery You     09/09/2024   Left TKA 12/29/2021, [facility] shown that she is markedly deconditioned and she needs extensive physical therapy.  They working with the night now in the pool and doing aquatic to allow her to be able to get more strengthen less loading on the joints.  She is not there yet.  And I think she can not do 8 hours of sedentary type of work.  She is taking meloxicam and now she is taking gabapentin which should help with nerve pain.  She recently acquired shingles to the right upper extremity and I did tell her that she should ask primary care about the vaccine.  The vaccine helps real preventing reoccurrence.  She is still trying to lose weight but BMI is 41.31  I did tell the patient that total knee replacement is not perfect it is 80% helpful however there is 20% of the time where things do not work out well and still complain of certain problems.  There is arthritis in the right knee    01/09/2025   Left TKA 12/29/2021   Patient states her pain is 8-9/10.  She said when she is doing the aquatic therapy it helps significantly however they give you only 6 visits and they stop the tell her she ran out of approval.  This patient needs to continue constant aquatic therapy.  We will ask worker's comp if they will pay monthly do's or membership at the therapist so she can use the equipment and do the exercises required.  She states standing in line light say at the bank to do some activity she will start with pain in the left knee.  She is unable to stay out of her gabapentin in the meloxicam she finds big difference if she does not take it.  Her BMI had not changed much it she is still at  41.53  No fever no chills no shortness of breath or difficulty with chewing swallowing   I did tell her that I would like to obtain another x-ray last x-ray was in May of 2024 we will obtain it next visit    05/12/2025   Left TKA 12/29/2021 patient stated that she had to cleaned the house several days ago it started with severe anterior knee pain.  She does not recall squatting or kneeling.  She said started on Thursday and by Sunday things subsided.  Now she drove over and she stated sitting in the cough or 20 minutes getting up from the car it became so tight could not straighten it out as easy.  She did not fall no trauma.  No fever no chills no shortness of breath or difficulty with chewing swallowing loss bowel bladder control   She is ambulating without any assistive devices  Pain 8/10    Past Medical History:   Diagnosis Date    Anxiety     Arthritis     left knee    Encounter for general adult medical examination without abnormal findings 05/09/2017    Hypertension     Vitamin D deficiency      Past Surgical History:   Procedure Laterality Date    HYSTERECTOMY      total    TOTAL KNEE ARTHROPLASTY Left 12/29/2021    Procedure: ARTHROPLASTY, KNEE, TOTAL;  Surgeon: Alf Santana MD;  Location: [facility];  Service: Orthopedics;  Laterality: Left;    VAGINAL DELIVERY       Family History   Problem Relation Name Age of Onset    Diabetes Mother      Hypertension Mother      Heart disease Father      Hypertension Sister      Heart disease Brother      Diabetes Brother      Cancer Maternal Grandmother          breast     Social History     Socioeconomic History    Marital status:    Tobacco Use    Smoking status: Never    Smokeless tobacco: Never   Substance and Sexual Activity    Alcohol use: Yes     Comment: social; hold 72hrs prior to surgery    Drug use: No    Sexual activity: Not Currently     Medication List with Changes/Refills   New Medications    MELOXICAM (MOBIC) 15 MG TABLET    Take 1 tablet  (15 mg total) by mouth once daily. Take with food   Current Medications    AMLODIPINE-BENAZEPRIL 10-20MG (LOTREL) 10-20 MG PER CAPSULE    Take 1 capsule by mouth once daily.    CHLORTHALIDONE (HYGROTEN) 25 MG TAB    Take 25 mg by mouth.    CYANOCOBALAMIN (VITAMIN B-12) 1000 MCG TABLET    Take 100 mcg by mouth once daily.    ERGOCALCIFEROL (ERGOCALCIFEROL) 50,000 UNIT CAP    Take 1 capsule (50,000 Units total) by mouth every 7 days.    HYDROXYZINE HCL (ATARAX) 10 MG TAB    Take 1 tablet (10 mg total) by mouth nightly as needed (as needed (itching/insomnia).).    MUPIROCIN (BACTROBAN) 2 % OINTMENT    Apply topically 3 (three) times daily.    SITAGLIPTAN-METFORMIN (JANUMET XR) 100-1,000 MG TM24    Take 1 tablet by mouth Daily.    TRIAMCINOLONE ACETONIDE 0.1% (KENALOG) 0.1 % OINTMENT    Apply topically 2 (two) times daily.     Review of patient's allergies indicates:  No Known Allergies  Review of Systems   Constitutional: Negative for decreased appetite.   HENT:  Negative for tinnitus.    Eyes:  Negative for double vision.   Cardiovascular:  Negative for chest pain.   Respiratory:  Negative for wheezing.    Hematologic/Lymphatic: Negative for bleeding problem.   Skin:  Negative for dry skin.   Musculoskeletal:  Positive for arthritis, joint pain and stiffness. Negative for back pain, falls, gout, joint swelling and neck pain.   Gastrointestinal:  Negative for abdominal pain.   Genitourinary:  Negative for bladder incontinence.   Neurological:  Negative for numbness, paresthesias and sensory change.   Psychiatric/Behavioral:  Negative for altered mental status.        Objective:   Body mass index is 42.95 kg/m².  There were no vitals filed for this visit.       General    Constitutional: She is oriented to person, place, and time. She appears well-developed.   HENT:   Head: Atraumatic.   Eyes: EOM are normal.   Cardiovascular:  Normal rate.            Pulmonary/Chest: Effort normal.   Neurological: She is alert and  oriented to person, place, and time.   Psychiatric: Judgment normal.              Ambulating without any assistive devices/there is a waddling gait  Pelvis is level  Bilateral hips passive motion without pain or limitations.  There is no pain to palpation over the trochanters.  Hip flexors, abductors, adductors, quads, hamstrings, ankle extensors and flexors inverters and everters all 5/5  Left knee surgical incision healed well there is no drainage.  There is no warmness to touch.  Active motion 0-130 degrees.  Stable in extension and flexion.  Slight decrease in sensation in the lateral aspect of the knee joint.  There is no defect in the patella or quadriceps tendon her quads and hamstrings are still weak at 4/5.  Feels tightness in the anterior joint.  Slowly flexing to around 95°.  No joint line tenderness.  Most of the tenderness is anterior with very mild swelling    Able to move her ankle up and down  Right knee mild medial joint pain/moderate lateral joint pain.  No swelling.  Motion 0-120 degrees.  Collaterals and cruciates are stable.  Negative Bienvenido sign.  No defect in the patella or quadriceps tendon  Bilateral calves are soft nontender  Slight swelling around the ankle  DP 1+  PT 1+  Skin is warm to touch no obvious lesions.  Sensory intact to touch    Relevant imaging results reviewed and interpreted by me, discussed with the patient and / or family today     X-ray 05/12/2025 left total knee [facility] in excellent alignment.  Patella midline not tilting.  No fracture seen.  The right knee has some degenerative changes not different than before  X-ray 05/06/2024 left TKA still in excellent alignment patella midline no evidence of failure.  [facility] posterior stabilized knee system.  Right knee with moderate arthritis in the lateral joint and moderate lateral joint narrowing and small marginal osteophyte  X-ray 03/16/2023 left TKA in excellent alignment.  No evidence of failure.   Right knee with moderately severe arthritis on the lateral joint with marginal osteophytic change  X-ray 02/14/2022 left TKA in excellent alignment.  No evidence of fracture.  Patella midline.  Right knee with complete loss lateral joint space without fracture on AP with flexion  X-ray 12/29/2021 left TKA without evidence of fracture.  Do not have true AP and lateral views.  Staples intact  Personally reviewed the x-rays of both knees with the patient agree with the above  X-ray 09/03/2020 bilateral knees with left knee severe loss of medial joint space and osteophytic changes anteriorly and as well as laterally.  Also the right knee has some arthritic changes with not as bad loss of joint space as the left  Assessment:     Encounter Diagnoses   Name Primary?    History of total left knee replacement Yes    Arthritis of knee, right     Acquired valgus deformity knee, right     Morbid obesity with BMI of 40.0-44.9, adult         Plan:   History of total left knee replacement  -     meloxicam (MOBIC) 15 MG tablet; Take 1 tablet (15 mg total) by mouth once daily. Take with food  Dispense: 30 tablet; Refill: 3    Arthritis of knee, right    Acquired valgus deformity knee, right    Morbid obesity with BMI of 40.0-44.9, adult         Patient Instructions   You did cleaned the house several days ago and you started with left knee pain  You explained that the pain is in the front of the knee right underneath the kneecap wear it most of your pain is  Got better for a little while in you drove herself here to the clinic and then by the time he got here the pain the occurred on the left knee   X-ray of your left total knee show excellent alignment patella still midline  Suspicious that maybe scar tissue went between the plastic and metal and into the notch and that is caused the pain in the front of her knee  At this time we will watch it and will start you on meloxicam 15 mg once a day with food   You can add Tylenol  Arthritis 650 mg 3 times a day with it if needed  I will see you back in 4 weeks for re-evaluation  Briefly discussed if this continues most likely it scar tissue pinching between the plastic and metal that might require minor surgeries called arthroscopic surgery hopefully things will get better on their own without needing to proceed to surgery      Disclaimer: This note was prepared using a voice recognition system and is likely to have sound alike errors within the text.

## 2025-05-29 PROBLEM — R73.02 IGT (IMPAIRED GLUCOSE TOLERANCE): Status: ACTIVE | Noted: 2025-05-29

## 2025-05-29 PROBLEM — E78.2 MIXED HYPERLIPIDEMIA: Status: ACTIVE | Noted: 2025-05-29

## 2025-06-17 NOTE — PROGRESS NOTES
"Nutrition Assessment  Session Time:  60 minutes      Client name:  Madhuri Hickman  :  1955  Age:  69 y.o.  Gender:  female    Client states:  referred by Mini Mcdonald MD with a diagnosis of:   -IGT (impaired glucose tolerance)     Present today for nutrition guidance.   No prior nutrition counseling provided.   Prescribed medication for diabetes management.   Worsening A1c over the years.     Aware to not consume white bread or rice.   Knows sodas should not be consumed, but are included in her diet. 20oz bottled Coke twice per week. Drinks throughout the day     Other drinks: water, lemonade, green tea (bottled, with sugar)  Alcohol: none     Sample choices:  Breakfast: grits, pierce// oatmeal   Lunch: fast food (meat + vegetables + starch)  Dinner: salad (ranch salad dressing)  Snacks, night: chips (Lays), watermelon, popcorn     Exercise: no intentional exercise  At home: sitting watching tv, [employer]    Anthropometrics  Height:  66"     Weight:  264 lbs  BMI:  42.7  % Body Fat:  n/a     RECENT WEIGHTS      Weight (lb) Weight (kg) BMI (Calculated)   2024 265.4  120.385  42.9    2024 265.43  120.4  42.9    7/10/2024 256.2  116.212  41.4    2024 256  116.121  41.3    2024 255.95  116.1  41.3    2024 257.2  116.665  41.5    2025 257.28  116.7  41.5    2025 266.2  120.748  43    2025 266  120.657  43    2025 266.1  120.7  43    2025 263.8  119.659  42.6          Clinical Signs/Symptoms  N/V/D:  none noted  Appetite:  good       Past Medical History:   Diagnosis Date    Anxiety     Arthritis     left knee    Encounter for general adult medical examination without abnormal findings 2017    Hypertension     Vitamin D deficiency        Past Surgical History:   Procedure Laterality Date    HYSTERECTOMY      total    TOTAL KNEE ARTHROPLASTY Left 2021    Procedure: ARTHROPLASTY, KNEE, TOTAL;  Surgeon: Alf Santana MD;  Location: " [facility];  Service: Orthopedics;  Laterality: Left;    VAGINAL DELIVERY         Medications    has a current medication list which includes the following prescription(s): amlodipine-benazepril 10-20mg, chlorthalidone, cyanocobalamin, ergocalciferol, hydroxyzine hcl, meloxicam, mupirocin, janumet xr, and triamcinolone acetonide 0.1%.    Vitamins, Minerals, and/or Supplements:  not discussed     Food/Medication Interactions:  Reviewed     Food Allergies or Intolerances:  NKFA noted in chart     Social History    Marital status:    Employment:  not discussed    Social History     Tobacco Use    Smoking status: Never    Smokeless tobacco: Never   Substance Use Topics    Alcohol use: Yes     Comment: social; hold 72hrs prior to surgery        Lab Reports   Sodium   Date Value Ref Range Status   05/07/2025 141 134 - 144 mmol/L Final     Potassium   Date Value Ref Range Status   05/07/2025 4.0 3.5 - 5.2 mmol/L Final     Chloride   Date Value Ref Range Status   05/07/2025 100 96 - 106 mmol/L Final     CO2   Date Value Ref Range Status   05/07/2025 25 20 - 29 mmol/L Final     Glucose   Date Value Ref Range Status   05/07/2025 148 (H) 70 - 99 mg/dL Final     BUN   Date Value Ref Range Status   05/07/2025 9 8 - 27 mg/dL Final     Creatinine   Date Value Ref Range Status   05/07/2025 0.60 0.57 - 1.00 mg/dL Final     Calcium   Date Value Ref Range Status   05/07/2025 10.2 8.7 - 10.3 mg/dL Final     Total Protein   Date Value Ref Range Status   03/16/2023 7.2 6.0 - 8.4 g/dL Final     Albumin   Date Value Ref Range Status   05/07/2025 4.4 3.9 - 4.9 g/dL Final   03/16/2023 3.8 3.5 - 5.2 g/dL Final     Total Bilirubin   Date Value Ref Range Status   05/07/2025 0.3 0.0 - 1.2 mg/dL Final     Alkaline Phosphatase   Date Value Ref Range Status   03/16/2023 99 55 - 135 U/L Final     AST   Date Value Ref Range Status   05/07/2025 17 0 - 40 IU/L Final     ALT   Date Value Ref Range Status   05/07/2025 16 0 - 32 IU/L Final     Anion  "Gap   Date Value Ref Range Status   03/16/2023 11 8 - 16 mmol/L Final     eGFR if    Date Value Ref Range Status   03/16/2021 >60.0 >60 mL/min/1.73 m^2 Final     eGFR if non    Date Value Ref Range Status   12/10/2021 94 >59 mL/min/1.73 Final      Lab Results   Component Value Date    WBC 9.2 05/07/2025    HGB 14.0 05/07/2025    HCT 44.8 05/07/2025    MCV 87 05/07/2025     05/07/2025        Lab Results   Component Value Date    CHOL 189 05/07/2025     Lab Results   Component Value Date    HDL 54 05/07/2025     Lab Results   Component Value Date    LDLCALC 101 (H) 05/07/2025     Lab Results   Component Value Date    TRIG 198 (H) 05/07/2025     No results found for: "CHOLHDL"  Lab Results   Component Value Date    LABA1C 5.3 07/10/2019    HGBA1C 6.4 (H) 05/07/2025     BP Readings from Last 1 Encounters:   05/29/25 114/74       Food History  reviewed    Exercise History:  reviewed    Cultural/Spiritual/Personal Preferences:  None identified    Support System:  family/friends    State of Change:  Contemplation    Barriers to Change:  none    Diagnosis    Altered nutrition related laboratory values related to excess carbohydrate intake and/or inappropriate type of carbohydrate intake as evidenced by HgbA1c 6.4 (5-7-2025).    Intervention    RMR (Method:  [place]. Jeor):  1756 kcal  Activity Factor:  1.2    CHAYITO:  2107 kcal    Goals:  1.  45 grams of carbohydrates per meal, 15-20 grams of carbohydrates per snack. Food intake every 3-4 hours.   2.  Participate in daily intentional activity + shorts walks 5-10 minutes after food intake as able.   3.  Protein + fiber with each meal/snack  4. Eliminate intake of beverages containing sugar    Nutrition Education  The following education was provided to the patient:  Discussed CHO Counting.   Discussed meal planning/USDA's My Plate design.  Suggested dietary modifications based on current dietary behaviors and individual food " preferences.  Discussed physical activity guidelines and its associated benefits.  Discussed tips for eating healthy when dining out.  Discussed nutrition-related lab values and dietary and/or lifestyle factors affecting them.  Discussed sugary foods and/or beverages (potential health consequences of excessive sugar intake, ways to reduce sugar intake, healthy beverage alternatives, etc.).  Discussed recommended fiber intake and food sources of such.  Discussed importance of small behavior/habit changes in improving long-term adherence and sustainability.    Patient verbalized understanding of nutrition education and recommendations received.    Handouts Provided  Meal Planning Guide  Eat Fit Shopping List  Fueling Well On-The-Go  Balanced Diabetes Plate    Monitoring/Evaluation    Monitor the following:  Weight  BMI  Caloric intake  Labs:  HgbA1c, lipid panel    Follow Up Plan:  as needed

## 2025-06-18 ENCOUNTER — NUTRITION (OUTPATIENT)
Dept: INTERNAL MEDICINE | Facility: CLINIC | Age: 70
End: 2025-06-18
Payer: OTHER GOVERNMENT

## 2025-06-18 DIAGNOSIS — R73.02 IGT (IMPAIRED GLUCOSE TOLERANCE): ICD-10-CM

## 2025-06-18 PROCEDURE — 97802 MEDICAL NUTRITION INDIV IN: CPT | Mod: S$GLB,,, | Performed by: DIETITIAN, REGISTERED

## 2025-06-26 ENCOUNTER — OFFICE VISIT (OUTPATIENT)
Dept: ORTHOPEDICS | Facility: CLINIC | Age: 70
End: 2025-06-26

## 2025-06-26 VITALS — HEIGHT: 66 IN | WEIGHT: 263.88 LBS | BODY MASS INDEX: 42.41 KG/M2

## 2025-06-26 DIAGNOSIS — M23.92 INTERNAL DERANGEMENT OF LEFT KNEE: Primary | ICD-10-CM

## 2025-06-26 DIAGNOSIS — E66.01 MORBID OBESITY WITH BMI OF 40.0-44.9, ADULT: ICD-10-CM

## 2025-06-26 DIAGNOSIS — Z96.652 HISTORY OF TOTAL LEFT KNEE REPLACEMENT: ICD-10-CM

## 2025-06-26 DIAGNOSIS — M17.11 ARTHRITIS OF KNEE, RIGHT: ICD-10-CM

## 2025-06-26 DIAGNOSIS — M21.061 ACQUIRED VALGUS DEFORMITY KNEE, RIGHT: ICD-10-CM

## 2025-06-26 PROCEDURE — 99999 PR PBB SHADOW E&M-EST. PATIENT-LVL III: CPT | Mod: PBBFAC,,, | Performed by: ORTHOPAEDIC SURGERY

## 2025-06-26 PROCEDURE — 99213 OFFICE O/P EST LOW 20 MIN: CPT | Mod: S$PBB,,, | Performed by: ORTHOPAEDIC SURGERY

## 2025-06-26 PROCEDURE — 99213 OFFICE O/P EST LOW 20 MIN: CPT | Mod: PBBFAC | Performed by: ORTHOPAEDIC SURGERY

## 2025-06-26 NOTE — PROGRESS NOTES
Subjective:     Patient ID: Madhuri Hickman is a 69 y.o. female.    Chief Complaint: Pain of the Left Knee     Mild right knee pain  HPI:  09/21/2020  64-year-old  who injured her knee approximately 16 years ago at work wears a wooden steps broke while going up.  She was eventually return to work with restrictions.  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  She did not hit the ground she held onto the truck.  She stated ibuprofen 800 mg helps but she ran out she really requesting renewal.  She has not received any injections over the last several years.  She has occasional catching locking and giving way.  Pain is 6/10 that is constant worst with stairs if she tries to go up.  Unable to squat or kneel.  Unable to stand or walk too long.  She is not using any assistive devices to ambulate.  Denies any fever or chills or shortness of breath or difficulty with chewing or swallowing loss of bowel bladder control or blurry vision or double vision or numbness or tingling in the legs or hands    11/09/2020  Steroid injection into the left knee as well as ibuprofen seems to have helped given on 09/21.  The story goes that she slipped getting out of her mail truck and slipped and grass.  She does have pre-existing arthritis in her left knee and this fall aggravated it.  She states the physical therapy is helping the injection helped the ibuprofen is helping.  Still afraid L catching locking.  Her pain is 5/10  Denies any fever or chills or shortness of breath or difficulty with chewing or swallowing loss of bowel bladder control.      01/07/2021  Patient arrived 21 min late for her appointment  Arthritis of both knees.  Posttraumatic left knee arthrosis.  She was instructed to take ibuprofen and Tylenol as needed.  Received steroid injection on 09/21/2020 with good relief of her pain  Still going to physical therapy at [facility].  She is doing slightly better now she can get  out of the tub instead of showering.  Walking 100 ft become severely painful has to sit down.  Still doing her dishes sitting down.  She is questioning my diagnosis at this point since her work says do not except arthritis as a condition.  I did tell her her injury worsened what she has her arthritis and considered posttraumatic arthritis of the knee.  This is a medical condition and has a code.  Since she is improving on ibuprofen and physical therapy and Tylenol no need to perform any injection.  Pain is 6/10    Denies any fever or chills or shortness of breath or difficulty with chewing or swallowing loss of bowel bladder control blurry vision double vision    02/26/2021  Left knee severe posttraumatic arthritis and right knee mild to moderate arthritis.  Numerous treatment including physical therapy, steroid injection 09/21/2020, hyaluronic injection 01/07/2021 and different NSAIDs did not seem to make be used difference just mild assistant.  She is using occasionally her cane to get around.  She is at a point she says she wants to proceed with her total knee replacement.  The ibuprofen does not seem to work as well.  She feels sometimes Tylenol work better.  Activities of daily living seems to be difficult even washing her dishes at this point seems to be impossible.    Denies any fever or chills or shortness of breath or difficulty with chewing or swallowing loss of bowel bladder control blurry vision double vision or loss of sense smell or taste    05/20/2021  Left knee severe posttraumatic arthritis.  Patient has denied total knee replacement.  She is taking gabapentin and meloxicam.  At this time she is not working.  Today nurse from [employer] is with her and patient agreed for her to sit and discuss.  Nurse's name Angela Camejo .  We did discuss and showed x-ray.  Discussed treatment plan which included injections of steroid different anti-inflammatories physical therapy, gabapentin..  So far she still  under review.  It eventually if that  gets denied need to go for independent medical examination  No fever no chills no shortness of breath or difficulty with chewing or swallowing loss of bowel bladder control blurry vision double vision loss sense smell or taste      07/26/2021  Left knee posttraumatic arthritis.  Things are getting worst since last time we have seen her.  Now it is locking on her in the middle of the night unable to get any relief.  She is still taking the gabapentin occasionally taking the meloxicam.  Her pain is 8/10.  Unable to stand for any .  More than 10 minutes.  She is having catching locking feeling in the knee despite not doing anything in the middle of the night also.  Unable to do steps.  Walking from the parking lot to the office was very difficult.  Using a cane to get around.  States she was scared that her knee locked on her in the middle of the night and could not do anything for it until slowly unlocked itself by the next day.  These episodes are becoming more frequent than normal.  Patient had been through anti-inflammatories, gabapentin, steroid injections, viscosupplementation injections with very mild and minimal relief.  No fever no chills no shortness of breath or difficulty with chewing or swallowing or loss of bowel bladder control blurry vision double vision loss sense smell or taste    02/14/2022  Left total knee replacement 12/29/2021.  Patient going to [facility] physical therapy in [place] but no true she.  She is taking Tylenol.  She ran out of her gabapentin and was not too sure if she needs to continue with it.  Her right knee with severe arthritis hurts her more than the left.  She is pleased with the results so far on the left knee and ambulating without any assistive devices.  She stated she was supposed to get a bath tub bench because of her tub and she needs assistance to get inside the tub and get out and never received it..  She complains occasional numbness in  the toes and on the outside of her knee.  I refreshed her memory that I did tell her that almost all total knees are numb on the outside of the knees and with time she will forget about it.  And as far as the numbness in the toes I did tell her that this is usually secondary to her back.  Pain is 5/10    03/28/2022  Left TKA 12/29/2021/worker's comp  Patient never received approval to continue to go to physical therapy at [facility] [place] or the [facility].  Patient stated the request was not sent to the worker's Comp person at [facility] to get the approval of the above.  Pain is 6/10.  She is able to ambulate without any assistive devices.  She said she is happy she had her surgery done.  She does not think she can return to work at this time.  She had lost time from not being able to go to physical therapy.  She does ambulate without any assistive devices.    Right knee she is having pain in that but that is a different worker's comp number and we will not evaluate today    06/17/2022    Patient has left TKA 12/29/2021 worker's comp    Has not done to physical therapy had not received bath tub bench.  She is doing her own independent exercises.  She still a little weak she does ambulate without any assistive devices.  She is taking gabapentin 300 twice a day and meloxicam on a daily basis.  Something with worker's comp approval and not approving things.  I cannot and is point know what is the problem.  When patients have undergo major surgery the required physical therapy and it is part of the deal.  She is happy that she had her total knee replacement not having any pain 0/10 but still having some weakness in the leg.    09/22/2022 /checked in 10 minutes late for her appointment  Left TKA 12/29/2021/worker's comp.  Right knee arthritis is a different worker's comp claim  Patient stated the worker's comp physical therapy referral was placed on the right knee which is not the 1 we operated on.  Now finally she starting  physical therapy over the last 2 weeks at the [facility] in [facility].  She starting with the water therapy.  She is walking without any assistive devices.  She still taking the gabapentin.  Her pain is 5/10.  Now she is having a little bit more pain in the right knee compared to the left.  No fever no chills no shortness of breath difficulty with chewing swallowing loss of bowel bladder control blurry vision double vision loss sense smell or taste     She has not return to work return to work    12/12/2022    Left TKA 12/29/2021 which is a worker's comp injury different claim than the right side   Patient finally is going to [facility]  She still taking gabapentin and meloxicam.  She is ambulating without any assistive devices today.  She said she is very happy with the results on the left knee her pain is 0/10 because she is taking the medication   No fever no chills no shortness of breath or difficulty with chewing swallowing or loss of bowel bladder control    03/16/2023   Left TKA 12/29/2021 worker's comp injury.  She has finish her therapy at [facility] .  She is doing independent exercise and going to the gym.  She still taking meloxicam and gabapentin on a daily basis.  Her pain is 4/10.  She ambulates without any assistive devices   I did tell her she needs to go for functional capacity evaluation at this time to see what she can do in order to arrange for return to work     She has been taking these medications the meloxicam and gabapentin for a while now.  We need to make sure her liver and her kidney functions are still okay.  I will order a complete metabolic panel  She has an appointment to see her primary care physician coming up soon to assess other things that she has.    05/06/2024  Left TKA 12/29/2021 eventually she had a functional capacity evaluation that showed that she is markedly deconditioned.  Only could do sedentary type of work.  I went over it with the patient.   She is still feeling weakness in the legs and went over the medications.  No fever no chills no shortness breath or pain is still at 4/10.  I did discuss that total knee replacement is not perfect it is 80% successful in decreasing pain and increasing function.  She stated she is definitely better than before having surgery You     09/09/2024   Left TKA 12/29/2021, [facility] shown that she is markedly deconditioned and she needs extensive physical therapy.  They working with the night now in the pool and doing aquatic to allow her to be able to get more strengthen less loading on the joints.  She is not there yet.  And I think she can not do 8 hours of sedentary type of work.  She is taking meloxicam and now she is taking gabapentin which should help with nerve pain.  She recently acquired shingles to the right upper extremity and I did tell her that she should ask primary care about the vaccine.  The vaccine helps real preventing reoccurrence.  She is still trying to lose weight but BMI is 41.31  I did tell the patient that total knee replacement is not perfect it is 80% helpful however there is 20% of the time where things do not work out well and still complain of certain problems.  There is arthritis in the right knee    01/09/2025   Left TKA 12/29/2021   Patient states her pain is 8-9/10.  She said when she is doing the aquatic therapy it helps significantly however they give you only 6 visits and they stop the tell her she ran out of approval.  This patient needs to continue constant aquatic therapy.  We will ask worker's comp if they will pay monthly do's or membership at the therapist so she can use the equipment and do the exercises required.  She states standing in line light say at the bank to do some activity she will start with pain in the left knee.  She is unable to stay out of her gabapentin in the meloxicam she finds big difference if she does not take it.  Her BMI had not changed much it she is still at  41.53  No fever no chills no shortness of breath or difficulty with chewing swallowing   I did tell her that I would like to obtain another x-ray last x-ray was in May of 2024 we will obtain it next visit    05/12/2025   Left TKA 12/29/2021 patient stated that she had to cleaned the house several days ago it started with severe anterior knee pain.  She does not recall squatting or kneeling.  She said started on Thursday and by Sunday things subsided.  Now she drove over and she stated sitting in the cough or 20 minutes getting up from the car it became so tight could not straighten it out as easy.  She did not fall no trauma.  No fever no chills no shortness of breath or difficulty with chewing swallowing loss bowel bladder control   She is ambulating without any assistive devices  Pain 8/10      06/26/2025   Left TKA 12/29/2021   Last visit she had severe pain in the knee joint and we discussed could be scar tissue formation that got in pinched between the plastic and the metal.  It all subsided after we started her on meloxicam.  She said her pain is tolerable now at 2/10 and she is very happy it all eased up.  We went over her labs that is ordered by primary care in the electronic record from last month and the CMP was normal/creatinine, BUN, liver function tests are good so she can continue taking the meloxicam as needed.  We did add Tylenol Arthritis for that.  Her hemoglobin A1c was review that is 6.4 so she is in good control at this time.  No fever no chills no shortness breath or difficulty with chewing swallowing loss of bowel bladder control   I brought in the model and showed her a total knee after awhile we do form scar tissue in the knee joint and sometimes the soft tissue get caught between the plastic and the metal and causes severe pain depending how we turned.    She doing well at this point  Past Medical History:   Diagnosis Date    Anxiety     Arthritis     left knee    Encounter for general  adult medical examination without abnormal findings 05/09/2017    Hypertension     Vitamin D deficiency      Past Surgical History:   Procedure Laterality Date    HYSTERECTOMY      total    TOTAL KNEE ARTHROPLASTY Left 12/29/2021    Procedure: ARTHROPLASTY, KNEE, TOTAL;  Surgeon: Alf Santana MD;  Location: [facility];  Service: Orthopedics;  Laterality: Left;    VAGINAL DELIVERY       Family History   Problem Relation Name Age of Onset    Diabetes Mother      Hypertension Mother      Heart disease Father      Hypertension Sister      Heart disease Brother      Diabetes Brother      Cancer Maternal Grandmother          breast     Social History     Socioeconomic History    Marital status:    Tobacco Use    Smoking status: Never    Smokeless tobacco: Never   Substance and Sexual Activity    Alcohol use: Yes     Comment: social; hold 72hrs prior to surgery    Drug use: No    Sexual activity: Not Currently     Medication List with Changes/Refills   Current Medications    AMLODIPINE-BENAZEPRIL 10-20MG (LOTREL) 10-20 MG PER CAPSULE    Take 1 capsule by mouth once daily.    CHLORTHALIDONE (HYGROTEN) 25 MG TAB    Take 25 mg by mouth.    CYANOCOBALAMIN (VITAMIN B-12) 1000 MCG TABLET    Take 100 mcg by mouth once daily.    ERGOCALCIFEROL (ERGOCALCIFEROL) 50,000 UNIT CAP    Take 1 capsule (50,000 Units total) by mouth every 7 days.    HYDROXYZINE HCL (ATARAX) 10 MG TAB    Take 1 tablet (10 mg total) by mouth nightly as needed (as needed (itching/insomnia).).    MELOXICAM (MOBIC) 15 MG TABLET    Take 1 tablet (15 mg total) by mouth once daily. Take with food    MUPIROCIN (BACTROBAN) 2 % OINTMENT    Apply topically 3 (three) times daily.    SITAGLIPTAN-METFORMIN (JANUMET XR) 100-1,000 MG TM24    Take 1 tablet by mouth Daily.    TRIAMCINOLONE ACETONIDE 0.1% (KENALOG) 0.1 % OINTMENT    Apply topically 2 (two) times daily.     Review of patient's allergies indicates:  No Known Allergies  Review of Systems    Constitutional: Negative for decreased appetite.   HENT:  Negative for tinnitus.    Eyes:  Negative for double vision.   Cardiovascular:  Negative for chest pain.   Respiratory:  Negative for wheezing.    Hematologic/Lymphatic: Negative for bleeding problem.   Skin:  Negative for dry skin.   Musculoskeletal:  Positive for arthritis, joint pain and stiffness. Negative for back pain, falls, gout, joint swelling and neck pain.   Gastrointestinal:  Negative for abdominal pain.   Genitourinary:  Negative for bladder incontinence.   Neurological:  Negative for numbness, paresthesias and sensory change.   Psychiatric/Behavioral:  Negative for altered mental status.        Objective:   Body mass index is 42.59 kg/m².  There were no vitals filed for this visit.       General    Constitutional: She is oriented to person, place, and time. She appears well-developed.   HENT:   Head: Atraumatic.   Eyes: EOM are normal.   Cardiovascular:  Normal rate.            Pulmonary/Chest: Effort normal.   Neurological: She is alert and oriented to person, place, and time.   Psychiatric: Judgment normal.              Ambulating without any assistive devices/there is a waddling gait  Pelvis is level  Bilateral hips passive motion without pain or limitations.  There is no pain to palpation over the trochanters.  Hip flexors, abductors, adductors, quads, hamstrings, ankle extensors and flexors inverters and everters all 5/5  Left knee surgical incision healed well there is no drainage.  There is no warmness to touch.  Active motion 0-130 degrees.  Stable in extension and flexion.  Slight decrease in sensation in the lateral aspect of the knee joint.  There is no defect in the patella or quadriceps tendon her quads and hamstrings are still weak at 5-/5.  Feels tightness in the anterior joint.  Slowly flexing to around 95°.  No joint line tenderness.  Most of the tenderness is anterior with very mild swelling.  That eased up since last visit     Able to move her ankle up and down  Right knee mild medial joint pain/moderate lateral joint pain.  No swelling.  Motion 0-120 degrees.  Collaterals and cruciates are stable.  Negative Bienvenido sign.  No defect in the patella or quadriceps tendon  Bilateral calves are soft nontender  Slight swelling around the ankle  DP 1+  PT 1+  Skin is warm to touch no obvious lesions.  Sensory intact to touch    Relevant imaging results reviewed and interpreted by me, discussed with the patient and / or family today     X-ray 05/12/2025 left total knee [facility] in excellent alignment.  Patella midline not tilting.  No fracture seen.  The right knee has some degenerative changes not different than before  X-ray 05/06/2024 left TKA still in excellent alignment patella midline no evidence of failure.  [facility] posterior stabilized knee system.  Right knee with moderate arthritis in the lateral joint and moderate lateral joint narrowing and small marginal osteophyte  X-ray 03/16/2023 left TKA in excellent alignment.  No evidence of failure.  Right knee with moderately severe arthritis on the lateral joint with marginal osteophytic change  X-ray 02/14/2022 left TKA in excellent alignment.  No evidence of fracture.  Patella midline.  Right knee with complete loss lateral joint space without fracture on AP with flexion  X-ray 12/29/2021 left TKA without evidence of fracture.  Do not have true AP and lateral views.  Staples intact  Personally reviewed the x-rays of both knees with the patient agree with the above  X-ray 09/03/2020 bilateral knees with left knee severe loss of medial joint space and osteophytic changes anteriorly and as well as laterally.  Also the right knee has some arthritic changes with not as bad loss of joint space as the left  Assessment:     Encounter Diagnoses   Name Primary?    Internal derangement of left knee Yes    History of total left knee replacement     Arthritis of knee, right      Acquired valgus deformity knee, right     Morbid obesity with BMI of 40.0-44.9, adult         Plan:   Internal derangement of left knee    History of total left knee replacement    Arthritis of knee, right    Acquired valgus deformity knee, right    Morbid obesity with BMI of 40.0-44.9, adult         Patient Instructions   You stated the pain in the knee has subsided and back to the way it was before  You did probably form some scar tissue that caught between the plastic and metal and now it came out loose   You doing better  Continue with the your independent walking   I think at this point you do not need official therapy  I do not think you can return to work anymore  You are taking chronically meloxicam every day   I did check blood work performed by her primary care last month and CMP was completely with a normal limits/liver function test creatinine and BUN.  Your hemoglobin A1c is 6.4 which is excellent should be less than 8  Continue exercising on your own and walking   Take meloxicam on as needed basis  You can still take Tylenol arthritis 650 mg couple times a day if needed   I will see you back in 6 months      Disclaimer: This note was prepared using a voice recognition system and is likely to have sound alike errors within the text.

## 2025-06-26 NOTE — PATIENT INSTRUCTIONS
You stated the pain in the knee has subsided and back to the way it was before  You did probably form some scar tissue that caught between the plastic and metal and now it came out loose   You doing better  Continue with the your independent walking   I think at this point you do not need official therapy  I do not think you can return to work anymore  You are taking chronically meloxicam every day   I did check blood work performed by her primary care last month and CMP was completely with a normal limits/liver function test creatinine and BUN.  Your hemoglobin A1c is 6.4 which is excellent should be less than 8  Continue exercising on your own and walking   Take meloxicam on as needed basis  You can still take Tylenol arthritis 650 mg couple times a day if needed   I will see you back in 6 months

## (undated) DEVICE — BANDAGE ELAS SOFTWRAP ST 6X5YD

## (undated) DEVICE — GOWN SURG 2XL DISP TIE BACK

## (undated) DEVICE — DRESSING AQUACEL AG 3.5X10IN

## (undated) DEVICE — MANIFOLD 4 PORT

## (undated) DEVICE — BLADE SAG 18.0X1.27X100

## (undated) DEVICE — BANDAGE MATRIX HK LOOP 6IN 5YD

## (undated) DEVICE — SEE MEDLINE ITEM 157117

## (undated) DEVICE — PAD ABD 8X10 STERILE

## (undated) DEVICE — DRAPE PLASTIC U 60X72

## (undated) DEVICE — DRAPE STERI INSTRUMENT 1018

## (undated) DEVICE — SEE MEDLINE ITEM 157125

## (undated) DEVICE — APPLICATOR CHLORAPREP ORN 26ML

## (undated) DEVICE — CONTAINER SPECIMEN OR STER 4OZ

## (undated) DEVICE — SUT VICRYL 1 OB 36 CTX

## (undated) DEVICE — COVER OVERHEAD SURG LT BLUE

## (undated) DEVICE — TIP SUCTION YANKAUER

## (undated) DEVICE — GLOVE SURGICAL LATEX SZ 8

## (undated) DEVICE — SEE MEDLINE ITEM 146298

## (undated) DEVICE — MIXER BONE CEMENT

## (undated) DEVICE — SOL IRR NACL .9% 3000ML

## (undated) DEVICE — SPONGE LAP 18X18 PREWASHED

## (undated) DEVICE — SEE MEDLINE ITEM 157216

## (undated) DEVICE — SEE MEDLINE ITEM 157131

## (undated) DEVICE — BLADE SURG CARBON STEEL #10

## (undated) DEVICE — SEE MEDLINE ITEM 157166

## (undated) DEVICE — BNDG COFLEX FOAM LF2 ST 4X5YD

## (undated) DEVICE — GAUZE SPONGE 4X4 12PLY

## (undated) DEVICE — SYR 30CC LUER LOCK

## (undated) DEVICE — STAPLER SKIN PROXIMATE WIDE

## (undated) DEVICE — INTERPULSE SET

## (undated) DEVICE — ALCOHOL 70% ISOP RUBBING 4OZ

## (undated) DEVICE — ELECTRODE REM PLYHSV RETURN 9

## (undated) DEVICE — TUBING SUCTION STRAIGHT .25X20

## (undated) DEVICE — DRAPE INCISE IOBAN 2 23X33IN

## (undated) DEVICE — SUPPORT ULNA NERVE PROTECTOR

## (undated) DEVICE — SEE MEDLINE ITEM 157027

## (undated) DEVICE — SEE MEDLINE ITEM 146231

## (undated) DEVICE — GLOVE BIOGEL PI ORTHO PRO SZ 8

## (undated) DEVICE — TOWEL OR DISP STRL BLUE 4/PK

## (undated) DEVICE — HOOD FLYTE PEELWY STERISHIELD